# Patient Record
Sex: FEMALE | Race: WHITE | Employment: OTHER | URBAN - METROPOLITAN AREA
[De-identification: names, ages, dates, MRNs, and addresses within clinical notes are randomized per-mention and may not be internally consistent; named-entity substitution may affect disease eponyms.]

---

## 2018-01-01 ENCOUNTER — HOSPITAL ENCOUNTER (OUTPATIENT)
Age: 78
Setting detail: OUTPATIENT SURGERY
Discharge: HOME | End: 2018-10-17
Attending: INTERNAL MEDICINE | Admitting: INTERNAL MEDICINE
Payer: MEDICARE

## 2018-01-01 ENCOUNTER — OFFICE VISIT (OUTPATIENT)
Dept: HEMATOLOGY | Age: 78
End: 2018-01-01

## 2018-01-01 ENCOUNTER — APPOINTMENT (OUTPATIENT)
Dept: ULTRASOUND IMAGING | Age: 78
End: 2018-01-01
Attending: INTERNAL MEDICINE
Payer: MEDICARE

## 2018-01-01 ENCOUNTER — HOSPITAL ENCOUNTER (OUTPATIENT)
Dept: ULTRASOUND IMAGING | Age: 78
Discharge: HOME | End: 2018-10-18
Attending: INTERNAL MEDICINE
Payer: MEDICARE

## 2018-01-01 ENCOUNTER — HOSPITAL ENCOUNTER (OUTPATIENT)
Age: 78
Setting detail: OUTPATIENT SURGERY
Discharge: HOME | End: 2018-11-29
Attending: INTERNAL MEDICINE | Admitting: INTERNAL MEDICINE
Payer: MEDICARE

## 2018-01-01 ENCOUNTER — HOSPITAL ENCOUNTER (OUTPATIENT)
Dept: NON INVASIVE DIAGNOSTICS | Age: 78
Discharge: HOME | End: 2018-10-18
Attending: INTERNAL MEDICINE
Payer: MEDICARE

## 2018-01-01 VITALS
WEIGHT: 153 LBS | HEIGHT: 66 IN | BODY MASS INDEX: 24.59 KG/M2 | SYSTOLIC BLOOD PRESSURE: 130 MMHG | DIASTOLIC BLOOD PRESSURE: 65 MMHG

## 2018-01-01 VITALS
HEIGHT: 66 IN | HEART RATE: 70 BPM | DIASTOLIC BLOOD PRESSURE: 68 MMHG | OXYGEN SATURATION: 98 % | SYSTOLIC BLOOD PRESSURE: 144 MMHG | TEMPERATURE: 97 F | BODY MASS INDEX: 24.59 KG/M2 | WEIGHT: 153 LBS

## 2018-01-01 VITALS
HEART RATE: 71 BPM | TEMPERATURE: 97.2 F | DIASTOLIC BLOOD PRESSURE: 51 MMHG | SYSTOLIC BLOOD PRESSURE: 115 MMHG | BODY MASS INDEX: 24.59 KG/M2 | WEIGHT: 153 LBS | OXYGEN SATURATION: 97 % | HEIGHT: 66 IN

## 2018-01-01 VITALS
SYSTOLIC BLOOD PRESSURE: 124 MMHG | HEIGHT: 66 IN | TEMPERATURE: 97.6 F | WEIGHT: 153 LBS | RESPIRATION RATE: 16 BRPM | BODY MASS INDEX: 24.59 KG/M2 | DIASTOLIC BLOOD PRESSURE: 63 MMHG | OXYGEN SATURATION: 100 % | HEART RATE: 72 BPM

## 2018-01-01 VITALS
WEIGHT: 160.8 LBS | BODY MASS INDEX: 26.79 KG/M2 | HEART RATE: 79 BPM | HEIGHT: 65 IN | DIASTOLIC BLOOD PRESSURE: 85 MMHG | RESPIRATION RATE: 20 BRPM | TEMPERATURE: 98.7 F | OXYGEN SATURATION: 98 % | SYSTOLIC BLOOD PRESSURE: 152 MMHG

## 2018-01-01 DIAGNOSIS — R18.8 OTHER ASCITES: ICD-10-CM

## 2018-01-01 DIAGNOSIS — I25.10 CAD (CORONARY ARTERY DISEASE): ICD-10-CM

## 2018-01-01 DIAGNOSIS — R18.8 OTHER ASCITES: Primary | ICD-10-CM

## 2018-01-01 DIAGNOSIS — I35.0 AORTIC STENOSIS, SEVERE: ICD-10-CM

## 2018-01-01 DIAGNOSIS — Z11.59 ENCOUNTER FOR SCREENING FOR OTHER VIRAL DISEASES: ICD-10-CM

## 2018-01-01 DIAGNOSIS — I25.709 CORONARY ARTERY DISEASE INVOLVING CORONARY BYPASS GRAFT OF NATIVE HEART WITH ANGINA PECTORIS (HCC): Primary | ICD-10-CM

## 2018-01-01 DIAGNOSIS — R18.8 ASCITES: ICD-10-CM

## 2018-01-01 DIAGNOSIS — I25.709 CORONARY ARTERY DISEASE INVOLVING CORONARY BYPASS GRAFT OF NATIVE HEART WITH ANGINA PECTORIS (HCC): ICD-10-CM

## 2018-01-01 DIAGNOSIS — E87.79 OTHER FLUID OVERLOAD: ICD-10-CM

## 2018-01-01 LAB
ANION GAP SERPL CALC-SCNC: 7 MMOL/L (ref 3–18)
ATRIAL RATE: 69 BPM
BUN SERPL-MCNC: 35 MG/DL (ref 7–18)
BUN/CREAT SERPL: 30
CALCIUM SERPL-MCNC: 7.4 MG/DL (ref 8.5–10.1)
CALCULATED P AXIS, ECG09: 58 DEGREES
CALCULATED R AXIS, ECG10: 51 DEGREES
CALCULATED T AXIS, ECG11: 94 DEGREES
CHLORIDE SERPL-SCNC: 105 MMOL/L (ref 100–108)
CHOLEST SERPL-MCNC: 116 MG/DL
CO2 SERPL-SCNC: 27 MMOL/L (ref 21–32)
CRD SYSTOLIC BP: 149
CREAT SERPL-MCNC: 1.15 MG/DL (ref 0.6–1.3)
DIAGNOSIS, 93000: NORMAL
ECHO AO ARCH DIAM: 2.85 CM
ECHO AO ROOT DIAM: 3.13 CM
ECHO AV AREA PEAK VELOCITY: 1.1 CM2
ECHO AV AREA VTI: 1 CM2
ECHO AV AREA/BSA PEAK VELOCITY: 0.6 CM2/M2
ECHO AV AREA/BSA VTI: 0.5 CM2/M2
ECHO AV MEAN GRADIENT: 38.9 MMHG
ECHO AV PEAK GRADIENT: 65.7 MMHG
ECHO AV PEAK VELOCITY: 405.18 CM/S
ECHO AV REGURGITANT PHT: 319.9 CM
ECHO AV VTI: 103.94 CM
ECHO IVC PROX: 1.84 CM
ECHO LA MAJOR AXIS: 5.3 CM
ECHO LA VOL 2C: 128.6 ML (ref 22–52)
ECHO LA VOL 4C: 104.66 ML (ref 22–52)
ECHO LA VOL BP: 127.11 ML (ref 22–52)
ECHO LA VOL/BSA BIPLANE: 71.21 ML/M2 (ref 16–28)
ECHO LA VOLUME INDEX A2C: 72.05 ML/M2 (ref 16–28)
ECHO LA VOLUME INDEX A4C: 58.64 ML/M2 (ref 16–28)
ECHO LV E' LATERAL VELOCITY: 0.11 CM/S
ECHO LV E' SEPTAL VELOCITY: 0.08 CM/S
ECHO LV EDV A2C: 145.8 ML
ECHO LV EDV A4C: 158.5 ML
ECHO LV EDV BP: 152.5 ML (ref 56–104)
ECHO LV EDV INDEX A4C: 88.8 ML/M2
ECHO LV EDV INDEX BP: 85.4 ML/M2
ECHO LV EDV NDEX A2C: 81.7 ML/M2
ECHO LV EJECTION FRACTION A2C: 66 %
ECHO LV EJECTION FRACTION A4C: 63 %
ECHO LV EJECTION FRACTION BIPLANE: 64.3 % (ref 55–100)
ECHO LV ESV A2C: 50.3 ML
ECHO LV ESV A4C: 58.1 ML
ECHO LV ESV BP: 54.4 ML (ref 19–49)
ECHO LV ESV INDEX A2C: 28.2 ML/M2
ECHO LV ESV INDEX A4C: 32.6 ML/M2
ECHO LV ESV INDEX BP: 30.5 ML/M2
ECHO LV INTERNAL DIMENSION DIASTOLIC: 5.03 CM (ref 3.9–5.3)
ECHO LV INTERNAL DIMENSION SYSTOLIC: 3.42 CM
ECHO LV IVSD: 0.95 CM (ref 0.6–0.9)
ECHO LV MASS 2D: 206.1 G (ref 67–162)
ECHO LV MASS INDEX 2D: 115.5 G/M2 (ref 43–95)
ECHO LV POSTERIOR WALL DIASTOLIC: 0.99 CM (ref 0.6–0.9)
ECHO LVOT DIAM: 1.87 CM
ECHO LVOT PEAK GRADIENT: 9.9 MMHG
ECHO LVOT PEAK VELOCITY: 157.35 CM/S
ECHO LVOT VTI: 36.23 CM
ECHO MV A VELOCITY: 128.98 CM/S
ECHO MV AREA PHT: 2.7 CM2
ECHO MV E DECELERATION TIME (DT): 285.2 MS
ECHO MV E VELOCITY: 1.26 CM/S
ECHO MV E/A RATIO: 0.01
ECHO MV E/E' LATERAL: 11.45
ECHO MV E/E' RATIO (AVERAGED): 13.6
ECHO MV E/E' SEPTAL: 15.75
ECHO MV PRESSURE HALF TIME (PHT): 82.7 MS
ECHO PULMONARY ARTERY SYSTOLIC PRESSURE (PASP): 39 MMHG
ECHO PV MAX VELOCITY: 124.99 CM/S
ECHO PV PEAK GRADIENT: 6.2 MMHG
ECHO RA AREA 4C: 14.5 CM2
ECHO RV INTERNAL DIMENSION: 3.77 CM
ECHO RV TAPSE: 1.9 CM (ref 1.5–2)
ECHO RVOT PEAK VELOCITY: 90.49 CM/S
ECHO TV REGURGITANT MAX VELOCITY: 300.1 CM/S
ECHO TV REGURGITANT PEAK GRADIENT: 36 MMHG
END DIASTOLIC PRESSURE: 23
ERYTHROCYTE [DISTWIDTH] IN BLOOD BY AUTOMATED COUNT: 25.4 % (ref 11.6–14.5)
GLUCOSE BLD STRIP.AUTO-MCNC: 83 MG/DL (ref 70–110)
GLUCOSE SERPL-MCNC: 74 MG/DL (ref 74–99)
HCT VFR BLD AUTO: 30.7 % (ref 35–45)
HDLC SERPL-MCNC: 35 MG/DL (ref 40–60)
HDLC SERPL: 3.3 {RATIO} (ref 0–5)
HGB BLD-MCNC: 9.5 G/DL (ref 12–16)
INR PPP: 1.1 (ref 0.8–1.2)
LDLC SERPL CALC-MCNC: 67.2 MG/DL (ref 0–100)
LIPID PROFILE,FLP: ABNORMAL
MAGNESIUM SERPL-MCNC: 2.4 MG/DL (ref 1.6–2.6)
MCH RBC QN AUTO: 26.9 PG (ref 24–34)
MCHC RBC AUTO-ENTMCNC: 30.9 G/DL (ref 31–37)
MCV RBC AUTO: 87 FL (ref 74–97)
P-R INTERVAL, ECG05: 144 MS
PISA AR MAX VEL: 260.38 CM/S
PLATELET # BLD AUTO: 262 K/UL (ref 135–420)
POTASSIUM SERPL-SCNC: 4.9 MMOL/L (ref 3.5–5.5)
PROTHROMBIN TIME: 14 SEC (ref 11.5–15.2)
Q-T INTERVAL, ECG07: 424 MS
QRS DURATION, ECG06: 88 MS
QTC CALCULATION (BEZET), ECG08: 454 MS
RBC # BLD AUTO: 3.53 M/UL (ref 4.2–5.3)
SODIUM SERPL-SCNC: 139 MMOL/L (ref 136–145)
TRIGL SERPL-MCNC: 69 MG/DL (ref ?–150)
VENTRICULAR RATE, ECG03: 69 BPM
VLDLC SERPL CALC-MCNC: 13.8 MG/DL
WBC # BLD AUTO: 15.4 K/UL (ref 4.6–13.2)

## 2018-01-01 PROCEDURE — 99152 MOD SED SAME PHYS/QHP 5/>YRS: CPT | Performed by: INTERNAL MEDICINE

## 2018-01-01 PROCEDURE — C1894 INTRO/SHEATH, NON-LASER: HCPCS | Performed by: INTERNAL MEDICINE

## 2018-01-01 PROCEDURE — C1769 GUIDE WIRE: HCPCS | Performed by: INTERNAL MEDICINE

## 2018-01-01 PROCEDURE — 77030014137 HC TY PARCNT TELE -B: Performed by: INTERNAL MEDICINE

## 2018-01-01 PROCEDURE — 74011250636 HC RX REV CODE- 250/636

## 2018-01-01 PROCEDURE — 83735 ASSAY OF MAGNESIUM: CPT

## 2018-01-01 PROCEDURE — 77030008543 HC TBNG MON PRSS MRTM -A: Performed by: INTERNAL MEDICINE

## 2018-01-01 PROCEDURE — 74011636320 HC RX REV CODE- 636/320: Performed by: INTERNAL MEDICINE

## 2018-01-01 PROCEDURE — 93461 R&L HRT ART/VENTRICLE ANGIO: CPT | Performed by: INTERNAL MEDICINE

## 2018-01-01 PROCEDURE — 93976 VASCULAR STUDY: CPT

## 2018-01-01 PROCEDURE — 93005 ELECTROCARDIOGRAM TRACING: CPT

## 2018-01-01 PROCEDURE — 77030018836 HC SOL IRR NACL ICUM -A: Performed by: INTERNAL MEDICINE

## 2018-01-01 PROCEDURE — C1751 CATH, INF, PER/CENT/MIDLINE: HCPCS | Performed by: INTERNAL MEDICINE

## 2018-01-01 PROCEDURE — 74011250636 HC RX REV CODE- 250/636: Performed by: INTERNAL MEDICINE

## 2018-01-01 PROCEDURE — 77030004522 HC CATH ANGI DX EXPO BSC -A: Performed by: INTERNAL MEDICINE

## 2018-01-01 PROCEDURE — C1760 CLOSURE DEV, VASC: HCPCS | Performed by: INTERNAL MEDICINE

## 2018-01-01 PROCEDURE — 76705 ECHO EXAM OF ABDOMEN: CPT

## 2018-01-01 PROCEDURE — 93306 TTE W/DOPPLER COMPLETE: CPT

## 2018-01-01 PROCEDURE — 76040000019: Performed by: INTERNAL MEDICINE

## 2018-01-01 PROCEDURE — 99153 MOD SED SAME PHYS/QHP EA: CPT | Performed by: INTERNAL MEDICINE

## 2018-01-01 PROCEDURE — 82962 GLUCOSE BLOOD TEST: CPT

## 2018-01-01 PROCEDURE — 85610 PROTHROMBIN TIME: CPT

## 2018-01-01 PROCEDURE — 80048 BASIC METABOLIC PNL TOTAL CA: CPT

## 2018-01-01 PROCEDURE — 77030013797 HC KT TRNSDUC PRSSR EDWD -A: Performed by: INTERNAL MEDICINE

## 2018-01-01 PROCEDURE — 77030004521 HC CATH ANGI DX COOK -B: Performed by: INTERNAL MEDICINE

## 2018-01-01 PROCEDURE — 85027 COMPLETE CBC AUTOMATED: CPT

## 2018-01-01 PROCEDURE — C1887 CATHETER, GUIDING: HCPCS | Performed by: INTERNAL MEDICINE

## 2018-01-01 PROCEDURE — 80061 LIPID PANEL: CPT

## 2018-01-01 PROCEDURE — 74011250637 HC RX REV CODE- 250/637: Performed by: INTERNAL MEDICINE

## 2018-01-01 RX ORDER — SODIUM CHLORIDE 9 MG/ML
INJECTION, SOLUTION INTRAVENOUS
Status: COMPLETED | OUTPATIENT
Start: 2018-01-01 | End: 2018-01-01

## 2018-01-01 RX ORDER — SODIUM CHLORIDE 0.9 % (FLUSH) 0.9 %
5-10 SYRINGE (ML) INJECTION EVERY 8 HOURS
Status: DISCONTINUED | OUTPATIENT
Start: 2018-01-01 | End: 2018-01-01 | Stop reason: HOSPADM

## 2018-01-01 RX ORDER — SODIUM CHLORIDE 0.9 % (FLUSH) 0.9 %
5-10 SYRINGE (ML) INJECTION AS NEEDED
Status: CANCELLED | OUTPATIENT
Start: 2018-01-01

## 2018-01-01 RX ORDER — SODIUM CHLORIDE 9 MG/ML
25 INJECTION, SOLUTION INTRAVENOUS CONTINUOUS
Status: DISCONTINUED | OUTPATIENT
Start: 2018-01-01 | End: 2018-01-01 | Stop reason: HOSPADM

## 2018-01-01 RX ORDER — DIPHENOXYLATE HYDROCHLORIDE AND ATROPINE SULFATE 2.5; .025 MG/1; MG/1
TABLET ORAL
COMMUNITY
End: 2018-01-01

## 2018-01-01 RX ORDER — SODIUM CHLORIDE 0.9 % (FLUSH) 0.9 %
5-10 SYRINGE (ML) INJECTION AS NEEDED
Status: DISCONTINUED | OUTPATIENT
Start: 2018-01-01 | End: 2018-01-01 | Stop reason: HOSPADM

## 2018-01-01 RX ORDER — LIDOCAINE HYDROCHLORIDE 10 MG/ML
INJECTION INFILTRATION; PERINEURAL AS NEEDED
Status: DISCONTINUED | OUTPATIENT
Start: 2018-01-01 | End: 2018-01-01 | Stop reason: HOSPADM

## 2018-01-01 RX ORDER — SODIUM CHLORIDE 0.9 % (FLUSH) 0.9 %
5-10 SYRINGE (ML) INJECTION EVERY 8 HOURS
Status: CANCELLED | OUTPATIENT
Start: 2018-01-01

## 2018-01-01 RX ORDER — HEPARIN SODIUM 200 [USP'U]/100ML
INJECTION, SOLUTION INTRAVENOUS
Status: COMPLETED | OUTPATIENT
Start: 2018-01-01 | End: 2018-01-01

## 2018-01-01 RX ORDER — SPIRONOLACTONE 100 MG/1
TABLET, FILM COATED ORAL DAILY
COMMUNITY
End: 2019-01-01

## 2018-01-01 RX ORDER — HYDROXYUREA 500 MG/1
500 CAPSULE ORAL DAILY
COMMUNITY
End: 2019-01-01

## 2018-01-01 RX ORDER — GUAIFENESIN 100 MG/5ML
81 LIQUID (ML) ORAL ONCE
Status: COMPLETED | OUTPATIENT
Start: 2018-01-01 | End: 2018-01-01

## 2018-01-01 RX ORDER — FENTANYL CITRATE 50 UG/ML
INJECTION, SOLUTION INTRAMUSCULAR; INTRAVENOUS AS NEEDED
Status: DISCONTINUED | OUTPATIENT
Start: 2018-01-01 | End: 2018-01-01 | Stop reason: HOSPADM

## 2018-01-01 RX ORDER — ALBUMIN HUMAN 250 G/1000ML
25 SOLUTION INTRAVENOUS
Status: CANCELLED | OUTPATIENT
Start: 2018-01-01

## 2018-01-01 RX ORDER — HEPARIN SODIUM 1000 [USP'U]/ML
INJECTION, SOLUTION INTRAVENOUS; SUBCUTANEOUS AS NEEDED
Status: DISCONTINUED | OUTPATIENT
Start: 2018-01-01 | End: 2018-01-01 | Stop reason: HOSPADM

## 2018-01-01 RX ORDER — MIDAZOLAM HYDROCHLORIDE 1 MG/ML
INJECTION, SOLUTION INTRAMUSCULAR; INTRAVENOUS AS NEEDED
Status: DISCONTINUED | OUTPATIENT
Start: 2018-01-01 | End: 2018-01-01 | Stop reason: HOSPADM

## 2018-01-01 RX ORDER — LORAZEPAM 1 MG/1
.5-1 TABLET ORAL ONCE
Status: COMPLETED | OUTPATIENT
Start: 2018-01-01 | End: 2018-01-01

## 2018-01-01 RX ADMIN — ASPIRIN 81 MG 81 MG: 81 TABLET ORAL at 08:04

## 2018-01-01 RX ADMIN — SODIUM CHLORIDE 25 ML/HR: 900 INJECTION, SOLUTION INTRAVENOUS at 07:56

## 2018-01-01 RX ADMIN — LORAZEPAM 1 MG: 1 TABLET ORAL at 08:04

## 2018-03-30 ENCOUNTER — HOSPITAL ENCOUNTER (OUTPATIENT)
Dept: LAB | Age: 78
Discharge: HOME | End: 2018-03-30

## 2018-04-03 ENCOUNTER — HOSPITAL ENCOUNTER (OUTPATIENT)
Dept: LAB | Age: 78
Discharge: HOME | End: 2018-04-03
Payer: MEDICARE

## 2018-04-03 LAB
ALBUMIN SERPL-MCNC: 3 G/DL (ref 3.4–5)
ALBUMIN/GLOB SERPL: 0.8 {RATIO} (ref 0.8–1.7)
ALP SERPL-CCNC: 272 U/L (ref 45–117)
ALT SERPL-CCNC: 25 U/L (ref 13–56)
ANION GAP SERPL CALC-SCNC: 7 MMOL/L (ref 3–18)
APPEARANCE UR: CLEAR
AST SERPL-CCNC: 59 U/L (ref 15–37)
BACTERIA SPEC CULT: NORMAL
BACTERIA URNS QL MICRO: ABNORMAL /HPF
BILIRUB SERPL-MCNC: 0.8 MG/DL (ref 0.2–1)
BILIRUB UR QL: NEGATIVE
BUN SERPL-MCNC: 24 MG/DL (ref 7–18)
BUN/CREAT SERPL: 18 (ref 12–20)
CALCIUM SERPL-MCNC: 8.1 MG/DL (ref 8.5–10.1)
CHLORIDE SERPL-SCNC: 104 MMOL/L (ref 100–108)
CO2 SERPL-SCNC: 28 MMOL/L (ref 21–32)
COLOR UR: YELLOW
CREAT SERPL-MCNC: 1.3 MG/DL (ref 0.6–1.3)
EPITH CASTS URNS QL MICRO: ABNORMAL /LPF (ref 0–5)
GLOBULIN SER CALC-MCNC: 3.8 G/DL (ref 2–4)
GLUCOSE SERPL-MCNC: 82 MG/DL (ref 74–99)
GLUCOSE UR STRIP.AUTO-MCNC: NEGATIVE MG/DL
HGB UR QL STRIP: NEGATIVE
KETONES UR QL STRIP.AUTO: NEGATIVE MG/DL
LEUKOCYTE ESTERASE UR QL STRIP.AUTO: ABNORMAL
NITRITE UR QL STRIP.AUTO: POSITIVE
PH UR STRIP: 5 [PH] (ref 5–8)
POTASSIUM SERPL-SCNC: 5.8 MMOL/L (ref 3.5–5.5)
PROT SERPL-MCNC: 6.8 G/DL (ref 6.4–8.2)
PROT UR STRIP-MCNC: 300 MG/DL
RBC #/AREA URNS HPF: 0 /HPF (ref 0–5)
SERVICE CMNT-IMP: NORMAL
SODIUM SERPL-SCNC: 139 MMOL/L (ref 136–145)
SP GR UR REFRACTOMETRY: 1.02 (ref 1–1.03)
UROBILINOGEN UR QL STRIP.AUTO: 0.2 EU/DL (ref 0.2–1)
WBC #/AREA STL HPF: NORMAL /HPF (ref 0–4)
WBC URNS QL MICRO: ABNORMAL /HPF (ref 0–5)

## 2018-04-03 PROCEDURE — 36415 COLL VENOUS BLD VENIPUNCTURE: CPT | Performed by: INTERNAL MEDICINE

## 2018-04-03 PROCEDURE — 80053 COMPREHEN METABOLIC PANEL: CPT | Performed by: INTERNAL MEDICINE

## 2018-04-03 PROCEDURE — 87493 C DIFF AMPLIFIED PROBE: CPT | Performed by: INTERNAL MEDICINE

## 2018-04-03 PROCEDURE — 89055 LEUKOCYTE ASSESSMENT FECAL: CPT | Performed by: INTERNAL MEDICINE

## 2018-04-03 PROCEDURE — 81001 URINALYSIS AUTO W/SCOPE: CPT | Performed by: INTERNAL MEDICINE

## 2018-04-03 PROCEDURE — 87045 FECES CULTURE AEROBIC BACT: CPT | Performed by: INTERNAL MEDICINE

## 2018-04-05 LAB
BACTERIA SPEC CULT: NORMAL
SERVICE CMNT-IMP: NORMAL

## 2018-04-06 LAB
FAX TO INFO,FAXT: NORMAL
FAX TO NUMBER,FAXN: NORMAL

## 2018-04-24 ENCOUNTER — HOSPITAL ENCOUNTER (INPATIENT)
Age: 78
LOS: 2 days | Discharge: HOME | DRG: 841 | End: 2018-04-26
Attending: HOSPITALIST | Admitting: HOSPITALIST
Payer: MEDICARE

## 2018-04-24 ENCOUNTER — APPOINTMENT (OUTPATIENT)
Dept: CT IMAGING | Age: 78
DRG: 841 | End: 2018-04-24
Attending: HOSPITALIST
Payer: MEDICARE

## 2018-04-24 PROBLEM — R18.8 ASCITES: Status: ACTIVE | Noted: 2018-04-24

## 2018-04-24 PROBLEM — I35.0 AORTIC STENOSIS: Status: ACTIVE | Noted: 2018-04-24

## 2018-04-24 PROCEDURE — 74011636320 HC RX REV CODE- 636/320: Performed by: HOSPITALIST

## 2018-04-24 PROCEDURE — 74011250636 HC RX REV CODE- 250/636: Performed by: HOSPITALIST

## 2018-04-24 PROCEDURE — 65270000029 HC RM PRIVATE

## 2018-04-24 PROCEDURE — 74177 CT ABD & PELVIS W/CONTRAST: CPT

## 2018-04-24 RX ORDER — HEPARIN SODIUM 5000 [USP'U]/ML
5000 INJECTION, SOLUTION INTRAVENOUS; SUBCUTANEOUS EVERY 8 HOURS
Status: DISCONTINUED | OUTPATIENT
Start: 2018-04-24 | End: 2018-04-26 | Stop reason: HOSPADM

## 2018-04-24 RX ORDER — FUROSEMIDE 10 MG/ML
40 INJECTION INTRAMUSCULAR; INTRAVENOUS DAILY
Status: DISCONTINUED | OUTPATIENT
Start: 2018-04-24 | End: 2018-04-26

## 2018-04-24 RX ORDER — GUAIFENESIN 100 MG/5ML
81 LIQUID (ML) ORAL DAILY
Status: DISCONTINUED | OUTPATIENT
Start: 2018-04-25 | End: 2018-04-26 | Stop reason: HOSPADM

## 2018-04-24 RX ADMIN — IOPAMIDOL 100 ML: 612 INJECTION, SOLUTION INTRAVENOUS at 16:38

## 2018-04-24 RX ADMIN — FUROSEMIDE 40 MG: 10 INJECTION, SOLUTION INTRAMUSCULAR; INTRAVENOUS at 21:40

## 2018-04-24 RX ADMIN — HEPARIN SODIUM 5000 UNITS: 5000 INJECTION, SOLUTION INTRAVENOUS; SUBCUTANEOUS at 21:40

## 2018-04-24 NOTE — IP AVS SNAPSHOT
[address] [place] [address] 
[phone] Patient: Thais Alcaraz MRN: [id number] [facility] [place]:4/18/3210 A check gilberto indicates which time of day the medication should be taken. My Medications START taking these medications Instructions Each Dose to Equal  
 Morning Noon Evening Bedtime  
 furosemide 40 mg tablet Commonly known as:  LASIX Your last dose was: Your next dose is: Take 1 Tab by mouth daily. 40 mg  
    
   
   
   
  
 hydroxyurea 500 mg capsule Commonly known as:  HYDREA Your last dose was: Your next dose is: Take 1 Cap by mouth daily for 90 days. 500 mg CONTINUE taking these medications Instructions Each Dose to Equal  
 Morning Noon Evening Bedtime  
 aspirin 81 mg chewable tablet Your last dose was: Your next dose is: Take 81 mg by mouth daily. 81 mg Where to Get Your Medications Information on where to get these meds will be given to you by the nurse or doctor. ! Ask your nurse or doctor about these medications  
  furosemide 40 mg tablet  
 hydroxyurea 500 mg capsule

## 2018-04-24 NOTE — IP AVS SNAPSHOT
[address] [address] 
[phone] Patient: Amy Gallo MRN: [id number] PBL:4/85/8117 About your hospitalization You were admitted on:  April 24, 2018 You last received care in the:  [address] [place] [address] You were discharged on:  April 26, 2018 Why you were hospitalized Your primary diagnosis was:  Polycythemia Vera (Hcc) Your diagnoses also included:  Ascites, Aortic Stenosis, Coronary Artery Disease Involving Native Coronary Artery Follow-up Information Follow up With Details Comments Contact Info Low Leon MD On 5/1/2018 Follow up appointment scheduled for May 1, 2018 at 2:20 p.m. [facility] [address] [place] [address] 
[phone] Radha Paredes MD Schedule an appointment as soon as possible for a visit in 1 week  [address] 
KAREN [address] [facility] 
[phone] Kacy Perez MD On 5/7/2018 Follow up appointment scheduled for May 7, 2018 at 11:45 a.m. [address] [address] 
[phone] Discharge Orders None A check gilberto indicates which time of day the medication should be taken. My Medications START taking these medications Instructions Each Dose to Equal  
 Morning Noon Evening Bedtime  
 furosemide 40 mg tablet Commonly known as:  LASIX Your last dose was: Your next dose is: Take 1 Tab by mouth daily. 40 mg  
    
   
   
   
  
 hydroxyurea 500 mg capsule Commonly known as:  HYDREA Your last dose was: Your next dose is: Take 1 Cap by mouth daily for 90 days. 500 mg CONTINUE taking these medications Instructions Each Dose to Equal  
 Morning Noon Evening Bedtime  
 aspirin 81 mg chewable tablet Your last dose was: Your next dose is: Take 81 mg by mouth daily. 81 mg Where to Get Your Medications Information on where to get these meds will be given to you by the nurse or doctor. ! Ask your nurse or doctor about these medications  
  furosemide 40 mg tablet  
 hydroxyurea 500 mg capsule Discharge Instructions Ascites: Care Instructions Your Care Instructions Ascites (say \"ashok-Festus\") is a buildup of extra fluid in the belly. It can cause your belly to swell. It can also make it hard for you to breathe. Many diseases can cause ascites. But most people who get it have a liver problem. When the liver gets damaged, it can cause fluid to back up from the liver or from blood vessels. Then this fluid builds up in the belly. Your doctor may take a sample of the fluid in your belly with a thin needle. The sample is then tested to help find out the cause of the ascites. Treatment may include medicine. It may also include changing the way you eat so you don't eat a lot of salt. If your ascites is very bad and hard to treat, your doctor may need to use a needle to take out the fluid. Follow-up care is a key part of your treatment and safety. Be sure to make and go to all appointments, and call your doctor if you are having problems. It's also a good idea to know your test results and keep a list of the medicines you take. How can you care for yourself at home? · Be safe with medicines. Take your medicines exactly as prescribed. Call your doctor if you have any problems with your medicine. You will get more details on the specific medicines your doctor prescribes. · Eat low-salt foods, and don't add salt to your food. If you eat a lot of salt, it's harder to get rid of the extra fluid. Salt is in many prepared foods. These include cisneros, canned foods, snack foods, sauces, and soups. Look for products that are low-sodium or have reduced salt. · Do not drink any alcohol until you are all better. Alcohol will damage the liver more. If your liver disease is caused by drinking alcohol, do not drink alcohol at all. Tell your doctor if you need help to quit. Counseling, support groups, and sometimes medicines can help you stay sober. · Talk to your doctor before you take any other medicines. These include over-the-counter medicines, vitamins, and herbal products. · Make sure your doctor knows all the medicines you take. Some medicines, such as acetaminophen (Tylenol), can make liver problems worse. When should you call for help? Call 911 anytime you think you may need emergency care. For example, call if: 
? · You have trouble breathing. ? · You vomit blood or what looks like coffee grounds. ?Call your doctor now or seek immediate medical care if: 
? · You have new or worse belly pain. ? · You have a fever. ? Watch closely for changes in your health, and be sure to contact your doctor if: 
? · You have any problems. ? · Your belly is getting bigger. ? · You are gaining weight. Where can you learn more? Go to http://alex-harry.info/. Enter B970 in the search box to learn more about \"Ascites: Care Instructions. \" Current as of: May 12, 2017 Content Version: 11.4 © 2966-9467 Healthwise, Incorporated. Care instructions adapted under license by [employer] (which disclaims liability or warranty for this information). If you have questions about a medical condition or this instruction, always ask your healthcare professional. [place] [address] any warranty or liability for your use of this information. Introducing [facility]! [employer] introduces boarding pass patient portal. Now you can access parts of your medical record, email your doctor's office, and request medication refills online. 1. In your internet browser, go to [email or web address] 2. Click on the First Time User? Click Here link in the Sign In box. You will see the New Member Sign Up page. 3. Enter your Golf121 Access Code exactly as it appears below. You will not need to use this code after youve completed the sign-up process. If you do not sign up before the expiration date, you must request a new code. · [email or web address] Access Code: [email or web address] Expires: 6/28/2018  9:58 AM 
 
4. Enter the last four digits of your Social Security Number (xxxx) and Date of Birth (mm/dd/yyyy) as indicated and click Submit. You will be taken to the next sign-up page. 5. Create a Golf121 ID. This will be your Golf121 login ID and cannot be changed, so think of one that is secure and easy to remember. 6. Create a Golf121 password. You can change your password at any time. 7. Enter your Password Reset Question and Answer. This can be used at a later time if you forget your password. 8. Enter your e-mail address. You will receive e-mail notification when new information is available in [address] 9. Click Sign Up. You can now view and download portions of your medical record. 10. Click the Download Summary menu link to download a portable copy of your medical information. If you have questions, please visit the Frequently Asked Questions section of the Golf121 website. Remember, Golf121 is NOT to be used for urgent needs. For medical emergencies, dial 911. Now available from your iPhone and Android! Introducing Jerry Hernandez As a [facility] patient, I wanted to make you aware of our electronic visit tool called Jerry Hernandez. [facility] 24/7 allows you to connect within minutes with a medical provider 24 hours a day, seven days a week via a mobile device or tablet or logging into a secure website from your computer. You can access Jerry Hernandez from anywhere in the [place].  
 
A virtual visit might be right for you when you have a simple condition and feel like you just dont want to get out of bed, or cant get away from work for an appointment, when your regular [employer] provider is not available (evenings, weekends or holidays), or when youre out of town and need minor care. Electronic visits cost only $49 and if the [employer] 24/7 provider determines a prescription is needed to treat your condition, one can be electronically transmitted to a nearby pharmacy*. Please take a moment to enroll today if you have not already done so. The enrollment process is free and takes just a few minutes. To enroll, please download the [employer] 24/7 alfredito to your tablet or phone, or visit [email or web address] to enroll on your computer. And, as an [address] [place] [address] patient with a [employer] account, the results of your visits will be scanned into your electronic medical record and your primary care provider will be able to view the scanned results. We urge you to continue to see your regular [employer] provider for your ongoing medical care. And while your primary care provider may not be the one available when you seek a [facility] virtual visit, the peace of mind you get from getting a real diagnosis real time can be priceless. For more information on [employer], view our Frequently Asked Questions (FAQs) at [email or web address]. Sincerely, 
 
Isi Fink MD 
Chief Medical Officer Manda Yeh *:  certain medications cannot be prescribed via [employer] Providers Seen During Your Hospitalization Provider Specialty Primary office phone Callie Call MD [facility] [phone] Your Primary Care Physician (PCP) Primary Care Physician Office Phone Office Fax Ethel Ziegler [phone] You are allergic to the following No active allergies Recent Documentation Weight Breastfeeding? BMI OB Status Smoking Status 77.2 kg No 27.91 kg/m2 Postmenopausal Never Smoker Emergency Contacts Name Discharge Info Relation Home Work Mobile Mary Kate Aguialr CAREGIVER [3] Son [22] [phone] Patient Belongings The following personal items are in your possession at time of discharge: 
  Dental Appliances: None  Visual Aid: Glasses, With patient      Home Medications: None   Jewelry: None  Clothing: Pajamas, Pants, Shirt, Slippers, Socks, Sweater, Undergarments, With patient    Other Valuables: None  Personal Items Sent to Safe: no 
 
  
  
 Please provide this summary of care documentation to your next provider. Signatures-by signing, you are acknowledging that this After Visit Summary has been reviewed with you and you have received a copy. Patient Signature:  ____________________________________________________________ Date:  ____________________________________________________________  
  
[facility] Provider Signature:  ____________________________________________________________ Date:  ____________________________________________________________

## 2018-04-24 NOTE — PROGRESS NOTES
CONTRAST FOR GASTROGRAFFIN GIVEN. 15 ML WAS MIXED WITH 6 OZ OF WATER. SHE DRANK THIS AND CT NOTIFIED THAT PT HAD COMPLETED 1ST DOSE AND THEN I GAVE HER SECOND DOSE.  THEY STATED THEY WILL COME GET HER AT 4:30. PT INSTRUCTED.

## 2018-04-24 NOTE — PROGRESS NOTES
PT WAS RECEIVED AS DIRECT ADMIT FROM HER DR'S OFFICE FOR ASCITES, EDEMA AND POLYCYTHEMIA. DR YAP WAS NOTIFIED OF HER ARRIVAL. I INTRODUCED MYSELF. SHE PUT A GOWN ON AND I ORDERED HER A TRAY FOR LUNCH WITH DR YAP'S PERMISSION. PT IS SITTING ON SIDE OF BED. NO COMPLAINTS.

## 2018-04-24 NOTE — H&P
History & Physical    Patient: Felix Bond MRN: [id number]  CSN: [id number]    YOB: 1940  Age: 78 y.o. Sex: female      DOA: 4/24/2018  Primary Care Provider:  Jacek Mendes MD      Assessment/Plan     Patient Active Problem List   Diagnosis Code    Cellulitis L03.90    Polycythemia vera ([facility] [address] D45    Coronary artery disease involving native coronary artery I25.10    Left leg cellulitis L03. 116    Phlebitis and thrombophlebitis of femoral vein (deep) (superficial) (HCC) I80.10    Ascites R18.8    Aortic stenosis I35.0       Admit to medical floor    Ascites - unclear etiology. Discussed with Dr. Tito Watkins, will get CT abdomen pelvis. Concern for abdomen/pelvis malignancy. Paracentesis after CT abdomen/pelvis. She had echo done 12/2017 with normal LVF, EF of 60-65%. RV normal size and systolic function, mod-severe AS. Lasix dose given. Polycythemia vera - JAK2 positive. Followed by heme/onc as outpatient. Was on Osei. Previous therapeutic phlebotomies. Splenomegaly - chronic secondary to above. DVT prophylaxis with heparin    Estimated length of stay : 2-3 days    CC: ascites       HPI:     Felix Bond is a 78 y.o. female who has past history of CAD, polycythemia vera, is being directly admitted from Dr. Kerrie Bhatti office for worsening ascites and edema. Patient has history of polycythemia vera, JAK2 positive, she was on jakofi and stopped as it did not effect splenomegaly. She use to get therapeutic phlebotomies. She reports that over the past month she has been having abdominal distension and edema that is getting worse. She also reports that she is now also feeling some SOB. She denies any chest pain, fever/chills. She was seen at Dr. Kerrie Bhatti office and given worsening ascites and edema direct admission is requested.      Past Medical History:   Diagnosis Date    Aortic stenosis     Breast CA ([facility] [address]     CAD (coronary artery disease)     Cancer (HCC) breast, rt    Cellulitis of left leg 06/28/2016    pt denies    Ill-defined condition     polycyclemia    Thromboembolus ([facility] [address] 06/2016    DVT leg leg       Past Surgical History:   Procedure Laterality Date    BREAST SURGERY PROCEDURE UNLISTED  2005    part. rt.mastectomy    HX CORONARY ARTERY BYPASS GRAFT  1996       Family History   Problem Relation Age of Onset    Migraines Maternal Grandmother        Social History     Social History    Marital status:      Spouse name: N/A    Number of children: N/A    Years of education: N/A     Social History Main Topics    Smoking status: Never Smoker    Smokeless tobacco: Never Used    Alcohol use 0.5 oz/week     1 Glasses of wine per week    Drug use: No    Sexual activity: Not Currently     Other Topics Concern    Not on file     Social History Narrative       Prior to Admission medications    Medication Sig Start Date End Date Taking? Authorizing Provider   aspirin 81 mg chewable tablet Take 81 mg by mouth daily. Yes Phys Other, MD       No Known Allergies    Review of Systems  Gen: No fever, chills, malaise, weight loss/gain. Heent: No headache, rhinorrhea, epistaxis, ear pain, hearing loss, sinus pain, neck pain/stiffness, sore throat. Heart: No chest pain, palpitations, LANGLEY, pnd, or orthopnea. Resp: No cough, hemoptysis, wheezing and shortness of breath. GI: No nausea, vomiting, diarrhea, constipation, melena or hematochezia. : No urinary obstruction, dysuria or hematuria. Derm: No rash, new skin lesion or pruritis. Musc/skeletal: no bone or joint complains. Vasc: No cyanosis or claudication. Endo: No heat/cold intolerance, no polyuria,polydipsia or polyphagia. Neuro: No unilateral weakness, numbness, tingling. No seizures.    Heme: see above          Physical Exam:     Physical Exam:  Visit Vitals    /66 (BP 1 Location: Left arm, BP Patient Position: Sitting)    Pulse 81    Temp 98.2 °F (36.8 °C)    Resp 16    SpO2 94%           Temp (24hrs), Av.9 °F (36.6 °C), Min:97.5 °F (36.4 °C), Max:98.2 °F (36.8 °C)    701 -  1900  In: 240 [P.O.:240]  Out: 100 [Urine:100]        General:  Awake, cooperative, no distress. Head:  Normocephalic, without obvious abnormality, atraumatic. Eyes:  Conjunctivae/corneas clear, sclera anicteric, PERRL, EOMs intact. Nose: Nares normal. No drainage or sinus tenderness. Throat: Lips, mucosa, and tongue normal.    Neck: Supple, symmetrical, trachea midline, no adenopathy. Lungs:   Clear to auscultation bilaterally. Heart:  Regular rate and rhythm, S1, S2 normal, no murmur, click, rub or gallop. Abdomen: Soft, distended, non-tender. Bowel sounds normal. No masses,  No organomegaly. Extremities: Extremities normal, atraumatic, no cyanosis. 2+ edema. Capillary refill normal.   Pulses: 2+ and symmetric all extremities. Skin: Skin color pink, turgor normal. No rashes or lesions   Neurologic: CNII-XII intact. No focal motor or sensory deficit.        Labs Reviewed:    CMP: No results found for: NA, K, CL, CO2, AGAP, GLU, BUN, CREA, GFRAA, GFRNA, CA, MG, PHOS, ALB, TBIL, TP, ALB, GLOB, AGRAT, SGOT, ALT, GPT  CBC: No results found for: WBC, HGB, HGBEXT, HCT, HCTEXT, PLT, PLTEXT, HGBEXT, HCTEXT, PLTEXT  All Cardiac Markers in the last 24 hours: No results found for: CPK, CK, CKMMB, CKMB, RCK3, CKMBT, CKNDX, CKND1, ANNIE, TROPT, TROIQ, JAI, TROPT, TNIPOC, BNP, BNPP      Procedures/imaging: see electronic medical records for all procedures/Xrays and details which were not copied into this note but were reviewed prior to creation of Plan        CC: Yandel Christine MD

## 2018-04-25 LAB
ANION GAP SERPL CALC-SCNC: 11 MMOL/L (ref 3–18)
BUN SERPL-MCNC: 22 MG/DL (ref 7–18)
BUN/CREAT SERPL: 17 (ref 12–20)
CALCIUM SERPL-MCNC: 8.5 MG/DL (ref 8.5–10.1)
CHLORIDE SERPL-SCNC: 104 MMOL/L (ref 100–108)
CO2 SERPL-SCNC: 24 MMOL/L (ref 21–32)
CREAT SERPL-MCNC: 1.33 MG/DL (ref 0.6–1.3)
ERYTHROCYTE [DISTWIDTH] IN BLOOD BY AUTOMATED COUNT: 24.3 % (ref 11.6–14.5)
GLUCOSE SERPL-MCNC: 84 MG/DL (ref 74–99)
HCT VFR BLD AUTO: 46.5 % (ref 35–45)
HGB BLD-MCNC: 14.2 G/DL (ref 12–16)
MAGNESIUM SERPL-MCNC: 2 MG/DL (ref 1.6–2.6)
MCH RBC QN AUTO: 21.1 PG (ref 24–34)
MCHC RBC AUTO-ENTMCNC: 30.5 G/DL (ref 31–37)
MCV RBC AUTO: 69 FL (ref 74–97)
PHOSPHATE SERPL-MCNC: 4.3 MG/DL (ref 2.5–4.9)
PLATELET # BLD AUTO: 325 K/UL (ref 135–420)
POTASSIUM SERPL-SCNC: 5.1 MMOL/L (ref 3.5–5.5)
RBC # BLD AUTO: 6.74 M/UL (ref 4.2–5.3)
SODIUM SERPL-SCNC: 139 MMOL/L (ref 136–145)
WBC # BLD AUTO: 31.7 K/UL (ref 4.6–13.2)

## 2018-04-25 PROCEDURE — 74011250636 HC RX REV CODE- 250/636: Performed by: HOSPITALIST

## 2018-04-25 PROCEDURE — 74011250637 HC RX REV CODE- 250/637: Performed by: HOSPITALIST

## 2018-04-25 PROCEDURE — 83735 ASSAY OF MAGNESIUM: CPT | Performed by: HOSPITALIST

## 2018-04-25 PROCEDURE — 65270000029 HC RM PRIVATE

## 2018-04-25 PROCEDURE — 80048 BASIC METABOLIC PNL TOTAL CA: CPT | Performed by: HOSPITALIST

## 2018-04-25 PROCEDURE — 36415 COLL VENOUS BLD VENIPUNCTURE: CPT | Performed by: HOSPITALIST

## 2018-04-25 PROCEDURE — 74011250636 HC RX REV CODE- 250/636: Performed by: INTERNAL MEDICINE

## 2018-04-25 PROCEDURE — 74011250637 HC RX REV CODE- 250/637: Performed by: INTERNAL MEDICINE

## 2018-04-25 PROCEDURE — 84100 ASSAY OF PHOSPHORUS: CPT | Performed by: HOSPITALIST

## 2018-04-25 PROCEDURE — 85027 COMPLETE CBC AUTOMATED: CPT | Performed by: HOSPITALIST

## 2018-04-25 RX ORDER — DIPHENOXYLATE HYDROCHLORIDE AND ATROPINE SULFATE 2.5; .025 MG/1; MG/1
2 TABLET ORAL
Status: DISCONTINUED | OUTPATIENT
Start: 2018-04-25 | End: 2018-04-26 | Stop reason: HOSPADM

## 2018-04-25 RX ORDER — HYDROXYUREA 500 MG/1
500 CAPSULE ORAL DAILY
Status: DISCONTINUED | OUTPATIENT
Start: 2018-04-25 | End: 2018-04-26 | Stop reason: HOSPADM

## 2018-04-25 RX ADMIN — HYDROXYUREA 500 MG: 500 CAPSULE ORAL at 13:38

## 2018-04-25 RX ADMIN — HEPARIN SODIUM 5000 UNITS: 5000 INJECTION, SOLUTION INTRAVENOUS; SUBCUTANEOUS at 14:23

## 2018-04-25 RX ADMIN — HEPARIN SODIUM 5000 UNITS: 5000 INJECTION, SOLUTION INTRAVENOUS; SUBCUTANEOUS at 22:01

## 2018-04-25 RX ADMIN — FUROSEMIDE 40 MG: 10 INJECTION, SOLUTION INTRAMUSCULAR; INTRAVENOUS at 10:52

## 2018-04-25 RX ADMIN — ASPIRIN 81 MG 81 MG: 81 TABLET ORAL at 10:51

## 2018-04-25 RX ADMIN — DIPHENOXYLATE HYDROCHLORIDE AND ATROPINE SULFATE 2 TABLET: 2.5; .025 TABLET ORAL at 14:22

## 2018-04-25 NOTE — PROGRESS NOTES
Hospitalist Progress Note    Patient: Jeremy Crowder MRN: [id number]  CSN: [id number]    YOB: 1940  Age: 78 y.o. Sex: female    DOA: 4/24/2018 LOS:  LOS: 1 day          Chief Complaint: pvera        Assessment/Plan     Disposition :      Polycythemia Vera with massive uncomfortable splenomegaly. Diarrhea. Edema. CAD hx of CABG    -Continue hydroxyurea as rx'd by heme.  -Continue lasix. Follow volume status, creatinine, and electrolytes. '  -Rx Lomotil for her diarrhea. -ASA. -Dispo TBD. 77 y/o female admitted with worsening abdominal distention and edema. Followed for [place] by Dr. Sagar López. CT scan abdomen with enlarging hepatospelnomegaly. Oncology consulting. Subjective:        Review of systems:    Constitutional: denies fevers, chills, myalgias  Respiratory: denies SOB, cough  Cardiovascular: denies chest pain, palpitations  Gastrointestinal: denies nausea, vomiting, diarrhea      Vital signs/Intake and Output:  Visit Vitals    /75 (BP 1 Location: Left arm, BP Patient Position: At rest)    Pulse 87    Temp 97.5 °F (36.4 °C)    Resp 18    Wt 77.2 kg (170 lb 4.8 oz)    SpO2 93%    Breastfeeding No    BMI 27.91 kg/m2     Current Shift:  04/25 0701 - 04/25 1900  In: 300 [P.O.:300]  Out: -   Last three shifts:  04/23 1901 - 04/25 0700  In: 240 [P.O.:240]  Out: 1000 [Urine:1000]    Exam:    General: Well developed, alert, NAD, OX3  Head/Neck: mmm  CVS:s1s2   Lungs:Clearb/l  Abdomen: Less distended. Notable/palpable massive hepatosplenomegaly. Extremities: Improved edema. Labs: Results:       Chemistry Recent Labs      04/25/18   0613   GLU  84   NA  139   K  5.1   CL  104   CO2  24   BUN  22*   CREA  1.33*   CA  8.5   AGAP  11   BUCR  17      CBC w/Diff Recent Labs      04/25/18   0613   WBC  31.7*   RBC  6.74*   HGB  14.2   HCT  46.5*   PLT  325      Cardiac Enzymes No results for input(s): CPK, CKND1, ANNIE in the last 72 hours.     No lab exists for component: CKRMB, TROIP   Coagulation No results for input(s): PTP, INR, APTT in the last 72 hours. No lab exists for component: INREXT    Lipid Panel No results found for: CHOL, CHOLPOCT, CHOLX, CHLST, CHOLV, 880162, HDL, LDL, LDLC, DLDLP, 530220, VLDLC, VLDL, TGLX, TRIGL, TRIGP, TGLPOCT, CHHD, CHHDX   BNP No results for input(s): BNPP in the last 72 hours. Liver Enzymes No results for input(s): TP, ALB, TBIL, AP, SGOT, GPT in the last 72 hours.     No lab exists for component: DBIL   Thyroid Studies No results found for: T4, T3U, TSH, TSHEXT     Procedures/imaging: see electronic medical records for all procedures/Xrays and details which were not copied into this note but were reviewed prior to creation of Kyleigh Han MD

## 2018-04-25 NOTE — CONSULTS
[facility]  MR#: [id number]  : 1940  ACCOUNT #: [de-identified]   DATE OF SERVICE: 2018    REASON FOR CONSULTATION:  Polycythemia vera. 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 She was admitted to the hospital as a direct admit from Dr. Adrian Fairly office for peripheral edema and possible ascites. She had an echo done in 2017, which showed an ejection fraction of 60% to 65% with moderately severe aortic stenosis. Lasix was given. She is feeling a little better with the Lasix. She has chronic cellulitis and sores on her lower extremities. The only medication she takes on a regular basis is a baby aspirin per day. We were asked to evaluate her for her massive splenomegaly. She was diagnosed with polycythemia vera by Dr. Angela Mariscal.  She was diagnosed in 2016. He treated her with several phlebotomies and Jakafi. The Jakafi was self-stopped after 6 months because it did not shrink her spleen. It is not really clear why Iftikhar Coombe was the first line agent as she had never been on Hydrea and Iftikhar Coombe is FDA approved for polycythemia vera in patients who fail Hydrea. Interestingly enough too, her dose had not been modified during that 6 month period. She stated 10 mg twice a day and the dose was never increased. She was on it for about 6 months. It is not clear how much phlebotomy she had, but she does have a known history of iron deficiency anemia in part from the phlebotomy. In talking to the patient with her massive splenomegaly, it has been getting more tender and more painful over the last few months and it is bothersome and that it is causing her difficulty doing the things that she would like to do. She is followed by cardiology at [facility]. with moderate aortic stenosis.   She had a recent echocardiogram in approximately 2017, which showed normal ejection fraction. She has a history of a prior DVT involving the left leg in . This was treated with an approximate 6-month course of anticoagulation and then anticoagulation was stopped. We were asked to evaluate her for her polycythemia vera, abdominal distention and lower extremity distension. According to the patient, she has had progressive leg edema, especially her feet over the last days to weeks. She also felt that her abdominal girth was increasing. There was a concern in Dr. Vern Cushing office that some of this may represent ascites and she was admitted for some diuresis. She had a CT scan last night of the abdomen and pelvis. Unfortunately, I cannot see the images and there is no report and my [facility] password does not work in [employer]. PAST MEDICAL HISTORY:  Significant for chronic sores on her lower extremities. She has a history of polycythemia vera, currently off therapy, known coronary artery disease with previous 2 vessel coronary artery bypass surgery in . She currently has moderate aortic stenosis and is being managed by cardiology. Other problems include history of a DVT involving her left femoral vein, previously treated with 6 months of anticoagulation. She has some chronic pain from her massive splenomegaly. PAST SURGICAL HISTORY:  Includes her coronary artery bypass surgery. She has also had a bone marrow aspirate and biopsy in the past.  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 She gets yearly mammograms. She was never given tamoxifen or an aromatase inhibitor. It sounds like she was treated with a lumpectomy alone. There was no node sampling suggesting that she had DCIS followed by breast radiation therapy. FAMILY HISTORY:  Her maternal grandmother had migraines. Her mother  of stomach cancer. Her father  of complications of polycythemia vera.   One sister  of multiple sclerosis. She has 2 sons who are healthy, 1 son that has had some back surgery in the past.    SOCIAL HISTORY:  The patient is a . She is very active in the community. She denies any tobacco use, but did have a very remote history of tobacco, having smoking a few cigarettes in her lifetime. She drinks an occasional glass of wine. ALLERGIES:  She has no known drug allergies. REVIEW OF SYSTEMS:  Her flu shot was in 2017. She had a pneumococcal vaccine, as well as a booster in approximately 2012 and 2017. She has never had a Prevnar vaccine. She did have chickenpox. She has never had a shingles vaccine. She believes she is up to date in bone density. Her last colonoscopy was approximately 10 years ago. Her last mammogram in 2017. REVIEW OF SYSTEMS:  The patient denies any fever, chills or night sweats. She denies any headaches, double vision, blurred vision, sinus congestion, cough, sputum production, hemoptysis, angina, nausea, vomiting, diarrhea, melena, hematochezia, dysuria, urgency or frequency. She denies any bleeding, bruising, petechia or rashes. She does have some chronic skin lesions on her lower legs. She has been having progressive peripheral edema. She is having increasing abdominal distention. PHYSICAL EXAMINATION:  GENERAL:  Patient is a well-developed female in no acute distress. VITAL SIGNS:  Temperature 97.3, pulse 81, respirations 16, blood pressure 116/52, and O2 saturation 91% to 95%, weight 77.2 kilograms. Patient is a well-developed female. HEENT:  Head normocephalic, atraumatic. Eyes:  Pupils equal, round, reactive to light and accommodation. Extraocular muscles are intact. No jaundice was seen. There was no sinus tenderness or discharge. Oral and nasal mucosa within normal limits, no mucositis or thrush noted. NECK:  Supple. There is no adenopathy in the neck, axilla, or groin. The thyroid is not enlarged.   HEART:  Regular rate and rhythm with a loud aortic stenosis murmur, 3/6. LUNGS:  Clear to auscultation and percussion. BACK:  No CVA or spinal tenderness. ABDOMEN:  Soft with a huge spleen that crosses the midline and can be palpable all the way to the pelvis. I do not feel any hepatomegaly. I do not feel any ascites. EXTREMITIES:  Show 2+ pitting edema of both the legs and the feet. She does have some chronic sores on her lower extremities bilaterally, but worse on the left leg. Pulses are strong. SKIN:  Significant for the lack of bleeding, bruising, petechia or rashes. NEUROLOGIC:  Cranial nerves intact. Sensory, motor exam within normal limits. Deep tendon reflexes 1+. Cerebellar exam normal.  A right lumpectomy with radiation therapy changes are seen. Left breast is normal.    LABORATORY DATA:  WBC is 30.2, hemoglobin 12, platelets 777,075, MCV 64 consistent with her known iron deficiency. She does have a neutrophilia with 26,000 neutrophils. I wonder if the increased neutrophils may be causing her sores on her lower legs related to her uncontrolled polycythemia vera. Sodium 139, potassium 5.0, chloride 104, CO2 of 28, glucose 82, BUN 24, creatinine 1.3, bilirubin 0.8, total protein 6.8, albumin 3.0. SGPT 25, SGOT 59, alkaline phosphatase 272. Her last ferritin last November 2016 was 15. Her last bone density was in June 2016 showing a T-score of 1.0 in the vertebrae, a T-score of 2.0 in the left femoral neck and a T-score in the right femur of 2.4. This is all normal.        IMPRESSION:  1. Polycythemia vera, currently untreated. She has massive splenomegaly. We will begin hydroxyurea 500 mg a day and titrate to shrink her spleen for comfort. Agree with plans to continue aspirin 81 mg a day. 2.  Peripheral edema may all be related to aortic stenosis and some fluid overload. She has already been given Lasix with some relief of symptoms. At least by my exam, I am not sure I feel any ascites at all, it may all be her big spleen. We will await her CT scan. 3.  Chronic leg sores, could be from her high white count. I do not think she is infected at the current time, it will be interesting to follow. She may benefit from some steroid cream.  4.  Arteriosclerotic coronary artery disease, status post bypass surgery. 5.  Moderate aortic stenosis. 6.  History of breast cancer, sounds like ductal carcinoma in situ, currently in remission. PLAN:  1. We will begin hydroxyurea 500 mg a day, it will be titrated to try to shrink her spleen and make her more comfortable. 2.  I will review her CAT scan when available. 3.  Agree with plans for careful diuresis. Our service will follow with you.       MD ADAN Saucedo / Demarco Gross  D: 04/25/2018 06:12     T: 04/25/2018 11:14  JOB #: [id number]

## 2018-04-25 NOTE — PROGRESS NOTES
Bedside and Verbal shift change report given to Luis Armando Marks RN (oncoming nurse) by Fawad Ba RN (offgoing nurse). Report included the following information SBAR, Kardex, Procedure Summary, Intake/Output, Recent Results and Med Rec Status.

## 2018-04-25 NOTE — PROGRESS NOTES
PT WAS GIVEN HER AM MEDS. HER LASIX AND HER ASA. HYDREA, ORAL CHEMO HAS BEEN ORDERED AND DISCUSSED WITH ROSITA KHAN CNS. PT OFFERS NO COMPLAINTS.

## 2018-04-25 NOTE — CONSULTS
Patient seen and eval.  Full consult dictated  CT done yesterday - I cannot view the films - my password will not let me into the pacs system  No reading in the chart  Clinically massive splenomegaly due to p vera  Interesting that she has never been on hydrea. Dr. Jessica Sullivan started her on Jakafi - approved for second line therapy but he used it for first line. Was on it for 6 months and dose was never titrated Not sure shy that approach was done. Patient stopped the medication after 6 months due to the fact her spleen did not shrink. Will start hydrea. Should not take iron or any MVI with iron in it. Await scans - there may not be any ascites. It may be her spleen which is huge    Full consult dictated.  [id number]

## 2018-04-25 NOTE — PROGRESS NOTES
2130. Jett Opoka Jettgoldie Pike Pt called for RN to advise of abdominal discomfort. Pt abdomen is distended and will have paracentesis 04/25. Pt had Lasix ordered for this afternoon. Spoke to pt and per pt \"I haven't had any Lasix. \" Will administer Lasix.      2140. Jett Pike IV lasix given

## 2018-04-25 NOTE — PROGRESS NOTES
Pt was received at bedside this am.  She offers no complaints. Bed is low,  Side rails up x 2 and call light is in reach. She states that she is not having any pain. Pt is waiting on results of test done yesterday.

## 2018-04-25 NOTE — PROGRESS NOTES
Reason for Admission:    Per chart, pt is a 78 y.o. female who has past history of CAD, polycythemia vera, is being directly admitted from Dr. Maximiliano Thomas office for worsening ascites and edema. Patient has history of polycythemia vera, JAK2 positive, she was on jakofi and stopped as it did not effect splenomegaly. She use to get therapeutic phlebotomies. She reports that over the past month she has been having abdominal distension and edema that is getting worse. She also reports that she is now also feeling some SOB. She denies any chest pain, fever/chills. She was seen at Dr. Maximiliano Thomas office and given worsening ascites and edema direct admission is requested.                     RRAT Score:      12, Low               Plan for utilizing home health:     TBD                     Likelihood of Readmission:  TBD                         Transition of Care Plan:             TBD    Care Management Interventions  Transition of Care Consult (CM Consult): Discharge Planning

## 2018-04-25 NOTE — ADVANCED PRACTICE NURSE
Patient started on Hydrea 500mg PO daily per Dr. Remy Cleary today. Patient educated on side effects of treatment, patient verbalized understanding and written information was provided. Patient requested to be restarted on her outpatient prescription of Lomotil (2 tabs PO qid) per Dr. Maribell Thomas and requests to speak to the dietitian regarding recommendations for home for her diarrhea. She reported having 2-3 episodes this morning. Dr. Maribell Thomas made aware, orders received. Please use chemotherapy precautions to handle body fluids until discharge. Use Chemo gown, eye protection and double glove, for tasks when body fluids may splash, such as emptying containers. Cover toilet with a disposable pad when flushing. Dont use the spray faucet over the toilet. Please inform all staff who may be exposed such as PCTs, respiratory therapy, rehab services. Gaetano Petty 59, MSN, APRN, [facility]  Clinical Nurse Specialist, Surgical/Oncology  Office: [phone]

## 2018-04-26 VITALS
SYSTOLIC BLOOD PRESSURE: 149 MMHG | BODY MASS INDEX: 27.91 KG/M2 | WEIGHT: 170.3 LBS | HEART RATE: 84 BPM | RESPIRATION RATE: 16 BRPM | TEMPERATURE: 97.7 F | DIASTOLIC BLOOD PRESSURE: 69 MMHG | OXYGEN SATURATION: 92 %

## 2018-04-26 LAB
ANION GAP SERPL CALC-SCNC: 11 MMOL/L (ref 3–18)
BUN SERPL-MCNC: 24 MG/DL (ref 7–18)
BUN/CREAT SERPL: 17 (ref 12–20)
CALCIUM SERPL-MCNC: 8.4 MG/DL (ref 8.5–10.1)
CHLORIDE SERPL-SCNC: 103 MMOL/L (ref 100–108)
CO2 SERPL-SCNC: 24 MMOL/L (ref 21–32)
CREAT SERPL-MCNC: 1.38 MG/DL (ref 0.6–1.3)
ERYTHROCYTE [DISTWIDTH] IN BLOOD BY AUTOMATED COUNT: 24.2 % (ref 11.6–14.5)
GLUCOSE SERPL-MCNC: 89 MG/DL (ref 74–99)
HCT VFR BLD AUTO: 48 % (ref 35–45)
HGB BLD-MCNC: 14.7 G/DL (ref 12–16)
MCH RBC QN AUTO: 21 PG (ref 24–34)
MCHC RBC AUTO-ENTMCNC: 30.6 G/DL (ref 31–37)
MCV RBC AUTO: 68.5 FL (ref 74–97)
PLATELET # BLD AUTO: 303 K/UL (ref 135–420)
POTASSIUM SERPL-SCNC: 5.5 MMOL/L (ref 3.5–5.5)
RBC # BLD AUTO: 7.01 M/UL (ref 4.2–5.3)
SODIUM SERPL-SCNC: 138 MMOL/L (ref 136–145)
WBC # BLD AUTO: 28 K/UL (ref 4.6–13.2)

## 2018-04-26 PROCEDURE — 74011250637 HC RX REV CODE- 250/637: Performed by: HOSPITALIST

## 2018-04-26 PROCEDURE — 74011250636 HC RX REV CODE- 250/636: Performed by: INTERNAL MEDICINE

## 2018-04-26 PROCEDURE — 85027 COMPLETE CBC AUTOMATED: CPT | Performed by: HOSPITALIST

## 2018-04-26 PROCEDURE — 80048 BASIC METABOLIC PNL TOTAL CA: CPT | Performed by: HOSPITALIST

## 2018-04-26 PROCEDURE — 74011250636 HC RX REV CODE- 250/636: Performed by: HOSPITALIST

## 2018-04-26 PROCEDURE — 36415 COLL VENOUS BLD VENIPUNCTURE: CPT | Performed by: HOSPITALIST

## 2018-04-26 RX ORDER — FUROSEMIDE 10 MG/ML
40 INJECTION INTRAMUSCULAR; INTRAVENOUS DAILY
Status: DISCONTINUED | OUTPATIENT
Start: 2018-04-26 | End: 2018-04-26 | Stop reason: HOSPADM

## 2018-04-26 RX ORDER — FUROSEMIDE 40 MG/1
40 TABLET ORAL DAILY
Qty: 30 TAB | Refills: 1 | Status: SHIPPED | OUTPATIENT
Start: 2018-04-26 | End: 2019-01-01

## 2018-04-26 RX ORDER — HYDROXYUREA 500 MG/1
500 CAPSULE ORAL DAILY
Qty: 30 CAP | Refills: 2 | Status: SHIPPED | OUTPATIENT
Start: 2018-04-26 | End: 2018-07-25

## 2018-04-26 RX ADMIN — ASPIRIN 81 MG 81 MG: 81 TABLET ORAL at 09:15

## 2018-04-26 RX ADMIN — HEPARIN SODIUM 5000 UNITS: 5000 INJECTION, SOLUTION INTRAVENOUS; SUBCUTANEOUS at 06:36

## 2018-04-26 RX ADMIN — HYDROXYUREA 500 MG: 500 CAPSULE ORAL at 13:28

## 2018-04-26 NOTE — PROGRESS NOTES
Pt was received at bedside this am.  She is sitting up on side of bed this am.  She is in great spirits. She is waiting on breakfast.  Bed is low. Side rails up x 2 and call light in reach. Iv left ac patent. No pain at this time. Dr Rubi Christianson saw her early this am and gave these recommendations. 1. Continue hydrea and dose adjust to try to shrink spleen  2. 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 F/u Dr. Marleni Walsh and adjust dose of hydrea  5. Can try cholestyramine for diarrhea  6. Workup form my standpoint is complete. Will need regular f/u to adjust hydrea  7.  Leg edema is better

## 2018-04-26 NOTE — PROGRESS NOTES
Pt is being discharged home. Her iv was removed from her left ac tip intact. Discharge instructions discussed with pt.

## 2018-04-26 NOTE — PROGRESS NOTES
Pt is resting in bed. She offers no complaints. She states that she is ready to go home. She states the she is very happy with Dr Robson Farley and wants to continue seeing him.

## 2018-04-26 NOTE — PROGRESS NOTES
Phone: [phone]  Paging : [phone]      Hematology / Oncology Progress Note    Admit Date: 2018    Assessment:     Principal Problem:    Polycythemia vera ([address] (2016)    Active Problems:    Coronary artery disease involving native coronary artery (2016)      Ascites (2018)      Aortic stenosis (2018)        Plan:     1. Continue hydrea and dose adjust to try to shrink spleen  2. 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 F/u Dr. Jaleesa Mittal and adjust dose of hydrea  5. Can try cholestyramine for diarrhea  6. Workup form my standpoint is complete. Will need regular f/u to adjust hydrea  7. Leg edema is better    Subjective:     Feels better., SPleen is . IT will take time for this to get better. This is untreated disease with years of hyperproliferation. Can f/u with Dr. Jaleesa Mittal in office.      Objective:     Patient Vitals for the past 24 hrs:   BP Temp Pulse Resp SpO2   18 0338 138/76 97.4 °F (36.3 °C) 89 16 95 %   18 2315 123/65 97.8 °F (36.6 °C) 88 17 92 %   18 1941 123/70 97.7 °F (36.5 °C) 88 18 94 %   18 1456 123/73 97.6 °F (36.4 °C) 85 18 95 %   18 1107 124/79 97.4 °F (36.3 °C) 84 18 95 %   18 1051 121/75 - 87 - -   18 0759 121/75 97.5 °F (36.4 °C) 87 18 93 %         Intake/Output Summary (Last 24 hours) at 18 0445  Last data filed at 18 1732   Gross per 24 hour   Intake              780 ml   Output                0 ml   Net              780 ml       Temp (24hrs), Av.6 °F (36.4 °C), Min:97.4 °F (36.3 °C), Max:97.8 °F (36.6 °C)      Constitutional:     Fever N    Chills N    Weight Loss N    Malaise N    Fatigue N    Diaphoresis N    Weakness N     Skin:    Rash Y    Itching N     HENT:     Headache N    Hearing Loss N    Tinnitus N    Ear pain N    Nosebleeds N    Congestion N    Stridor  N    Sore Throat N     Eyes:   Blurred Vision N   Double vision N   Photophobia N Eye Pain N   Eye Discharge  N   Eye Redness N     Cardiovascular:   Chest Pain N   Palpitations N   Orthopnea N   Claudication N   PND N   Leg Swelling Y     Respiratory:    Cough N   Hemoptysis N   Sputum Production N   Shortness of Breath N   Wheezing N     Gastrointestinal:    Heartburn N   Nausea N   Abdominal Pain Y   Diarrhea Y   Constipation N   Blood in Stool N   Melena N      Genitourinary:   Dysuria N   Urgency N   Frequency N   Hematuria N   Flank Pain N     Musculoskeletal:   Myalgias N   Neck Pain N   Back Pain N   Joint Pain N   Falls N     Endo/Heme/Allergies:   Easy Bruise N   Easy Bleed N   Env. Allergies N   Polydipsia N     Neurological:   Dizziness N   Tingling N   Tremor N   Sensory Change N   Speech Change N   Focal Weakness N   Seizures N   LOC N      Psychiatric:  Depression NN   Hallucinations N   Nervous N   Anxious N   Insomnia N   Memory Loss N       Exam:  General:  alert, cooperative, no distress, appears stated age  [de-identified]: Head: Normocephalic, no lesions, without obvious abnormality.   Lymphatic:  no lymphadenopathy   Lungs: clear to auscultation bilaterally  Cardiovascular:  normal rate and regular rhythm, MURMER C/W aORTIC STENOSIS  Pulses: 2+ and symmetric  Abdomen: MASSIVE SPLEEN,ABDOM TENDER  Musculoskeletal: no joint tenderness, deformity or swelling  Extremities: peripheral pulses normal, no pedal edema, no clubbing or cyanosis  Skin:  Normal.  Neurologic: NON FOCAL      Recent Results (from the past 24 hour(s))   METABOLIC PANEL, BASIC    Collection Time: 04/25/18  6:13 AM   Result Value Ref Range    Sodium 139 136 - 145 mmol/L    Potassium 5.1 3.5 - 5.5 mmol/L    Chloride 104 100 - 108 mmol/L    CO2 24 21 - 32 mmol/L    Anion gap 11 3.0 - 18 mmol/L    Glucose 84 74 - 99 mg/dL    BUN 22 (H) 7.0 - 18 MG/DL    Creatinine 1.33 (H) 0.6 - 1.3 MG/DL    BUN/Creatinine ratio 17 12 - 20      GFR est AA 47 (L) >60 ml/min/1.73m2    GFR est non-AA 39 (L) >60 ml/min/1.73m2    Calcium 8.5 8.5 - 10.1 MG/DL   MAGNESIUM    Collection Time: 04/25/18  6:13 AM   Result Value Ref Range    Magnesium 2.0 1.6 - 2.6 mg/dL   PHOSPHORUS    Collection Time: 04/25/18  6:13 AM   Result Value Ref Range    Phosphorus 4.3 2.5 - 4.9 MG/DL   CBC W/O DIFF    Collection Time: 04/25/18  6:13 AM   Result Value Ref Range    WBC 31.7 (H) 4.6 - 13.2 K/uL    RBC 6.74 (H) 4.20 - 5.30 M/uL    HGB 14.2 12.0 - 16.0 g/dL    HCT 46.5 (H) 35.0 - 45.0 %    MCV 69.0 (L) 74.0 - 97.0 FL    MCH 21.1 (L) 24.0 - 34.0 PG    MCHC 30.5 (L) 31.0 - 37.0 g/dL    RDW 24.3 (H) 11.6 - 14.5 %    PLATELET 240 269 - 478 K/uL     Current Facility-Administered Medications   Medication Dose Route Frequency Provider Last Rate Last Dose    hydroxyurea (HYDREA) chemo cap 500 mg  500 mg Oral DAILY Usha Paz MD   500 mg at 04/25/18 1338    diphenoxylate-atropine (LOMOTIL) tablet 2 Tab  2 Tab Oral QID PRN Karli Ureña MD   2 Tab at 04/25/18 1422    aspirin chewable tablet 81 mg  81 mg Oral DAILY Callie Call MD   81 mg at 04/25/18 1051    furosemide (LASIX) injection 40 mg  40 mg IntraVENous DAILY Callie Call MD   40 mg at 04/25/18 1052    heparin (porcine) injection 5,000 Units  5,000 Units SubCUTAneous Shannan Guajardo MD   5,000 Units at 04/25/18 2201        Usha Paz MD

## 2018-04-26 NOTE — PROGRESS NOTES
Transition of Care ([facility]) Plan:  Pt is to be discharged today with physician follow up. Pt is independent and continues to be. Pt's son will transport pt home later today. Met with pt and no plan of care needs have been identified. [facility] Transportation:     How is patient being transported at discharge? Family     When? 4/26/18     Is transport scheduled? N/A    Follow-up appointment and transportation:     PCP/Specialist? See AVS     Time/Date? See AVS     Who is transporting to the follow-up appointment? Pt/family      Is transport for follow up appointment scheduled? N/A    Communication plan (with patient/family): Who is being called? Patient or Next of Kin? Responsible party? patient      What number(s) is to be used? See face sheet      What service provider is calling for [facility] services? N/A      When are they calling? N/A    Readmission Risk? (Green/Low; Yellow/Moderate; Red/High): Green/Low     Care Management Interventions  PCP Verified by CM: Yes  Mode of Transport at Discharge:  Other (see comment) (Family/Son)  Transition of Care Consult (CM Consult): Discharge Planning  Health Maintenance Reviewed: Yes  Current Support Network: Family Lives Nearby  Confirm Follow Up Transport: Family  Plan discussed with Pt/Family/Caregiver: Yes  Discharge Location  Discharge Placement: Home with family assistance

## 2018-04-26 NOTE — DISCHARGE INSTRUCTIONS
Ascites: Care Instructions  Your Care Instructions  Ascites (say \"uh-SY-teez\") is a buildup of extra fluid in the belly. It can cause your belly to swell. It can also make it hard for you to breathe. Many diseases can cause ascites. But most people who get it have a liver problem. When the liver gets damaged, it can cause fluid to back up from the liver or from blood vessels. Then this fluid builds up in the belly. Your doctor may take a sample of the fluid in your belly with a thin needle. The sample is then tested to help find out the cause of the ascites. Treatment may include medicine. It may also include changing the way you eat so you don't eat a lot of salt. If your ascites is very bad and hard to treat, your doctor may need to use a needle to take out the fluid. Follow-up care is a key part of your treatment and safety. Be sure to make and go to all appointments, and call your doctor if you are having problems. It's also a good idea to know your test results and keep a list of the medicines you take. How can you care for yourself at home? · Be safe with medicines. Take your medicines exactly as prescribed. Call your doctor if you have any problems with your medicine. You will get more details on the specific medicines your doctor prescribes. · Eat low-salt foods, and don't add salt to your food. If you eat a lot of salt, it's harder to get rid of the extra fluid. Salt is in many prepared foods. These include cisneros, canned foods, snack foods, sauces, and soups. Look for products that are low-sodium or have reduced salt. · Do not drink any alcohol until you are all better. Alcohol will damage the liver more. If your liver disease is caused by drinking alcohol, do not drink alcohol at all. Tell your doctor if you need help to quit. Counseling, support groups, and sometimes medicines can help you stay sober. · Talk to your doctor before you take any other medicines.  These include over-the-counter medicines, vitamins, and herbal products. · Make sure your doctor knows all the medicines you take. Some medicines, such as acetaminophen (Tylenol), can make liver problems worse. When should you call for help? Call 911 anytime you think you may need emergency care. For example, call if:  ? · You have trouble breathing. ? · You vomit blood or what looks like coffee grounds. ?Call your doctor now or seek immediate medical care if:  ? · You have new or worse belly pain. ? · You have a fever. ? Watch closely for changes in your health, and be sure to contact your doctor if:  ? · You have any problems. ? · Your belly is getting bigger. ? · You are gaining weight. Where can you learn more? Go to http://alex-harry.info/. Enter B970 in the search box to learn more about \"Ascites: Care Instructions. \"  Current as of: May 12, 2017  Content Version: 11.4  © 3653-5902 [employer]. Care instructions adapted under license by [employer] (which disclaims liability or warranty for this information). If you have questions about a medical condition or this instruction, always ask your healthcare professional. [address] any warranty or liability for your use of this information.

## 2018-04-27 NOTE — DISCHARGE SUMMARY
[address] [facility]  MR#: [id number]  : 1940  ACCOUNT #: [de-identified]   ADMIT DATE: 2018  DISCHARGE DATE: 2018    CONSULTANTS:  Debbie Ott MD, [facility]. DIAGNOSES AT DISCHARGE:    1.  Polycythemia vera. 2.  Large splenomegaly. 3.  Peripheral edema. HOSPITAL COURSE:  This is a 57-year-old lady who has a history of polycythemia vera and enlarged spleen. She had been having worsening abdominal distention and diarrhea for a number of days and came into the hospital feeling like she was extremely edematous, very uncomfortable with abdominal enlargement and distention. She has had multiple previous therapeutic phlebotomies. She does have a history of splenomegaly. CT scan showed extremely large splenomegaly causing distention of her abdomen, worsening ascites and edema. Thus, she was seen at Dr. Gilmore Shoulders office and, given all of that symptomatology, she was admitted to the hospital.  She was seen in consultation by Dr. Debbie Ott. She had previously had an echo in 2017 that showed an EF of 60-65% with moderately severe aortic stenosis. She follows with Dr. Richardson Later now, I believe, as an outpatient. She has a history of prior DVT involving the left leg in . She had a 6-month course of anticoagulation and then it was stopped. Her workup here has included CBCs which show a white count of 28, H and H is 14.7 and 48, platelet count is 876. Chemistry is fairly unremarkable. Creatinine is 1.38. GFR is 37. The CAT scan on , as noted, showed the hepatosplenomegaly including massive enlargement of the spleen, which progressed since prior study. There is splenic infarct, mild to moderate abdominal pelvic ascites, portal hypertension, diverticulosis, multilevel spondylosis. Today she is feeling better. She denies any uncontrolled abdominal pain. She denies any shortness of breath.   She feels better after diuresing with Lasix. No chest pain, no notable leg edema and she has moved her bowels. She has eaten. She has been up and back and forth to the bathroom on her own without any assistance. Oncology notes that it will take time for her spleen to feel better. Overall, this is years of proliferation per Dr. Orion Garsia notes and that she can follow up in the office. Continue on the Hydrea. Take p.r.n. medications or cholestyramine for diarrhea and diuretics as needed. So with that, she feels well enough to go home. Today, she is awake and alert, in good spirits, no acute distress. Abdomen distention is large, but nontender. She has normal bowel sounds. Her blood pressure is 138/75, pulse 91, temperature 97.7, respiratory rate 16, SpO2 95% on room air. She has a grade III systolic ejection murmur, but regular rate and rhythm cardiac-wise. Lungs are clear. Lower extremities with 1+ edema. PLAN:  Discharge home today on the medications as recommended by oncology, which include Hydrea 500 mg daily. We will give her Lasix 40 mg daily, aspirin 81 mg daily, she is already on. There is no evidence for hypokalemia. Her last potassium was 5.5. She might be able to go to every other day on the Lasix, after she follows up with her primary care doctor. DISCHARGE INSTRUCTIONS:  Follow up with Dr. Ruba Dawn hematology/oncology on 05/07 and Dr. Adele Velazquez on 05/01. Appointments have been made for her and she will be given the time and information. She is otherwise stable to leave the hospital.  Spent 35 minutes on discharge time. DISPOSITION:  Discharge home today.       Matthew Oden MD       RI/JOAQUIN  D: 04/26/2018 10:53     T: 04/27/2018 08:19  JOB #: [id number]  CC: Spring Roman MD  CC: Terry Nageotte MD

## 2018-05-09 ENCOUNTER — HOSPITAL ENCOUNTER (OUTPATIENT)
Dept: VASCULAR SURGERY | Age: 78
Discharge: HOME | End: 2018-05-09
Attending: INTERNAL MEDICINE
Payer: MEDICARE

## 2018-05-09 DIAGNOSIS — M79.89 LEG SWELLING: ICD-10-CM

## 2018-05-09 PROCEDURE — 93971 EXTREMITY STUDY: CPT

## 2018-05-09 NOTE — PROCEDURES
[facility]  *** FINAL REPORT ***    Name: Randi Martino  MRN: [id number]    Outpatient  : 1940  HIS Order #: [id number]  [address] [facility] Visit #: [id number]  Date: 09 May 2018    TYPE OF TEST: Peripheral Venous Testing    REASON FOR TEST  Limb swelling    Right Leg:-  Deep venous thrombosis:           No  Superficial venous thrombosis:    No  Deep venous insufficiency:        Not examined  Superficial venous insufficiency: Not examined      INTERPRETATION/FINDINGS  Duplex images were obtained using 2-D gray scale, color flow, and  spectral Doppler analysis. Right leg :  1. Deep vein(s) visualized include the common femoral, deep femoral,  proximal femoral, mid femoral, distal femoral, popliteal(above knee),  popliteal(fossa), popliteal(below knee), posterior tibial and peroneal   veins. 2. No evidence of deep venous thrombosis detected in the veins  visualized. 3. No evidence of deep vein thrombosis in the contralateral common  femoral vein. 4. Superficial vein(s) visualized include the great saphenous vein. 5. No evidence of superficial thrombosis detected. ADDITIONAL COMMENTS    I have personally reviewed the data relevant to the interpretation of  this  study. TECHNOLOGIST: Dylan Albright  Signed: 2018 02:34 PM    PHYSICIAN: Pallavi Cunningham.  Fredy Bray MD  Signed: 2018 02:44 PM

## 2018-07-24 NOTE — PROGRESS NOTES
Health Maintenance Summary     Topic Due On Due Status Completed On    Osteoporosis Screening  Completed Oct 27, 2010    Medicare Wellness Visit May 15, 2018 Overdue May 15, 2017    IMMUNIZATION - DTaP/Tdap/Td Oct 10, 2022 Not Due Oct 10, 2012    Immunization-Influenza Sep 1, 2018 Not Due Nov 28, 2017    Lung Cancer Screening Dec 1, 1995 Overdue       Completed Feb 14, 2017          Patient is due for topics as listed above, she wishes to discuss with provider .    Lung cancer screen      Unaddressed Risk Adjusted HCC Categories and Diagnoses  HCC 58 - Major Depressive, Bipolar and Paranoid Disorders   Unaddressed Dx:Major depressive disorder, recurrent episode, in partial remission (CMS/HCC)   - Vascular Disease   Unaddressed Dx:Claudication (CMS/HCC)         Pt is sleeping in bed. Side rails up x 2 bed is low.

## 2018-10-10 PROBLEM — R16.1 SPLENOMEGALY: Status: ACTIVE | Noted: 2018-01-01

## 2018-10-10 NOTE — MR AVS SNAPSHOT
[address] [address] 
[phone] Patient: Melanie Pires MRN: [id number] Visit Information Date & Time Provider Department Dept. Phone Encounter #  
 10/10/2018 11:15 AM MD Ruthann Sun [address] [id number] Follow-up Instructions Return in about 2 weeks (around 10/24/2018) for MLS. Upcoming Health Maintenance Date Due DTaP/Tdap/Td series (1 - Tdap) 6/18/1963 Shingrix Vaccine Age 50> (1 of 2) 6/18/1992 GLAUCOMA SCREENING Q2Y 6/18/2007 Pneumococcal 65+ High/Highest Risk (2 of 2 - PPSV23) 12/1/2013 MEDICARE YEARLY EXAM 3/20/2018 Influenza Age 5 to Adult 8/1/2018 Allergies as of 10/10/2018  Review Complete On: 10/10/2018 By: Vishal Almeida LPN No Known Allergies Current Immunizations  Reviewed on 7/31/2016 Name Date Influenza Vaccine 11/1/2013 Pneumococcal Vaccine (Unspecified Type) 12/1/2008 Zoster Vaccine, Live 10/1/2011 Not reviewed this visit You Were Diagnosed With   
  
 Codes Comments Other ascites    -  Primary ICD-10-CM: R18.8 ICD-9-CM: 789.59 Encounter for screening for other viral diseases     ICD-10-CM: Z11.59 
ICD-9-CM: V73.89 Other fluid overload     ICD-10-CM: E87.79 ICD-9-CM: 276.69 Vitals BP Pulse Temp Height(growth percentile) Weight(growth percentile) SpO2  
 115/51 71 97.2 °F (36.2 °C) (Tympanic) 5' 5.5\" (1.664 m) 153 lb (69.4 kg) 97% BMI OB Status Smoking Status 25.07 kg/m2 Postmenopausal Never Smoker BMI and BSA Data Body Mass Index Body Surface Area 25.07 kg/m 2 1.79 m 2 Your Updated Medication List  
  
   
This list is accurate as of 10/10/18 12:23 PM.  Always use your most recent med list.  
  
  
  
  
 aspirin 81 mg chewable tablet Take 81 mg by mouth daily. furosemide 40 mg tablet Commonly known as:  LASIX Take 1 Tab by mouth daily. hydroxyurea 500 mg capsule Commonly known as:  HYDREA Take 500 mg by mouth daily. spironolactone 100 mg tablet Commonly known as:  ALDACTONE Take  by mouth daily. Follow-up Instructions Return in about 2 weeks (around 10/24/2018) for MLS. To-Do List   
 10/10/2018 Lab:  CBC WITH AUTOMATED DIFF   
  
 10/10/2018 Lab:  HCV AB W/REFLEX VERIFICATION   
  
 10/10/2018 Lab:  HEP A AB, TOTAL   
  
 10/10/2018 Lab:  HEP B SURFACE AB   
  
 10/10/2018 Lab:  HEP B SURFACE AG   
  
 10/10/2018 Lab:  HEPATIC FUNCTION PANEL   
  
 10/10/2018 Lab:  HEPATITIS B CORE AB, TOTAL   
  
 10/10/2018 Lab:  METABOLIC PANEL, BASIC   
  
 10/10/2018 Lab:  PROTHROMBIN TIME + INR   
  
 10/10/2018 Imaging:  US ABD LTD Around 11/09/2018 ECHO:  2D ECHO COMPLETE ADULT (TTE) W OR WO CONTR   
  
 11/09/2018 Procedures:  ABDOMINAL PARACENTESIS INITIAL Introducing [facility]! [employer] introduces [employer]s patient portal. Now you can access parts of your medical record, email your doctor's office, and request medication refills online. 1. In your internet browser, go to [email or web address] 2. Click on the First Time User? Click Here link in the Sign In box. You will see the New Member Sign Up page. 3. Enter your Neocrafts Access Code exactly as it appears below. You will not need to use this code after youve completed the sign-up process. If you do not sign up before the expiration date, you must request a new code. · Neocrafts Access Code: [email or web address] Expires: 1/8/2019 12:22 PM 
 
4. Enter the last four digits of your Social Security Number (xxxx) and Date of Birth (mm/dd/yyyy) as indicated and click Submit. You will be taken to the next sign-up page. 5. Create a Neocrafts ID.  This will be your Ne[employer]s login ID and cannot be changed, so think of one that is secure and easy to remember. 6. Create a Brickfish password. You can change your password at any time. 7. Enter your Password Reset Question and Answer. This can be used at a later time if you forget your password. 8. Enter your e-mail address. You will receive e-mail notification when new information is available in [address] 9. Click Sign Up. You can now view and download portions of your medical record. 10. Click the Download Summary menu link to download a portable copy of your medical information. If you have questions, please visit the Frequently Asked Questions section of the Brickfish website. Remember, Brickfish is NOT to be used for urgent needs. For medical emergencies, dial 911. Now available from your iPhone and Android! Please provide this summary of care documentation to your next provider. Your primary care clinician is listed as Cyndie Isaac. If you have any questions after today's visit, please call [phone].

## 2018-10-10 NOTE — PROGRESS NOTES
[address] [facility] 137 [facility] [place] Cinthia Bergeron MD, Antonella Martin, FAASLD Nick Juarez, TARIQ Tabor, JOHNNY Cooper, [facility]   Elen Villavicencio, TARIQ Leal, TARIQ Brunson [facility] 136 
  at [place] [address] [address] [facility], [address] [address]  22. [phone] FAX: [address] [place] [address] Avenue 
  [facility] 
  [address], [address] 98 [facility], [address] 
  [phone] FAX: [phone] Patient Care Team: 
Rylee Call MD as PCP - General (Internal Medicine) Problem List  Date Reviewed: 10/10/2018 Codes Class Noted Splenomegaly ICD-10-CM: R16.1 ICD-9-CM: 789.2  10/10/2018 Ascites ICD-10-CM: R18.8 ICD-9-CM: 789.59  4/24/2018 Aortic stenosis ICD-10-CM: I35.0 ICD-9-CM: 424.1  4/24/2018 Phlebitis and thrombophlebitis of femoral vein (deep) (superficial) (HCC) ICD-10-CM: I80.10 ICD-9-CM: 451.11  7/31/2016 [facility] ICD-10-CM: N09 ICD-9-CM: 238.4  6/30/2016 Coronary artery disease involving native coronary artery ICD-10-CM: I25.10 ICD-9-CM: 414.01  6/30/2016 The clinicians listed above have asked me to see Serena Holder in consultation regarding ascites and whether or now she has cirrhosis. All medical records sent by the referring physicians were reviewed including imaging studies The patient is a 70 y.o.  female who developed abdominal distention in 2017. 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 She developed ascites and underwent paracentesis in 7/2018. Cytology of the ascites was negative. I do not see other analysis of the fluid such as cell count or albumin. Serologic evaluation for markers of chronic liver disease has either not been performed or the results are not available to me. CT scan of the liver was performed in 9/2018. The results of the imaging demonstrated hepatomegaly but a normal appearing liver. An assessment of liver fibrosis with biopsy or elastography has not been performed. The patient notes fatigue, swelling of the abdomen, The patient has not experienced pain in the right side over the liver, problems concentrating, swelling of the lower extremities, hematemesis, hematochezia. The patient has limitations in functional activities which can be attributed to the liver disease and to other medical problems that are not related to the liver disease. ASSESSMENT AND PLAN: 
Ascites The etiology for this is not clear at this time. She does not appear to have cirrhosis Need to exclude cardiac etiology with and ECHO. Will schedule for paracetesis so we can sample the ascites and send for cell count, and albumin to see if there is portal hypertension. May need to perform hepatic venous pressure measurements and transjugular liver biopsy. The need to assess liver fibrosis was discussed. Sheer wave elastography can assess liver fibrosis and determine if a patient has advanced fibrosis or cirrhosis without the need for liver biopsy. Sheer wave elastography is now available at [facility] [employer]. This will be scheduled. If elastrography suggests advanced fibrosis then a liver biopsy should be considered. She likely has intrahepatic hematopoesis from [address] and this is causing a post-sinusoidal portal hypertension picture. Alicia Bamberger is also a risk for hepatic vein thrombosis and this could cause hepatomegaly and ascites. Will obtain ultrasound with duplex of portal and hepatic veins.  
 
Will perform laboratory testing to monitor liver function and degree of liver injury. This included BMP, hepatic panel, CBC with platelet count, INR. Will perform serologic tests to screen for other causes of chronic liver disease. These will likely be negative Continue step 1 diuretics. Treatment of other medical problems in patients with chronic liver disease There are no contraindications for the patient to take any medications that are necessary for treatment of other medical issues. The patient does not consume alcohol daily. Normal doses of acetaminophen can be used for pain as needed. Normal doses of acetaminophen as recommended on the label are not hepatotoxic, even in patients with cirrhosis. Counseling for alcohol in patients with chronic liver disease The patient was counseled regarding alcohol consumption and the effect of alcohol on chronic liver disease. The patient does not consume any significant amount of alcohol. Vaccinations The need for vaccination against viral hepatitis A and B will be assessed with serologic and instituted as appropriate. Routine vaccinations against other bacterial and viral agents can be performed as indicated. Annual flu vaccination should be administered if indicated. ALLERGIES No Known Allergies MEDICATIONS Current Outpatient Medications Medication Sig  
 hydroxyurea (HYDREA) 500 mg capsule Take 500 mg by mouth daily.  spironolactone (ALDACTONE) 100 mg tablet Take  by mouth daily.  furosemide (LASIX) 40 mg tablet Take 1 Tab by mouth daily.  aspirin 81 mg chewable tablet Take 81 mg by mouth daily. No current facility-administered medications for this visit. SYSTEM REVIEW NOT RELATED TO LIVER DISEASE OR REVIEWED ABOVE: 
Constitution systems: Negative for fever, chills, weight gain, weight loss. Eyes: Negative for visual changes. ENT: Negative for sore throat, painful swallowing. Respiratory: Negative for cough, hemoptysis, SOB. Cardiology: Negative for chest pain, palpitations. GI:  Negative for constipation or diarrhea. : Negative for urinary frequency, dysuria, hematuria, nocturia. Skin: Negative for rash. Hematology: Negative for easy bruising, blood clots. Musculo-skelatal: Negative for back pain, muscle pain, weakness. Neurologic: Negative for headaches, dizziness, vertigo, memory problems not related to HE. Psychology: Negative for anxiety, depression. FAMILY HISTORY: 
The father  of PVera, CVA. The mother  of stomach cancer. There is no family history of liver disease. SOCIAL HISTORY: 
The patient is . The patient has 1 child. The patient has never used tobacco products. The patient has never consumed significant amounts of alcohol. PHYSICAL EXAMINATION: 
Visit Vitals /51 Pulse 71 Temp 97.2 °F (36.2 °C) (Tympanic) Ht 5' 5.5\" (1.664 m) Wt 153 lb (69.4 kg) SpO2 97% BMI 25.07 kg/m² General: No acute distress. Eyes: Sclera anicteric. ENT: No oral lesions. Thyroid normal. 
Nodes: No adenopathy. Skin: No spider angiomata. No jaundice. No palmar erythema. Respiratory: Lungs clear to auscultation. Cardiovascular: Regular heart rate. No murmurs. No JVD. Abdomen: Distended. Some ascites appears the be present. Hepatomegaly. Splenomegaly. Extremities: No edema. Muscle wasting. Neurologic: Alert and oriented. Cranial nerves grossly intact. No asterixis. LABORATORY STUDIES: 
Recent liver function panel, CBC with platelet count and BMP are not available. These studies will be performed. SEROLOGIES: 
Not available or performed. Testing was performed today. LIVER HISTOLOGY: 
Not available or performed ENDOSCOPIC PROCEDURES: 
Not available or performed RADIOLOGY: 
2018. CT scan abdomen with IV contrast.  Normal appearing but enlarged liver. No liver mass lesions. Splenomegaly. Moderate ascites.  
 
OTHER TESTING: 
 10/2018. Bone marrow biopsy. Consistent with PVera, myeloproliferative disorder, myelofibrosis. FOLLOW-UP: 
All of the issues listed above in the Assessment and Plan were discussed with the patient. All questions were answered. The patient expressed a clear understanding of the above. [address] [place] [address] in 4 weeks for elastography to review all data and determine the treatment plan. Roxy Hoff MD 
[address] [address], [address] 98 Vandana Gregorio, [address] 
[phone] [address] [place] [address]

## 2018-10-17 NOTE — H&P
[address], MD, Kitty, Carlitos Braulio Valladares, [place]       Tamia Garcia, JOHNNY Kumar, ACNP-BC   Gilberto Liao, TARIQ George [facility] 136    at [address], [address] [address]  22.    [phone]    FAX: [address] [place] [address]    at [address], [address]    [phone]    FAX: [phone]         PRE-PROCEDURE NOTE - PARACENTESIS    H and P from last office visit reviewed. Allergies reviewed. Out-patient medication list reviewed. Patient Active Problem List   Diagnosis Code    Polycythemia vera ([facility] D45    Coronary artery disease involving native coronary artery I25.10    Phlebitis and thrombophlebitis of femoral vein (deep) (superficial) (HCC) I80.10    Ascites R18.8    Aortic stenosis I35.0    Splenomegaly R16.1       No Known Allergies    No current facility-administered medications on file prior to encounter. Current Outpatient Medications on File Prior to Encounter   Medication Sig Dispense Refill    hydroxyurea (HYDREA) 500 mg capsule Take 500 mg by mouth daily.  spironolactone (ALDACTONE) 100 mg tablet Take  by mouth daily.  furosemide (LASIX) 40 mg tablet Take 1 Tab by mouth daily. 30 Tab 1    aspirin 81 mg chewable tablet Take 81 mg by mouth daily. For paracentesis for diagnosis and/or treatment of ascites. The risks of the procedure were discussed with the patient. This included bleeding, pain and puncture of internal organs. All questions were answered. The patient wishes to proceed with the procedure. EXAM:  VS: per nursing note  General: No acute distress. Eyes: Sclera anicteric. ENT: No oral lesions.   Thyroid normal.  Nodes: No adenopathy. Skin: No spider angiomata. No jaundice. No palmar erythema. Respiratory: Lungs clear to auscultation. Cardiovascular: Regular heart rate. No murmurs. No JVD. Abdomen: Distended with obvious ascites. Soft non-tender. Unable to assess internal organs. Extremities: No edema. No muscle wasting. No gross arthritic changes. Neurologic: Alert and oriented. Cranial nerves grossly intact. No asterixsis. LABS:  Lab Results   Component Value Date/Time    WBC 28.0 (H) 04/26/2018 04:04 AM    Hemoglobin, POC 15.6 12/06/2016 11:15 AM    HGB 14.7 04/26/2018 04:04 AM    Hematocrit, POC 46 12/06/2016 11:15 AM    HCT 48.0 (H) 04/26/2018 04:04 AM    PLATELET 214 89/25/7342 04:04 AM    MCV 68.5 (L) 04/26/2018 04:04 AM     Lab Results   Component Value Date/Time    INR 1.2 12/19/2016 07:45 AM    INR 1.2 07/21/2016 08:30 AM    Prothrombin time 15.1 12/19/2016 07:45 AM    Prothrombin time 14.3 07/21/2016 08:30 AM       ASSESSMENT AND PLAN:  Paracentesis under ultrasound guidance. Tatyana Hassan MD  [address] [place] [address] A, [address] [place] [address] 22.  [phone]  [facility]      ADDENDUM:  Ultrasound showed no significant ascites. Paracentesis cancelled. I have ordered full liver ultrasound with elastography. Fullow-up as scheudled in office.     Tatyana Hassan MD  [address]  [address], [address] [place] [address], [address]  [address] [place] [address]

## 2018-10-23 NOTE — PROGRESS NOTES
[address] [place] MD, Gaston Laws, [facility] Ellie Vauhgn, NP Chad Cosby, JOHNNY Presley, [facility]   Priscilla Mims, TARIQ Artis, TARIQ Brunson [facility] De Garcia 136 
  at [address], [address] Sid Cortez  22. 
  [phone] FAX: [address] [place] [address] Avenue 
  [facility] 
  [address], [address] [place], [address] 
  [phone] FAX: [phone] Patient Care Team: 
Isabel Sherwood MD as PCP - General (Internal Medicine) Cr Calero MD (Oncology) Traci Watters MD (Surgery) Problem List  Date Reviewed: 10/21/2018 Codes Class Noted Splenomegaly ICD-10-CM: R16.1 ICD-9-CM: 789.2  10/10/2018 Ascites ICD-10-CM: R18.8 ICD-9-CM: 789.59  4/24/2018 Aortic stenosis ICD-10-CM: I35.0 ICD-9-CM: 424.1  4/24/2018 Phlebitis and thrombophlebitis of femoral vein (deep) (superficial) (HCC) ICD-10-CM: I80.10 ICD-9-CM: 451.11  7/31/2016 [facility] ICD-10-CM: T23 ICD-9-CM: 238.4  6/30/2016 Coronary artery disease involving native coronary artery ICD-10-CM: I25.10 ICD-9-CM: 414.01  6/30/2016 Nickolas Baugh returns to the [place] [address] for management of ascites. The active problem list, all pertinent past medical history, medications, radiologic findings and laboratory findings related to the liver disorder were reviewed with the patient. The patient is a 70 y.o.  female who developed abdominal distention in 2017. 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 She developed ascites and underwent paracentesis in 7/2018.   Cytology of the ascites was negative. There was no other analysis of the fluid such as cell count or albumin. Ascites has resolved with diuretics. An attempt at paracentesis since the last office appointment demonstrated only minimal ascites. The patient notes fatigue, swelling of the abdomen, The patient has not experienced pain in the right side over the liver, problems concentrating, swelling of the lower extremities, hematemesis, hematochezia. The patient has limitations in functional activities which can be attributed to the liver disease and to other medical problems that are not related to the liver disease. ASSESSMENT AND PLAN: 
Ascites The etiology for this is not clear at this time. She does not appear to have cirrhosis ECHO demonstrated mild pulmonary HTN but normal RV and only mild TR. Elastography demonstrated elevated liver stiffness US demonstrated no hepatic vekin thrombosis She likely has intrahepatic hematopoesis from [address] and this is causing a post-sinusoidal portal hypertension picture. Continue step 1 diuretics. Will hold off an liver biopsy for now and until tight aortic stenosis has been addressed Aortic stenosis ECHO showed tight aortic stenosis. This is likely the etiology for mild pulmonary HTN She will need valvuloplasty Treatment of other medical problems in patients with chronic liver disease There are no contraindications for the patient to take any medications that are necessary for treatment of other medical issues. The patient does not consume alcohol daily. Normal doses of acetaminophen can be used for pain as needed. Normal doses of acetaminophen as recommended on the label are not hepatotoxic, even in patients with cirrhosis. Counseling for alcohol in patients with chronic liver disease The patient was counseled regarding alcohol consumption and the effect of alcohol on chronic liver disease. The patient does not consume any significant amount of alcohol. Vaccinations The need for vaccination against viral hepatitis A and B will be assessed with serologic and instituted as appropriate. Routine vaccinations against other bacterial and viral agents can be performed as indicated. Annual flu vaccination should be administered if indicated. ALLERGIES No Known Allergies MEDICATIONS Current Outpatient Medications Medication Sig  
 hydroxyurea (HYDREA) 500 mg capsule Take 500 mg by mouth daily.  spironolactone (ALDACTONE) 100 mg tablet Take  by mouth daily.  furosemide (LASIX) 40 mg tablet Take 1 Tab by mouth daily.  aspirin 81 mg chewable tablet Take 81 mg by mouth daily. No current facility-administered medications for this visit. SYSTEM REVIEW NOT RELATED TO LIVER DISEASE OR REVIEWED ABOVE: 
Constitution systems: Negative for fever, chills, weight gain, weight loss. Eyes: Negative for visual changes. ENT: Negative for sore throat, painful swallowing. Respiratory: Negative for cough, hemoptysis, SOB. Cardiology: Negative for chest pain, palpitations. GI:  Negative for constipation or diarrhea. : Negative for urinary frequency, dysuria, hematuria, nocturia. Skin: Negative for rash. Hematology: Negative for easy bruising, blood clots. Musculo-skelatal: Negative for back pain, muscle pain, weakness. Neurologic: Negative for headaches, dizziness, vertigo, memory problems not related to HE. Psychology: Negative for anxiety, depression. FAMILY HISTORY: 
The father  of PVera, CVA. The mother  of stomach cancer. There is no family history of liver disease. SOCIAL HISTORY: 
The patient is . The patient has 1 child. The patient has never used tobacco products. The patient has never consumed significant amounts of alcohol. PHYSICAL EXAMINATION: 
Visit Vitals /68 Pulse 70 Temp 97 °F (36.1 °C) (Tympanic) Ht 5' 6\" (1.676 m) Wt 153 lb (69.4 kg) SpO2 98% BMI 24.69 kg/m² General: No acute distress. Eyes: Sclera anicteric. ENT: No oral lesions. Thyroid normal. 
Nodes: No adenopathy. Skin: No spider angiomata. No jaundice. No palmar erythema. Respiratory: Lungs clear to auscultation. Cardiovascular: Regular heart rate. No murmurs. No JVD. Abdomen: Distended. Some ascites appears the be present. Hepatomegaly. Splenomegaly. Extremities: No edema. Muscle wasting. Neurologic: Alert and oriented. Cranial nerves grossly intact. No asterixis. LABORATORY STUDIES: 
Recent liver function panel, CBC with platelet count and BMP are not available. These studies will be performed. SEROLOGIES: 
Not available or performed. Testing was performed today. LIVER HISTOLOGY: 
Not available or performed ENDOSCOPIC PROCEDURES: 
Not available or performed RADIOLOGY: 
9/2018. CT scan abdomen with IV contrast.  Normal appearing but enlarged liver. No liver mass lesions. Splenomegaly. Moderate ascites. OTHER TESTING: 
10/2018. Bone marrow biopsy. Consistent with PVera, myeloproliferative disorder, myelofibrosis. FOLLOW-UP: 
All of the issues listed above in the Assessment and Plan were discussed with the patient. All questions were answered. The patient expressed a clear understanding of the above. [address] [place] [address] in 4 weeks for elastography to review all data and determine the treatment plan. Pascual Estrada MD 
[id number] [address], [address] Vandana Gregorio, [address] 
[phone] [address] [place] [address]

## 2018-11-29 NOTE — Clinical Note
Patient transported with a Registered Nurse and [address] [place] [address] / [facility].

## 2018-11-29 NOTE — Clinical Note
TRANSFER - OUT REPORT:  
 
Verbal report given to: heaven. Report consisted of patient's Situation, Background, Assessment and  
Recommendations(SBAR). Opportunity for questions and clarification was provided. Patient transported with a Registered Nurse and [address] [place] [address] / [facility].

## 2018-11-29 NOTE — PROGRESS NOTES
Cardiology Progress Note Patient: Dalia Chase        Sex: female          DOA: 11/29/2018 YOB: 1940      Age:  70 y.o.        LOS:  LOS: 0 days Assessment/Plan Date of Surgery Update: 
Dalia Chase was seen and examined. History and physical has been reviewed. The patient has been examined. There have been no significant clinical changes since the completion of the originally dated History and Physical. 
 
Signed By: Gaston Celis MD   
 November 29, 2018 9:30 AM   
  
 
 
 
Signed By: Gaston Celis MD   
 November 29, 2018

## 2018-11-29 NOTE — DISCHARGE INSTRUCTIONS
DISCHARGE SUMMARY from Nurse    PATIENT INSTRUCTIONS:    After general anesthesia or intravenous sedation, for 24 hours or while taking prescription Narcotics:  · Limit your activities  · Do not drive and operate hazardous machinery  · Do not make important personal or business decisions  · Do  not drink alcoholic beverages  · If you have not urinated within 8 hours after discharge, please contact your surgeon on call. Report the following to your surgeon:  · Excessive pain, swelling, redness or odor of or around the surgical area  · Temperature over 100.5  · Nausea and vomiting lasting longer than 4 hours or if unable to take medications  · Any signs of decreased circulation or nerve impairment to extremity: change in color, persistent  numbness, tingling, coldness or increase pain  · Any questions    What to do at Home:  Recommended activity: Activity as tolerated,   . *  Please give a list of your current medications to your Primary Care Provider. *  Please update this list whenever your medications are discontinued, doses are      changed, or new medications (including over-the-counter products) are added. *  Please carry medication information at all times in case of emergency situations. These are general instructions for a healthy lifestyle:    No smoking/ No tobacco products/ Avoid exposure to second hand smoke  Surgeon General's Warning:  Quitting smoking now greatly reduces serious risk to your health. Obesity, smoking, and sedentary lifestyle greatly increases your risk for illness    A healthy diet, regular physical exercise & weight monitoring are important for maintaining a healthy lifestyle    You may be retaining fluid if you have a history of heart failure or if you experience any of the following symptoms:  Weight gain of 3 pounds or more overnight or 5 pounds in a week, increased swelling in our hands or feet or shortness of breath while lying flat in bed.   Please call your doctor as soon as you notice any of these symptoms; do not wait until your next office visit. Recognize signs and symptoms of STROKE:    F-face looks uneven    A-arms unable to move or move unevenly    S-speech slurred or non-existent    T-time-call 911 as soon as signs and symptoms begin-DO NOT go       Back to bed or wait to see if you get better-TIME IS BRAIN. Warning Signs of HEART ATTACK     Call 911 if you have these symptoms:   Chest discomfort. Most heart attacks involve discomfort in the center of the chest that lasts more than a few minutes, or that goes away and comes back. It can feel like uncomfortable pressure, squeezing, fullness, or pain.  Discomfort in other areas of the upper body. Symptoms can include pain or discomfort in one or both arms, the back, neck, jaw, or stomach.  Shortness of breath with or without chest discomfort.  Other signs may include breaking out in a cold sweat, nausea, or lightheadedness. Don't wait more than five minutes to call 911 - MINUTES MATTER! Fast action can save your life. Calling 911 is almost always the fastest way to get lifesaving treatment. Emergency Medical Services staff can begin treatment when they arrive -- up to an hour sooner than if someone gets to the hospital by car. The discharge information has been reviewed with the patient and caregiver. The patient and caregiver verbalized understanding. Discharge medications reviewed with the patient and caregiver and appropriate educational materials and side effects teaching were provided. ___________________________________________________________________________________________________________________________________                 Cardiac Catheterization/Angiography Discharge Instructions    *Check the puncture site frequently for swelling or bleeding. If you see any bleeding, lie down and apply pressure over the area with a clean town or washcloth.  Notify your doctor for any redness, swelling, drainage or oozing from the puncture site. Notify your doctor for any fever or chills. *If the leg or arm with the puncture becomes cold, numb or painful, call Dr Kya Cantu     *Activity should be limited for the next 48 hours. Climb stairs as little as possible and avoid any stooping, bending or strenuous activity for 48 hours. No heavy lifting (anything over 10 pounds) for three days. *Do not drive for 48 hours. *You may resume your usual diet. Drink more fluids than usual.    *Have a responsible person drive you home and stay with you for at least 24 hours after your heart catheterization/angiography. *You may remove the bandage from your Right and Groin in 24 hours. You may shower in 24 hours. No tub baths, hot tubs or swimming for one week. Do not place any lotions, creams, powders, ointments over the puncture site for one week. You may place a clean band-aid over the puncture site each day for 5 days. Change this daily.     Patient armband removed and shredded

## 2018-11-29 NOTE — PROGRESS NOTES
1045 Pt back to care unit. Procedure tolerated well. Tegaderm dressing with venous sheath in place to rt groin. Site WNL. Pt educated about laying flat 
 
1210 Rt groin venous sheath pulled and tolerated well. anothe Tegaderm dressing applied and site WNL. Po tolerated well 
 
1400 Pt had an uneventful recovery. Discharge instructions reviewed with pt and she verbalized all understandings. Pt escorted to car via W/C and left with son in stable condition with no complaints.

## 2018-11-29 NOTE — Clinical Note
Contrast Dose Calculator:  
Patient's age: 70.  
Patient's sex: Female. Patient weight (kg) = 72.9. Creatinine level (mg/dL) = 1.15. Creatinine clearance (mL/min): 48.  
Max Contrast dose per Creatinine Cl calculator = 108 mL.

## 2019-01-01 ENCOUNTER — APPOINTMENT (OUTPATIENT)
Dept: ULTRASOUND IMAGING | Age: 79
DRG: 433 | End: 2019-01-01
Attending: INTERNAL MEDICINE
Payer: MEDICARE

## 2019-01-01 ENCOUNTER — APPOINTMENT (OUTPATIENT)
Dept: VASCULAR SURGERY | Age: 79
End: 2019-01-01
Attending: EMERGENCY MEDICINE
Payer: MEDICARE

## 2019-01-01 ENCOUNTER — ANESTHESIA EVENT (OUTPATIENT)
Dept: ENDOSCOPY | Age: 79
DRG: 394 | End: 2019-01-01
Payer: MEDICARE

## 2019-01-01 ENCOUNTER — ANESTHESIA (OUTPATIENT)
Dept: ENDOSCOPY | Age: 79
DRG: 394 | End: 2019-01-01
Payer: MEDICARE

## 2019-01-01 ENCOUNTER — HOSPITAL ENCOUNTER (OUTPATIENT)
Dept: ULTRASOUND IMAGING | Age: 79
Discharge: HOME | DRG: 406 | End: 2019-08-27
Attending: RADIOLOGY | Admitting: RADIOLOGY
Payer: MEDICARE

## 2019-01-01 ENCOUNTER — TELEPHONE (OUTPATIENT)
Dept: HEMATOLOGY | Age: 79
End: 2019-01-01

## 2019-01-01 ENCOUNTER — ANESTHESIA EVENT (OUTPATIENT)
Dept: INTERVENTIONAL RADIOLOGY/VASCULAR | Age: 79
DRG: 406 | End: 2019-01-01
Payer: MEDICARE

## 2019-01-01 ENCOUNTER — HOSPITAL ENCOUNTER (EMERGENCY)
Age: 79
Discharge: HOME | End: 2019-02-13
Attending: EMERGENCY MEDICINE
Payer: MEDICARE

## 2019-01-01 ENCOUNTER — HOSPITAL ENCOUNTER (OUTPATIENT)
Dept: INTERVENTIONAL RADIOLOGY/VASCULAR | Age: 79
Discharge: HOME | End: 2019-03-22
Attending: INTERNAL MEDICINE
Payer: MEDICARE

## 2019-01-01 ENCOUNTER — APPOINTMENT (OUTPATIENT)
Dept: GENERAL RADIOLOGY | Age: 79
DRG: 406 | End: 2019-01-01
Attending: INTERNAL MEDICINE
Payer: MEDICARE

## 2019-01-01 ENCOUNTER — OFFICE VISIT (OUTPATIENT)
Dept: HEMATOLOGY | Age: 79
End: 2019-01-01

## 2019-01-01 ENCOUNTER — HOSPITAL ENCOUNTER (OUTPATIENT)
Dept: INTERVENTIONAL RADIOLOGY/VASCULAR | Age: 79
Discharge: HOME | End: 2019-08-20
Attending: INTERNAL MEDICINE | Admitting: INTERNAL MEDICINE

## 2019-01-01 ENCOUNTER — HOME CARE VISIT (OUTPATIENT)
Dept: HOSPICE | Facility: HOSPICE | Age: 79
End: 2019-01-01
Payer: MEDICARE

## 2019-01-01 ENCOUNTER — HOSPITAL ENCOUNTER (INPATIENT)
Dept: INTERVENTIONAL RADIOLOGY/VASCULAR | Age: 79
LOS: 2 days | Discharge: HOME | DRG: 406 | End: 2019-08-31
Attending: RADIOLOGY | Admitting: HOSPITALIST
Payer: MEDICARE

## 2019-01-01 ENCOUNTER — HOSPITAL ENCOUNTER (OUTPATIENT)
Dept: ULTRASOUND IMAGING | Age: 79
DRG: 406 | End: 2019-01-01
Attending: RADIOLOGY
Payer: MEDICARE

## 2019-01-01 ENCOUNTER — APPOINTMENT (OUTPATIENT)
Dept: ULTRASOUND IMAGING | Age: 79
DRG: 433 | End: 2019-01-01
Attending: NURSE PRACTITIONER
Payer: MEDICARE

## 2019-01-01 ENCOUNTER — HOSPITAL ENCOUNTER (EMERGENCY)
Age: 79
Discharge: HOME | End: 2019-07-24
Attending: EMERGENCY MEDICINE | Admitting: EMERGENCY MEDICINE
Payer: MEDICARE

## 2019-01-01 ENCOUNTER — HOSPITAL ENCOUNTER (INPATIENT)
Age: 79
LOS: 2 days | Discharge: HOME | DRG: 433 | End: 2019-03-19
Attending: EMERGENCY MEDICINE | Admitting: HOSPITALIST
Payer: MEDICARE

## 2019-01-01 ENCOUNTER — APPOINTMENT (OUTPATIENT)
Dept: CT IMAGING | Age: 79
DRG: 194 | End: 2019-01-01
Attending: HOSPITALIST
Payer: MEDICARE

## 2019-01-01 ENCOUNTER — OFFICE VISIT (OUTPATIENT)
Dept: ONCOLOGY | Age: 79
End: 2019-01-01

## 2019-01-01 ENCOUNTER — APPOINTMENT (OUTPATIENT)
Dept: NON INVASIVE DIAGNOSTICS | Age: 79
DRG: 194 | End: 2019-01-01
Attending: HOSPITALIST
Payer: MEDICARE

## 2019-01-01 ENCOUNTER — HOSPITAL ENCOUNTER (OUTPATIENT)
Dept: ULTRASOUND IMAGING | Age: 79
Discharge: HOME | End: 2019-04-25
Attending: RADIOLOGY | Admitting: RADIOLOGY
Payer: MEDICARE

## 2019-01-01 ENCOUNTER — HOSPITAL ENCOUNTER (OUTPATIENT)
Dept: ULTRASOUND IMAGING | Age: 79
Discharge: HOME | End: 2019-07-26
Attending: INTERNAL MEDICINE | Admitting: INTERNAL MEDICINE
Payer: MEDICARE

## 2019-01-01 ENCOUNTER — APPOINTMENT (OUTPATIENT)
Dept: GENERAL RADIOLOGY | Age: 79
End: 2019-01-01
Attending: EMERGENCY MEDICINE
Payer: MEDICARE

## 2019-01-01 ENCOUNTER — HOSPITAL ENCOUNTER (INPATIENT)
Age: 79
LOS: 3 days | Discharge: HOSPICE | DRG: 194 | End: 2019-09-13
Attending: HOSPITALIST | Admitting: HOSPITALIST
Payer: MEDICARE

## 2019-01-01 ENCOUNTER — APPOINTMENT (OUTPATIENT)
Dept: CT IMAGING | Age: 79
DRG: 433 | End: 2019-01-01
Attending: NURSE PRACTITIONER
Payer: MEDICARE

## 2019-01-01 ENCOUNTER — ANESTHESIA (OUTPATIENT)
Dept: INTERVENTIONAL RADIOLOGY/VASCULAR | Age: 79
DRG: 406 | End: 2019-01-01
Payer: MEDICARE

## 2019-01-01 ENCOUNTER — HOSPITAL ENCOUNTER (OUTPATIENT)
Dept: ULTRASOUND IMAGING | Age: 79
Discharge: HOME | DRG: 406 | End: 2019-08-29
Payer: MEDICARE

## 2019-01-01 ENCOUNTER — HOSPITAL ENCOUNTER (OUTPATIENT)
Dept: ULTRASOUND IMAGING | Age: 79
Discharge: HOME | End: 2019-06-25
Attending: INTERNAL MEDICINE | Admitting: INTERNAL MEDICINE
Payer: MEDICARE

## 2019-01-01 ENCOUNTER — APPOINTMENT (OUTPATIENT)
Dept: ULTRASOUND IMAGING | Age: 79
DRG: 194 | End: 2019-01-01
Attending: HOSPITALIST
Payer: MEDICARE

## 2019-01-01 ENCOUNTER — APPOINTMENT (OUTPATIENT)
Dept: GENERAL RADIOLOGY | Age: 79
DRG: 194 | End: 2019-01-01
Attending: HOSPITALIST
Payer: MEDICARE

## 2019-01-01 ENCOUNTER — HOME CARE VISIT (OUTPATIENT)
Dept: SCHEDULING | Facility: HOME HEALTH | Age: 79
End: 2019-01-01
Payer: MEDICARE

## 2019-01-01 ENCOUNTER — HOSPICE ADMISSION (OUTPATIENT)
Dept: HOSPICE | Facility: HOSPICE | Age: 79
End: 2019-01-01
Payer: MEDICARE

## 2019-01-01 ENCOUNTER — APPOINTMENT (OUTPATIENT)
Dept: NUCLEAR MEDICINE | Age: 79
DRG: 394 | End: 2019-01-01
Attending: INTERNAL MEDICINE
Payer: MEDICARE

## 2019-01-01 ENCOUNTER — HOSPITAL ENCOUNTER (OUTPATIENT)
Dept: ULTRASOUND IMAGING | Age: 79
Discharge: HOME | End: 2019-03-22
Attending: RADIOLOGY | Admitting: RADIOLOGY
Payer: MEDICARE

## 2019-01-01 ENCOUNTER — HOSPITAL ENCOUNTER (OUTPATIENT)
Dept: ULTRASOUND IMAGING | Age: 79
Discharge: HOME | End: 2019-05-24
Attending: INTERNAL MEDICINE | Admitting: RADIOLOGY
Payer: MEDICARE

## 2019-01-01 ENCOUNTER — HOSPITAL ENCOUNTER (INPATIENT)
Age: 79
LOS: 2 days | Discharge: HOME | DRG: 394 | End: 2019-08-11
Attending: INTERNAL MEDICINE | Admitting: INTERNAL MEDICINE
Payer: MEDICARE

## 2019-01-01 VITALS
HEIGHT: 65 IN | OXYGEN SATURATION: 94 % | SYSTOLIC BLOOD PRESSURE: 118 MMHG | HEART RATE: 94 BPM | TEMPERATURE: 97.8 F | RESPIRATION RATE: 18 BRPM | BODY MASS INDEX: 20.83 KG/M2 | DIASTOLIC BLOOD PRESSURE: 68 MMHG | WEIGHT: 125 LBS

## 2019-01-01 VITALS
DIASTOLIC BLOOD PRESSURE: 73 MMHG | SYSTOLIC BLOOD PRESSURE: 123 MMHG | RESPIRATION RATE: 16 BRPM | BODY MASS INDEX: 25.14 KG/M2 | TEMPERATURE: 98.1 F | HEART RATE: 81 BPM | WEIGHT: 156.44 LBS | HEIGHT: 66 IN | OXYGEN SATURATION: 96 %

## 2019-01-01 VITALS
RESPIRATION RATE: 16 BRPM | SYSTOLIC BLOOD PRESSURE: 125 MMHG | BODY MASS INDEX: 25.34 KG/M2 | OXYGEN SATURATION: 98 % | HEART RATE: 88 BPM | TEMPERATURE: 97.9 F | DIASTOLIC BLOOD PRESSURE: 67 MMHG | HEIGHT: 65 IN | WEIGHT: 152.1 LBS

## 2019-01-01 VITALS
BODY MASS INDEX: 24.48 KG/M2 | HEIGHT: 66 IN | OXYGEN SATURATION: 97 % | RESPIRATION RATE: 16 BRPM | TEMPERATURE: 98 F | SYSTOLIC BLOOD PRESSURE: 145 MMHG | HEART RATE: 78 BPM | WEIGHT: 152.31 LBS | DIASTOLIC BLOOD PRESSURE: 73 MMHG

## 2019-01-01 VITALS
TEMPERATURE: 99.4 F | HEIGHT: 66 IN | HEART RATE: 85 BPM | DIASTOLIC BLOOD PRESSURE: 70 MMHG | BODY MASS INDEX: 23.46 KG/M2 | OXYGEN SATURATION: 96 % | WEIGHT: 146 LBS | SYSTOLIC BLOOD PRESSURE: 113 MMHG

## 2019-01-01 VITALS
HEIGHT: 64 IN | BODY MASS INDEX: 29.88 KG/M2 | RESPIRATION RATE: 16 BRPM | HEART RATE: 87 BPM | SYSTOLIC BLOOD PRESSURE: 109 MMHG | TEMPERATURE: 98.4 F | WEIGHT: 175.04 LBS | DIASTOLIC BLOOD PRESSURE: 65 MMHG | OXYGEN SATURATION: 97 %

## 2019-01-01 VITALS
BODY MASS INDEX: 25.88 KG/M2 | WEIGHT: 161 LBS | HEART RATE: 83 BPM | TEMPERATURE: 98 F | OXYGEN SATURATION: 98 % | SYSTOLIC BLOOD PRESSURE: 111 MMHG | HEIGHT: 66 IN | DIASTOLIC BLOOD PRESSURE: 59 MMHG | RESPIRATION RATE: 19 BRPM

## 2019-01-01 VITALS
RESPIRATION RATE: 12 BRPM | SYSTOLIC BLOOD PRESSURE: 125 MMHG | HEART RATE: 92 BPM | OXYGEN SATURATION: 97 % | TEMPERATURE: 97 F | DIASTOLIC BLOOD PRESSURE: 47 MMHG

## 2019-01-01 VITALS
HEIGHT: 65 IN | OXYGEN SATURATION: 98 % | DIASTOLIC BLOOD PRESSURE: 61 MMHG | BODY MASS INDEX: 25.16 KG/M2 | RESPIRATION RATE: 15 BRPM | TEMPERATURE: 97.2 F | SYSTOLIC BLOOD PRESSURE: 131 MMHG | WEIGHT: 151 LBS | HEART RATE: 86 BPM

## 2019-01-01 VITALS
HEIGHT: 65 IN | RESPIRATION RATE: 16 BRPM | TEMPERATURE: 97.6 F | SYSTOLIC BLOOD PRESSURE: 117 MMHG | BODY MASS INDEX: 26.16 KG/M2 | OXYGEN SATURATION: 97 % | HEART RATE: 80 BPM | WEIGHT: 157 LBS | DIASTOLIC BLOOD PRESSURE: 72 MMHG

## 2019-01-01 VITALS
WEIGHT: 144.1 LBS | HEART RATE: 87 BPM | RESPIRATION RATE: 16 BRPM | TEMPERATURE: 97.5 F | HEIGHT: 66 IN | SYSTOLIC BLOOD PRESSURE: 129 MMHG | OXYGEN SATURATION: 97 % | BODY MASS INDEX: 23.16 KG/M2 | DIASTOLIC BLOOD PRESSURE: 69 MMHG

## 2019-01-01 VITALS
OXYGEN SATURATION: 95 % | SYSTOLIC BLOOD PRESSURE: 130 MMHG | WEIGHT: 152 LBS | TEMPERATURE: 97.9 F | HEART RATE: 87 BPM | BODY MASS INDEX: 24.43 KG/M2 | DIASTOLIC BLOOD PRESSURE: 69 MMHG | HEIGHT: 66 IN

## 2019-01-01 VITALS
WEIGHT: 160 LBS | TEMPERATURE: 97.7 F | RESPIRATION RATE: 18 BRPM | DIASTOLIC BLOOD PRESSURE: 56 MMHG | BODY MASS INDEX: 26.66 KG/M2 | SYSTOLIC BLOOD PRESSURE: 138 MMHG | HEIGHT: 65 IN | OXYGEN SATURATION: 95 % | HEART RATE: 93 BPM

## 2019-01-01 VITALS
RESPIRATION RATE: 16 BRPM | HEIGHT: 65 IN | OXYGEN SATURATION: 99 % | SYSTOLIC BLOOD PRESSURE: 108 MMHG | DIASTOLIC BLOOD PRESSURE: 59 MMHG | HEART RATE: 88 BPM | TEMPERATURE: 98.1 F | WEIGHT: 157 LBS | BODY MASS INDEX: 26.16 KG/M2

## 2019-01-01 VITALS
BODY MASS INDEX: 23.63 KG/M2 | WEIGHT: 147 LBS | RESPIRATION RATE: 16 BRPM | HEART RATE: 84 BPM | HEIGHT: 66 IN | SYSTOLIC BLOOD PRESSURE: 127 MMHG | TEMPERATURE: 98.4 F | OXYGEN SATURATION: 94 % | DIASTOLIC BLOOD PRESSURE: 63 MMHG

## 2019-01-01 VITALS
DIASTOLIC BLOOD PRESSURE: 53 MMHG | SYSTOLIC BLOOD PRESSURE: 97 MMHG | HEIGHT: 65 IN | BODY MASS INDEX: 24.92 KG/M2 | RESPIRATION RATE: 16 BRPM | OXYGEN SATURATION: 96 % | HEART RATE: 94 BPM | TEMPERATURE: 97.9 F | WEIGHT: 149.56 LBS

## 2019-01-01 VITALS
WEIGHT: 149.25 LBS | BODY MASS INDEX: 24.87 KG/M2 | HEIGHT: 65 IN | SYSTOLIC BLOOD PRESSURE: 114 MMHG | OXYGEN SATURATION: 98 % | TEMPERATURE: 98.6 F | HEART RATE: 83 BPM | DIASTOLIC BLOOD PRESSURE: 67 MMHG

## 2019-01-01 VITALS
TEMPERATURE: 97.8 F | OXYGEN SATURATION: 100 % | BODY MASS INDEX: 25.31 KG/M2 | HEART RATE: 84 BPM | DIASTOLIC BLOOD PRESSURE: 78 MMHG | SYSTOLIC BLOOD PRESSURE: 133 MMHG | WEIGHT: 157.5 LBS | HEIGHT: 66 IN | RESPIRATION RATE: 16 BRPM

## 2019-01-01 VITALS
WEIGHT: 135.58 LBS | BODY MASS INDEX: 22.22 KG/M2 | SYSTOLIC BLOOD PRESSURE: 134 MMHG | OXYGEN SATURATION: 98 % | DIASTOLIC BLOOD PRESSURE: 56 MMHG | TEMPERATURE: 98.4 F | HEART RATE: 84 BPM | RESPIRATION RATE: 20 BRPM

## 2019-01-01 VITALS
DIASTOLIC BLOOD PRESSURE: 63 MMHG | HEART RATE: 82 BPM | SYSTOLIC BLOOD PRESSURE: 108 MMHG | OXYGEN SATURATION: 94 % | RESPIRATION RATE: 14 BRPM

## 2019-01-01 VITALS
SYSTOLIC BLOOD PRESSURE: 123 MMHG | OXYGEN SATURATION: 99 % | HEART RATE: 89 BPM | TEMPERATURE: 97.4 F | BODY MASS INDEX: 24.43 KG/M2 | DIASTOLIC BLOOD PRESSURE: 57 MMHG | WEIGHT: 152 LBS | RESPIRATION RATE: 13 BRPM | HEIGHT: 66 IN

## 2019-01-01 VITALS
HEART RATE: 89 BPM | RESPIRATION RATE: 14 BRPM | DIASTOLIC BLOOD PRESSURE: 65 MMHG | OXYGEN SATURATION: 98 % | SYSTOLIC BLOOD PRESSURE: 135 MMHG | TEMPERATURE: 96.7 F

## 2019-01-01 DIAGNOSIS — R18.8 OTHER ASCITES: Primary | ICD-10-CM

## 2019-01-01 DIAGNOSIS — R18.8 OTHER ASCITES: ICD-10-CM

## 2019-01-01 DIAGNOSIS — D75.89 EXTRAMEDULLARY HEMATOPOIESIS: ICD-10-CM

## 2019-01-01 DIAGNOSIS — R16.1 SPLENOMEGALY: ICD-10-CM

## 2019-01-01 DIAGNOSIS — I07.1 TRICUSPID VALVE INSUFFICIENCY, UNSPECIFIED ETIOLOGY: ICD-10-CM

## 2019-01-01 DIAGNOSIS — R10.84 ABDOMINAL PAIN, GENERALIZED: Primary | ICD-10-CM

## 2019-01-01 DIAGNOSIS — D45 POLYCYTHEMIA VERA (HCC): ICD-10-CM

## 2019-01-01 DIAGNOSIS — I35.0 AORTIC VALVE STENOSIS, ETIOLOGY OF CARDIAC VALVE DISEASE UNSPECIFIED: ICD-10-CM

## 2019-01-01 DIAGNOSIS — D72.829 LEUKOCYTOSIS, UNSPECIFIED TYPE: ICD-10-CM

## 2019-01-01 DIAGNOSIS — I27.20 PULMONARY HYPERTENSION (HCC): ICD-10-CM

## 2019-01-01 DIAGNOSIS — D45 POLYCYTHEMIA VERA (HCC): Primary | ICD-10-CM

## 2019-01-01 DIAGNOSIS — R60.0 EDEMA OF RIGHT FOOT: Primary | ICD-10-CM

## 2019-01-01 DIAGNOSIS — R18.8 CIRRHOSIS OF LIVER WITH ASCITES, UNSPECIFIED HEPATIC CIRRHOSIS TYPE (HCC): ICD-10-CM

## 2019-01-01 DIAGNOSIS — R18.8 ASCITES: ICD-10-CM

## 2019-01-01 DIAGNOSIS — L03.119 CELLULITIS OF FOOT: ICD-10-CM

## 2019-01-01 DIAGNOSIS — K74.60 CIRRHOSIS OF LIVER WITH ASCITES, UNSPECIFIED HEPATIC CIRRHOSIS TYPE (HCC): ICD-10-CM

## 2019-01-01 DIAGNOSIS — D75.89 EXTRAMEDULLARY HEMATOPOIESIS: Primary | ICD-10-CM

## 2019-01-01 DIAGNOSIS — D75.81 MYELOFIBROSIS (HCC): ICD-10-CM

## 2019-01-01 DIAGNOSIS — K62.5 RECTAL BLEEDING: Primary | ICD-10-CM

## 2019-01-01 DIAGNOSIS — R79.1 ELEVATED PARTIAL THROMBOPLASTIN TIME (PTT): ICD-10-CM

## 2019-01-01 DIAGNOSIS — Z95.2 S/P AVR (AORTIC VALVE REPLACEMENT): ICD-10-CM

## 2019-01-01 DIAGNOSIS — E86.0 DEHYDRATION: ICD-10-CM

## 2019-01-01 DIAGNOSIS — K76.1 CHRONIC PASSIVE HEPATIC CONGESTION: Primary | ICD-10-CM

## 2019-01-01 DIAGNOSIS — Z51.5 HOSPICE CARE: Primary | ICD-10-CM

## 2019-01-01 DIAGNOSIS — Z95.828 S/P TIPS (TRANSJUGULAR INTRAHEPATIC PORTOSYSTEMIC SHUNT): ICD-10-CM

## 2019-01-01 DIAGNOSIS — K76.1 CHRONIC PASSIVE HEPATIC CONGESTION: ICD-10-CM

## 2019-01-01 DIAGNOSIS — I38 VALVULAR HEART DISEASE: ICD-10-CM

## 2019-01-01 DIAGNOSIS — D64.9 ANEMIA, UNSPECIFIED TYPE: ICD-10-CM

## 2019-01-01 LAB
ABO + RH BLD: NORMAL
ALBUMIN FLD-MCNC: 1.7 G/DL
ALBUMIN SERPL-MCNC: 2.6 G/DL (ref 3.4–5)
ALBUMIN SERPL-MCNC: 2.7 G/DL (ref 3.4–5)
ALBUMIN SERPL-MCNC: 2.8 G/DL (ref 3.4–5)
ALBUMIN SERPL-MCNC: 2.9 G/DL (ref 3.4–5)
ALBUMIN SERPL-MCNC: 2.9 G/DL (ref 3.4–5)
ALBUMIN SERPL-MCNC: 3 G/DL (ref 3.4–5)
ALBUMIN SERPL-MCNC: 3 G/DL (ref 3.4–5)
ALBUMIN SERPL-MCNC: 3.1 G/DL (ref 3.4–5)
ALBUMIN SERPL-MCNC: 3.2 G/DL (ref 3.4–5)
ALBUMIN SERPL-MCNC: 3.4 G/DL (ref 3.4–5)
ALBUMIN SERPL-MCNC: 3.8 G/DL (ref 3.4–5)
ALBUMIN/GLOB SERPL: 0.7 {RATIO} (ref 0.8–1.7)
ALBUMIN/GLOB SERPL: 0.8 {RATIO} (ref 0.8–1.7)
ALBUMIN/GLOB SERPL: 1.1 {RATIO} (ref 0.8–1.7)
ALBUMIN/GLOB SERPL: 1.1 {RATIO} (ref 0.8–1.7)
ALBUMIN/GLOB SERPL: 1.2 {RATIO} (ref 0.8–1.7)
ALP SERPL-CCNC: 187 U/L (ref 45–117)
ALP SERPL-CCNC: 187 U/L (ref 45–117)
ALP SERPL-CCNC: 216 U/L (ref 45–117)
ALP SERPL-CCNC: 234 U/L (ref 45–117)
ALP SERPL-CCNC: 262 U/L (ref 45–117)
ALP SERPL-CCNC: 306 U/L (ref 45–117)
ALP SERPL-CCNC: 306 U/L (ref 45–117)
ALP SERPL-CCNC: 311 U/L (ref 45–117)
ALP SERPL-CCNC: 342 U/L (ref 45–117)
ALP SERPL-CCNC: 349 U/L (ref 45–117)
ALP SERPL-CCNC: 359 U/L (ref 45–117)
ALT SERPL-CCNC: 12 U/L (ref 13–56)
ALT SERPL-CCNC: 13 U/L (ref 13–56)
ALT SERPL-CCNC: 214 U/L (ref 13–56)
ALT SERPL-CCNC: 22 U/L (ref 13–56)
ALT SERPL-CCNC: 247 U/L (ref 13–56)
ALT SERPL-CCNC: 25 U/L (ref 13–56)
ALT SERPL-CCNC: 25 U/L (ref 13–56)
ALT SERPL-CCNC: 29 U/L (ref 13–56)
ALT SERPL-CCNC: 33 U/L (ref 13–56)
ALT SERPL-CCNC: 35 U/L (ref 13–56)
ALT SERPL-CCNC: 43 U/L (ref 13–56)
AMMONIA PLAS-SCNC: 24 UMOL/L (ref 11–32)
AMMONIA PLAS-SCNC: 35 UMOL/L (ref 11–32)
AMMONIA PLAS-SCNC: 46 UMOL/L (ref 11–32)
AMMONIA PLAS-SCNC: 58 UMOL/L (ref 11–32)
AMMONIA PLAS-SCNC: 61 UMOL/L (ref 11–32)
AMMONIA PLAS-SCNC: 67 UMOL/L (ref 11–32)
AMMONIA PLAS-SCNC: 67 UMOL/L (ref 11–32)
AMMONIA PLAS-SCNC: <10 UMOL/L (ref 11–32)
ANION GAP SERPL CALC-SCNC: 10 MMOL/L (ref 3–18)
ANION GAP SERPL CALC-SCNC: 5 MMOL/L (ref 3–18)
ANION GAP SERPL CALC-SCNC: 6 MMOL/L (ref 3–18)
ANION GAP SERPL CALC-SCNC: 7 MMOL/L (ref 3–18)
ANION GAP SERPL CALC-SCNC: 8 MMOL/L (ref 3–18)
ANION GAP SERPL CALC-SCNC: 9 MMOL/L (ref 3–18)
APPEARANCE FLD: ABNORMAL
APPEARANCE UR: ABNORMAL
APPEARANCE UR: CLEAR
APPEARANCE UR: CLEAR
APTT PPP: 37.1 SEC (ref 23–36.4)
APTT PPP: 39.4 SEC (ref 23–36.4)
APTT PPP: 39.5 SEC (ref 23–36.4)
APTT PPP: 40 SEC (ref 23–36.4)
APTT PPP: 40.1 SEC (ref 23–36.4)
AST SERPL-CCNC: 26 U/L (ref 15–37)
AST SERPL-CCNC: 29 U/L (ref 15–37)
AST SERPL-CCNC: 36 U/L (ref 10–38)
AST SERPL-CCNC: 40 U/L (ref 10–38)
AST SERPL-CCNC: 43 U/L (ref 10–38)
AST SERPL-CCNC: 43 U/L (ref 10–38)
AST SERPL-CCNC: 439 U/L (ref 10–38)
AST SERPL-CCNC: 46 U/L (ref 10–38)
AST SERPL-CCNC: 46 U/L (ref 10–38)
AST SERPL-CCNC: 489 U/L (ref 10–38)
AST SERPL-CCNC: 51 U/L (ref 10–38)
ATRIAL RATE: 86 BPM
BACTERIA SPEC CULT: NORMAL
BACTERIA URNS QL MICRO: ABNORMAL /HPF
BASOPHILS # BLD: 0 K/UL (ref 0–0.1)
BASOPHILS # BLD: 0.4 K/UL (ref 0–0.1)
BASOPHILS # BLD: 0.4 K/UL (ref 0–0.1)
BASOPHILS # BLD: 0.5 K/UL (ref 0–0.1)
BASOPHILS # BLD: 0.5 K/UL (ref 0–0.1)
BASOPHILS # BLD: 0.6 K/UL (ref 0–0.1)
BASOPHILS # BLD: 0.7 K/UL (ref 0–0.1)
BASOPHILS # BLD: 0.9 K/UL (ref 0–0.1)
BASOPHILS # BLD: 1.2 K/UL (ref 0–0.1)
BASOPHILS NFR BLD: 0 % (ref 0–3)
BASOPHILS NFR BLD: 1 % (ref 0–3)
BASOPHILS NFR BLD: 2 % (ref 0–3)
BASOPHILS NFR BLD: 3 % (ref 0–3)
BASOPHILS NFR BLD: 3 % (ref 0–3)
BASOPHILS NFR BLD: 6 % (ref 0–3)
BASOPHILS NFR BLD: 7 % (ref 0–3)
BILIRUB SERPL-MCNC: 0.6 MG/DL (ref 0.2–1)
BILIRUB SERPL-MCNC: 0.7 MG/DL (ref 0.2–1)
BILIRUB SERPL-MCNC: 0.7 MG/DL (ref 0.2–1)
BILIRUB SERPL-MCNC: 0.8 MG/DL (ref 0.2–1)
BILIRUB SERPL-MCNC: 1.8 MG/DL (ref 0.2–1)
BILIRUB SERPL-MCNC: 2.7 MG/DL (ref 0.2–1)
BILIRUB SERPL-MCNC: 3.3 MG/DL (ref 0.2–1)
BILIRUB SERPL-MCNC: 3.5 MG/DL (ref 0.2–1)
BILIRUB UR QL: NEGATIVE
BLASTS NFR BLD MANUAL: 2 %
BLOOD GROUP ANTIBODIES SERPL: NORMAL
BUN SERPL-MCNC: 24 MG/DL (ref 7–18)
BUN SERPL-MCNC: 26 MG/DL (ref 7–18)
BUN SERPL-MCNC: 28 MG/DL (ref 7–18)
BUN SERPL-MCNC: 29 MG/DL (ref 7–18)
BUN SERPL-MCNC: 33 MG/DL (ref 7–18)
BUN SERPL-MCNC: 33 MG/DL (ref 7–18)
BUN SERPL-MCNC: 34 MG/DL (ref 7–18)
BUN SERPL-MCNC: 36 MG/DL (ref 7–18)
BUN SERPL-MCNC: 36 MG/DL (ref 7–18)
BUN SERPL-MCNC: 37 MG/DL (ref 7–18)
BUN SERPL-MCNC: 37 MG/DL (ref 7–18)
BUN SERPL-MCNC: 38 MG/DL (ref 7–18)
BUN SERPL-MCNC: 38 MG/DL (ref 7–18)
BUN SERPL-MCNC: 40 MG/DL (ref 7–18)
BUN/CREAT SERPL: 22 (ref 12–20)
BUN/CREAT SERPL: 22 (ref 12–20)
BUN/CREAT SERPL: 23 (ref 12–20)
BUN/CREAT SERPL: 24 (ref 12–20)
BUN/CREAT SERPL: 24 (ref 12–20)
BUN/CREAT SERPL: 26 (ref 12–20)
BUN/CREAT SERPL: 27 (ref 12–20)
BUN/CREAT SERPL: 30 (ref 12–20)
BUN/CREAT SERPL: 31 (ref 12–20)
BUN/CREAT SERPL: 31 (ref 12–20)
BUN/CREAT SERPL: 34 (ref 12–20)
BUN/CREAT SERPL: 35 (ref 12–20)
BUN/CREAT SERPL: 37 (ref 12–20)
BUN/CREAT SERPL: 38 (ref 12–20)
CALCIUM SERPL-MCNC: 7.5 MG/DL (ref 8.5–10.1)
CALCIUM SERPL-MCNC: 7.6 MG/DL (ref 8.5–10.1)
CALCIUM SERPL-MCNC: 7.7 MG/DL (ref 8.5–10.1)
CALCIUM SERPL-MCNC: 7.8 MG/DL (ref 8.5–10.1)
CALCIUM SERPL-MCNC: 7.8 MG/DL (ref 8.5–10.1)
CALCIUM SERPL-MCNC: 8 MG/DL (ref 8.5–10.1)
CALCIUM SERPL-MCNC: 8 MG/DL (ref 8.5–10.1)
CALCIUM SERPL-MCNC: 8.2 MG/DL (ref 8.5–10.1)
CALCIUM SERPL-MCNC: 8.2 MG/DL (ref 8.5–10.1)
CALCIUM SERPL-MCNC: 8.3 MG/DL (ref 8.5–10.1)
CALCIUM SERPL-MCNC: 8.4 MG/DL (ref 8.5–10.1)
CALCIUM SERPL-MCNC: 8.5 MG/DL (ref 8.5–10.1)
CALCULATED P AXIS, ECG09: 54 DEGREES
CALCULATED R AXIS, ECG10: 65 DEGREES
CALCULATED T AXIS, ECG11: 58 DEGREES
CHLORIDE SERPL-SCNC: 102 MMOL/L (ref 100–108)
CHLORIDE SERPL-SCNC: 103 MMOL/L (ref 100–108)
CHLORIDE SERPL-SCNC: 104 MMOL/L (ref 100–111)
CHLORIDE SERPL-SCNC: 105 MMOL/L (ref 100–108)
CHLORIDE SERPL-SCNC: 105 MMOL/L (ref 100–108)
CHLORIDE SERPL-SCNC: 105 MMOL/L (ref 100–111)
CHLORIDE SERPL-SCNC: 107 MMOL/L (ref 100–111)
CHLORIDE SERPL-SCNC: 109 MMOL/L (ref 100–111)
CHLORIDE SERPL-SCNC: 109 MMOL/L (ref 100–111)
CHLORIDE SERPL-SCNC: 110 MMOL/L (ref 100–111)
CHLORIDE SERPL-SCNC: 111 MMOL/L (ref 100–111)
CHLORIDE SERPL-SCNC: 113 MMOL/L (ref 100–111)
CK MB CFR SERPL CALC: ABNORMAL % (ref 0–4)
CK MB SERPL-MCNC: <1 NG/ML (ref 5–25)
CK SERPL-CCNC: 18 U/L (ref 26–192)
CK SERPL-CCNC: 20 U/L (ref 26–192)
CO2 SERPL-SCNC: 20 MMOL/L (ref 21–32)
CO2 SERPL-SCNC: 20 MMOL/L (ref 21–32)
CO2 SERPL-SCNC: 21 MMOL/L (ref 21–32)
CO2 SERPL-SCNC: 22 MMOL/L (ref 21–32)
CO2 SERPL-SCNC: 23 MMOL/L (ref 21–32)
CO2 SERPL-SCNC: 24 MMOL/L (ref 21–32)
CO2 SERPL-SCNC: 25 MMOL/L (ref 21–32)
CO2 SERPL-SCNC: 26 MMOL/L (ref 21–32)
CO2 SERPL-SCNC: 27 MMOL/L (ref 21–32)
CO2 SERPL-SCNC: 28 MMOL/L (ref 21–32)
COLOR FLD: ABNORMAL
COLOR FLD: YELLOW
COLOR UR: ABNORMAL
COLOR UR: YELLOW
COLOR UR: YELLOW
CREAT SERPL-MCNC: 1 MG/DL (ref 0.6–1.3)
CREAT SERPL-MCNC: 1.01 MG/DL (ref 0.6–1.3)
CREAT SERPL-MCNC: 1.02 MG/DL (ref 0.6–1.3)
CREAT SERPL-MCNC: 1.07 MG/DL (ref 0.6–1.3)
CREAT SERPL-MCNC: 1.09 MG/DL (ref 0.6–1.3)
CREAT SERPL-MCNC: 1.16 MG/DL (ref 0.6–1.3)
CREAT SERPL-MCNC: 1.18 MG/DL (ref 0.6–1.3)
CREAT SERPL-MCNC: 1.19 MG/DL (ref 0.6–1.3)
CREAT SERPL-MCNC: 1.21 MG/DL (ref 0.6–1.3)
CREAT SERPL-MCNC: 1.22 MG/DL (ref 0.6–1.3)
CREAT SERPL-MCNC: 1.25 MG/DL (ref 0.6–1.3)
CREAT SERPL-MCNC: 1.27 MG/DL (ref 0.6–1.3)
CREAT SERPL-MCNC: 1.32 MG/DL (ref 0.6–1.3)
CREAT SERPL-MCNC: 1.41 MG/DL (ref 0.6–1.3)
D DIMER PPP FEU-MCNC: 6.49 UG/ML(FEU)
DIAGNOSIS, 93000: NORMAL
DIFFERENTIAL METHOD BLD: ABNORMAL
ECHO AV MEAN GRADIENT: 9.1 MMHG
ECHO AV PEAK GRADIENT: 17.1 MMHG
ECHO AV PEAK VELOCITY: 206.69 CM/S
ECHO AV VTI: 37.53 CM
ECHO IVC PROX: 2.2 CM
ECHO LA AREA 4C: 0 CM2
ECHO LA MAJOR AXIS: 6.8 CM
ECHO LA VOL 4C: 0 ML (ref 22–52)
ECHO LA VOLUME INDEX A4C: 0 ML/M2 (ref 16–28)
ECHO LV E' LATERAL VELOCITY: 6 CM/S
ECHO LV E' SEPTAL VELOCITY: 9 CM/S
ECHO LV EDV A2C: 85.7 ML
ECHO LV EDV A4C: 90.7 ML
ECHO LV EDV BP: 89 ML (ref 56–104)
ECHO LV EDV INDEX A4C: 50.4 ML/M2
ECHO LV EDV INDEX BP: 49.5 ML/M2
ECHO LV EDV NDEX A2C: 47.6 ML/M2
ECHO LV EJECTION FRACTION A2C: 47 %
ECHO LV EJECTION FRACTION A4C: 64 %
ECHO LV EJECTION FRACTION BIPLANE: 56.8 % (ref 55–100)
ECHO LV ESV A2C: 45.5 ML
ECHO LV ESV A4C: 33 ML
ECHO LV ESV BP: 38.5 ML (ref 19–49)
ECHO LV ESV INDEX A2C: 25.3 ML/M2
ECHO LV ESV INDEX A4C: 18.3 ML/M2
ECHO LV ESV INDEX BP: 21.4 ML/M2
ECHO LV INTERNAL DIMENSION DIASTOLIC: 2.81 CM (ref 3.9–5.3)
ECHO LV INTERNAL DIMENSION SYSTOLIC: 3.85 CM
ECHO LV IVSD: 0.63 CM (ref 0.6–0.9)
ECHO LV MASS 2D: 34.2 G (ref 67–162)
ECHO LV MASS INDEX 2D: 19 G/M2 (ref 43–95)
ECHO LV POSTERIOR WALL DIASTOLIC: 0.64 CM (ref 0.6–0.9)
ECHO LV POSTERIOR WALL SYSTOLIC: 0 CM
ECHO LVOT PEAK GRADIENT: 4.9 MMHG
ECHO LVOT PEAK VELOCITY: 111.05 CM/S
ECHO LVOT VTI: 23.16 CM
ECHO MV A VELOCITY: 126.23 CM/S
ECHO MV AREA PHT: 3 CM2
ECHO MV E DECELERATION TIME (DT): 238 MS
ECHO MV E VELOCITY: 146.37 CM/S
ECHO MV E/A RATIO: 1.16
ECHO MV E/E' LATERAL: 24.4
ECHO MV E/E' RATIO (AVERAGED): 20.33
ECHO MV E/E' SEPTAL: 16.26
ECHO MV PRESSURE HALF TIME (PHT): 74.1 MS
ECHO RA AREA 4C: 14.04 CM2
ECHO RA VOLUME: 33.1 ML
ECHO RV INTERNAL DIMENSION: 4.55 CM
ECHO TRICUSPID ANNULAR PEAK SYSTOLIC VELOCITY: 2 CM/S
ECHO TV REGURGITANT MAX VELOCITY: 357.43 CM/S
ECHO TV REGURGITANT PEAK GRADIENT: 51.1 MMHG
EOSINOPHIL # BLD: 0.2 K/UL (ref 0–0.4)
EOSINOPHIL # BLD: 0.2 K/UL (ref 0–0.4)
EOSINOPHIL # BLD: 0.3 K/UL (ref 0–0.4)
EOSINOPHIL # BLD: 0.4 K/UL (ref 0–0.4)
EOSINOPHIL # BLD: 0.6 K/UL (ref 0–0.4)
EOSINOPHIL # BLD: 0.6 K/UL (ref 0–0.4)
EOSINOPHIL # BLD: 0.7 K/UL (ref 0–0.4)
EOSINOPHIL # BLD: 1.6 K/UL (ref 0–0.4)
EOSINOPHIL # BLD: 1.6 K/UL (ref 0–0.4)
EOSINOPHIL # BLD: 1.9 K/UL (ref 0–0.4)
EOSINOPHIL # BLD: 2.1 K/UL (ref 0–0.4)
EOSINOPHIL # BLD: 3.7 K/UL (ref 0–0.4)
EOSINOPHIL NFR BLD: 1 % (ref 0–5)
EOSINOPHIL NFR BLD: 11 % (ref 0–5)
EOSINOPHIL NFR BLD: 2 % (ref 0–5)
EOSINOPHIL NFR BLD: 3 % (ref 0–5)
EOSINOPHIL NFR BLD: 4 % (ref 0–5)
EOSINOPHIL NFR BLD: 4 % (ref 0–5)
EOSINOPHIL NFR BLD: 5 % (ref 0–5)
EOSINOPHIL NFR BLD: 6 % (ref 0–5)
EOSINOPHIL NFR FLD MANUAL: 0 %
EOSINOPHIL NFR FLD MANUAL: 1 %
EOSINOPHIL NFR FLD MANUAL: 2 %
EOSINOPHIL NFR FLD MANUAL: 2 %
EPITH CASTS URNS QL MICRO: ABNORMAL /LPF (ref 0–5)
ERYTHROCYTE [DISTWIDTH] IN BLOOD BY AUTOMATED COUNT: 24.2 % (ref 11.6–14.5)
ERYTHROCYTE [DISTWIDTH] IN BLOOD BY AUTOMATED COUNT: 24.3 % (ref 11.6–14.5)
ERYTHROCYTE [DISTWIDTH] IN BLOOD BY AUTOMATED COUNT: 24.3 % (ref 11.6–14.5)
ERYTHROCYTE [DISTWIDTH] IN BLOOD BY AUTOMATED COUNT: 24.4 % (ref 11.6–14.5)
ERYTHROCYTE [DISTWIDTH] IN BLOOD BY AUTOMATED COUNT: 24.5 % (ref 11.6–14.5)
ERYTHROCYTE [DISTWIDTH] IN BLOOD BY AUTOMATED COUNT: 24.7 % (ref 11.6–14.5)
ERYTHROCYTE [DISTWIDTH] IN BLOOD BY AUTOMATED COUNT: 24.7 % (ref 11.6–14.5)
ERYTHROCYTE [DISTWIDTH] IN BLOOD BY AUTOMATED COUNT: 24.8 % (ref 11.6–14.5)
ERYTHROCYTE [DISTWIDTH] IN BLOOD BY AUTOMATED COUNT: 25.2 % (ref 11.6–14.5)
ERYTHROCYTE [DISTWIDTH] IN BLOOD BY AUTOMATED COUNT: 25.4 % (ref 11.6–14.5)
ERYTHROCYTE [DISTWIDTH] IN BLOOD BY AUTOMATED COUNT: 25.5 % (ref 11.6–14.5)
ERYTHROCYTE [DISTWIDTH] IN BLOOD BY AUTOMATED COUNT: 26.1 % (ref 11.6–14.5)
ERYTHROCYTE [DISTWIDTH] IN BLOOD BY AUTOMATED COUNT: 26.2 % (ref 11.6–14.5)
ERYTHROCYTE [DISTWIDTH] IN BLOOD BY AUTOMATED COUNT: 26.6 % (ref 11.6–14.5)
ERYTHROCYTE [DISTWIDTH] IN BLOOD BY AUTOMATED COUNT: 26.8 % (ref 11.6–14.5)
GLOBULIN SER CALC-MCNC: 2.8 G/DL (ref 2–4)
GLOBULIN SER CALC-MCNC: 3.1 G/DL (ref 2–4)
GLOBULIN SER CALC-MCNC: 3.2 G/DL (ref 2–4)
GLOBULIN SER CALC-MCNC: 3.2 G/DL (ref 2–4)
GLOBULIN SER CALC-MCNC: 3.4 G/DL (ref 2–4)
GLOBULIN SER CALC-MCNC: 3.5 G/DL (ref 2–4)
GLOBULIN SER CALC-MCNC: 3.8 G/DL (ref 2–4)
GLOBULIN SER CALC-MCNC: 3.9 G/DL (ref 2–4)
GLOBULIN SER CALC-MCNC: 4 G/DL (ref 2–4)
GLOBULIN SER CALC-MCNC: 4.1 G/DL (ref 2–4)
GLOBULIN SER CALC-MCNC: 4.3 G/DL (ref 2–4)
GLUCOSE SERPL-MCNC: 113 MG/DL (ref 74–99)
GLUCOSE SERPL-MCNC: 130 MG/DL (ref 74–99)
GLUCOSE SERPL-MCNC: 60 MG/DL (ref 74–99)
GLUCOSE SERPL-MCNC: 74 MG/DL (ref 74–99)
GLUCOSE SERPL-MCNC: 77 MG/DL (ref 74–99)
GLUCOSE SERPL-MCNC: 79 MG/DL (ref 74–99)
GLUCOSE SERPL-MCNC: 84 MG/DL (ref 74–99)
GLUCOSE SERPL-MCNC: 86 MG/DL (ref 74–99)
GLUCOSE SERPL-MCNC: 87 MG/DL (ref 74–99)
GLUCOSE SERPL-MCNC: 88 MG/DL (ref 74–99)
GLUCOSE SERPL-MCNC: 88 MG/DL (ref 74–99)
GLUCOSE SERPL-MCNC: 91 MG/DL (ref 74–99)
GLUCOSE SERPL-MCNC: 92 MG/DL (ref 74–99)
GLUCOSE SERPL-MCNC: 93 MG/DL (ref 74–99)
GLUCOSE UR STRIP.AUTO-MCNC: NEGATIVE MG/DL
GRAN CASTS URNS QL MICRO: ABNORMAL /LPF
GRAN CASTS URNS QL MICRO: ABNORMAL /LPF
HCT VFR BLD AUTO: 28.1 % (ref 35–45)
HCT VFR BLD AUTO: 28.7 % (ref 35–45)
HCT VFR BLD AUTO: 29.1 % (ref 35–45)
HCT VFR BLD AUTO: 30.4 % (ref 35–45)
HCT VFR BLD AUTO: 31 % (ref 35–45)
HCT VFR BLD AUTO: 31.1 % (ref 35–45)
HCT VFR BLD AUTO: 31.1 % (ref 35–45)
HCT VFR BLD AUTO: 31.3 % (ref 35–45)
HCT VFR BLD AUTO: 31.3 % (ref 35–45)
HCT VFR BLD AUTO: 31.5 % (ref 35–45)
HCT VFR BLD AUTO: 32.7 % (ref 35–45)
HCT VFR BLD AUTO: 33.2 % (ref 35–45)
HCT VFR BLD AUTO: 34 % (ref 35–45)
HCT VFR BLD AUTO: 35.2 % (ref 35–45)
HCT VFR BLD AUTO: 35.8 % (ref 35–45)
HCT VFR BLD AUTO: 36 % (ref 35–45)
HCT VFR BLD AUTO: 36.2 % (ref 35–45)
HEMOCCULT STL QL: NEGATIVE
HGB BLD-MCNC: 10.5 G/DL (ref 12–16)
HGB BLD-MCNC: 10.6 G/DL (ref 12–16)
HGB BLD-MCNC: 10.7 G/DL (ref 12–16)
HGB BLD-MCNC: 11.1 G/DL (ref 12–16)
HGB BLD-MCNC: 8.5 G/DL (ref 12–16)
HGB BLD-MCNC: 9 G/DL (ref 12–16)
HGB BLD-MCNC: 9.2 G/DL (ref 12–16)
HGB BLD-MCNC: 9.2 G/DL (ref 12–16)
HGB BLD-MCNC: 9.4 G/DL (ref 12–16)
HGB BLD-MCNC: 9.4 G/DL (ref 12–16)
HGB BLD-MCNC: 9.5 G/DL (ref 12–16)
HGB BLD-MCNC: 9.9 G/DL (ref 12–16)
HGB BLD-MCNC: 9.9 G/DL (ref 12–16)
HGB UR QL STRIP: ABNORMAL
HGB UR QL STRIP: NEGATIVE
HGB UR QL STRIP: NEGATIVE
HYALINE CASTS URNS QL MICRO: ABNORMAL /LPF (ref 0–2)
INR PPP: 1.1 (ref 0.8–1.2)
INR PPP: 1.1 (ref 0.8–1.2)
INR PPP: 1.2 (ref 0.8–1.2)
INR PPP: 1.5 (ref 0.8–1.2)
INR PPP: 1.6 (ref 0.8–1.2)
INR PPP: 1.8 (ref 0.8–1.2)
INR PPP: 1.9 (ref 0.8–1.2)
KETONES UR QL STRIP.AUTO: NEGATIVE MG/DL
LEUKOCYTE ESTERASE UR QL STRIP.AUTO: ABNORMAL
LEUKOCYTE ESTERASE UR QL STRIP.AUTO: ABNORMAL
LEUKOCYTE ESTERASE UR QL STRIP.AUTO: NEGATIVE
LIPASE SERPL-CCNC: 104 U/L (ref 73–393)
LIPASE SERPL-CCNC: 89 U/L (ref 73–393)
LYMPHOCYTES # BLD: 0.6 K/UL (ref 0.8–3.5)
LYMPHOCYTES # BLD: 0.6 K/UL (ref 0.8–3.5)
LYMPHOCYTES # BLD: 0.9 K/UL (ref 0.8–3.5)
LYMPHOCYTES # BLD: 1.1 K/UL (ref 0.8–3.5)
LYMPHOCYTES # BLD: 1.1 K/UL (ref 0.8–3.5)
LYMPHOCYTES # BLD: 1.3 K/UL (ref 0.8–3.5)
LYMPHOCYTES # BLD: 1.5 K/UL (ref 0.8–3.5)
LYMPHOCYTES # BLD: 1.5 K/UL (ref 0.8–3.5)
LYMPHOCYTES # BLD: 1.7 K/UL (ref 0.8–3.5)
LYMPHOCYTES # BLD: 2 K/UL (ref 0.8–3.5)
LYMPHOCYTES # BLD: 2.1 K/UL (ref 0.8–3.5)
LYMPHOCYTES # BLD: 3.5 K/UL (ref 0.8–3.5)
LYMPHOCYTES NFR BLD: 1 % (ref 20–51)
LYMPHOCYTES NFR BLD: 10 % (ref 20–51)
LYMPHOCYTES NFR BLD: 2 % (ref 20–51)
LYMPHOCYTES NFR BLD: 3 % (ref 20–51)
LYMPHOCYTES NFR BLD: 4 % (ref 20–51)
LYMPHOCYTES NFR BLD: 5 % (ref 20–51)
LYMPHOCYTES NFR BLD: 5 % (ref 20–51)
LYMPHOCYTES NFR BLD: 6 % (ref 20–51)
LYMPHOCYTES NFR BLD: 6 % (ref 20–51)
LYMPHOCYTES NFR BLD: 7 % (ref 20–51)
LYMPHOCYTES NFR BLD: 9 % (ref 20–51)
LYMPHOCYTES NFR BLD: 9 % (ref 20–51)
LYMPHOCYTES NFR FLD: 11 %
LYMPHOCYTES NFR FLD: 17 %
LYMPHOCYTES NFR FLD: 19 %
LYMPHOCYTES NFR FLD: 24 %
LYMPHOCYTES NFR FLD: 29 %
LYMPHOCYTES NFR FLD: 30 %
LYMPHOCYTES NFR FLD: 37 %
LYMPHOCYTES NFR FLD: 50 %
MACROPHAGES NFR FLD: 10 %
MACROPHAGES NFR FLD: 19 %
MACROPHAGES NFR FLD: 36 %
MACROPHAGES NFR FLD: 44 %
MACROPHAGES NFR FLD: 46 %
MACROPHAGES NFR FLD: 51 %
MACROPHAGES NFR FLD: 69 %
MAGNESIUM SERPL-MCNC: 2 MG/DL (ref 1.6–2.6)
MAGNESIUM SERPL-MCNC: 2.2 MG/DL (ref 1.6–2.6)
MAGNESIUM SERPL-MCNC: 2.2 MG/DL (ref 1.6–2.6)
MAGNESIUM SERPL-MCNC: 2.3 MG/DL (ref 1.6–2.6)
MAGNESIUM SERPL-MCNC: 2.4 MG/DL (ref 1.6–2.6)
MCH RBC QN AUTO: 22.2 PG (ref 24–34)
MCH RBC QN AUTO: 22.4 PG (ref 24–34)
MCH RBC QN AUTO: 22.6 PG (ref 24–34)
MCH RBC QN AUTO: 22.7 PG (ref 24–34)
MCH RBC QN AUTO: 22.8 PG (ref 24–34)
MCH RBC QN AUTO: 23 PG (ref 24–34)
MCH RBC QN AUTO: 23.1 PG (ref 24–34)
MCH RBC QN AUTO: 23.2 PG (ref 24–34)
MCH RBC QN AUTO: 23.3 PG (ref 24–34)
MCH RBC QN AUTO: 23.4 PG (ref 24–34)
MCH RBC QN AUTO: 23.9 PG (ref 24–34)
MCH RBC QN AUTO: 24.2 PG (ref 24–34)
MCH RBC QN AUTO: 24.8 PG (ref 24–34)
MCHC RBC AUTO-ENTMCNC: 28.8 G/DL (ref 31–37)
MCHC RBC AUTO-ENTMCNC: 29 G/DL (ref 31–37)
MCHC RBC AUTO-ENTMCNC: 29.1 G/DL (ref 31–37)
MCHC RBC AUTO-ENTMCNC: 29.2 G/DL (ref 31–37)
MCHC RBC AUTO-ENTMCNC: 29.6 G/DL (ref 31–37)
MCHC RBC AUTO-ENTMCNC: 29.8 G/DL (ref 31–37)
MCHC RBC AUTO-ENTMCNC: 30.2 G/DL (ref 31–37)
MCHC RBC AUTO-ENTMCNC: 30.8 G/DL (ref 31–37)
MCV RBC AUTO: 75.5 FL (ref 74–97)
MCV RBC AUTO: 75.5 FL (ref 74–97)
MCV RBC AUTO: 76 FL (ref 74–97)
MCV RBC AUTO: 76.4 FL (ref 74–97)
MCV RBC AUTO: 76.7 FL (ref 74–97)
MCV RBC AUTO: 76.8 FL (ref 74–97)
MCV RBC AUTO: 77.6 FL (ref 74–97)
MCV RBC AUTO: 77.8 FL (ref 74–97)
MCV RBC AUTO: 77.9 FL (ref 74–97)
MCV RBC AUTO: 78.3 FL (ref 74–97)
MCV RBC AUTO: 78.4 FL (ref 74–97)
MCV RBC AUTO: 80.2 FL (ref 74–97)
MCV RBC AUTO: 80.5 FL (ref 74–97)
MCV RBC AUTO: 80.9 FL (ref 74–97)
MCV RBC AUTO: 83.7 FL (ref 74–97)
METAMYELOCYTES NFR BLD MANUAL: 1 %
METAMYELOCYTES NFR BLD MANUAL: 2 %
METAMYELOCYTES NFR BLD MANUAL: 3 %
METAMYELOCYTES NFR BLD MANUAL: 5 %
METAMYELOCYTES NFR BLD MANUAL: 5 %
METAMYELOCYTES NFR BLD MANUAL: 7 %
METAMYELOCYTES NFR BLD MANUAL: 8 %
MONOCYTES # BLD: 0.2 K/UL (ref 0–1)
MONOCYTES # BLD: 0.3 K/UL (ref 0–1)
MONOCYTES # BLD: 0.5 K/UL (ref 0–1)
MONOCYTES # BLD: 0.5 K/UL (ref 0–1)
MONOCYTES # BLD: 0.6 K/UL (ref 0–1)
MONOCYTES # BLD: 0.7 K/UL (ref 0–1)
MONOCYTES # BLD: 1 K/UL (ref 0–1)
MONOCYTES # BLD: 1.2 K/UL (ref 0–1)
MONOCYTES # BLD: 1.2 K/UL (ref 0–1)
MONOCYTES # BLD: 1.4 K/UL (ref 0–1)
MONOCYTES NFR BLD: 1 % (ref 2–9)
MONOCYTES NFR BLD: 1 % (ref 2–9)
MONOCYTES NFR BLD: 2 % (ref 2–9)
MONOCYTES NFR BLD: 3 % (ref 2–9)
MONOCYTES NFR BLD: 4 % (ref 2–9)
MONOCYTES NFR BLD: 4 % (ref 2–9)
MONOCYTES NFR FLD: 0 %
MONOCYTES NFR FLD: 0 %
MONOCYTES NFR FLD: 10 %
MONOCYTES NFR FLD: 4 %
MONOCYTES NFR FLD: 4 %
MONOCYTES NFR FLD: 41 %
MONOCYTES NFR FLD: 6 %
MONOCYTES NFR FLD: 8 %
MYELOCYTES NFR BLD MANUAL: 1 %
MYELOCYTES NFR BLD MANUAL: 2 %
MYELOCYTES NFR BLD MANUAL: 3 %
MYELOCYTES NFR BLD MANUAL: 4 %
MYELOCYTES NFR BLD MANUAL: 6 %
MYELOCYTES NFR BLD MANUAL: 8 %
NEUTROPHILS NFR FLD: 16 %
NEUTROPHILS NFR FLD: 16 %
NEUTROPHILS NFR FLD: 21 %
NEUTROPHILS NFR FLD: 23 %
NEUTROPHILS NFR FLD: 24 %
NEUTROPHILS NFR FLD: 30 %
NEUTROPHILS NFR FLD: 32 %
NEUTROPHILS NFR FLD: 59 %
NEUTS BAND # FLD: 0 %
NEUTS BAND # FLD: 1 %
NEUTS BAND # FLD: 1 %
NEUTS BAND # FLD: 7 %
NEUTS BAND NFR BLD MANUAL: 11 % (ref 0–5)
NEUTS BAND NFR BLD MANUAL: 13 % (ref 0–5)
NEUTS BAND NFR BLD MANUAL: 13 % (ref 0–5)
NEUTS BAND NFR BLD MANUAL: 16 % (ref 0–5)
NEUTS BAND NFR BLD MANUAL: 4 % (ref 0–5)
NEUTS BAND NFR BLD MANUAL: 4 % (ref 0–5)
NEUTS BAND NFR BLD MANUAL: 6 % (ref 0–5)
NEUTS BAND NFR BLD MANUAL: 7 % (ref 0–5)
NEUTS BAND NFR BLD MANUAL: 7 % (ref 0–5)
NEUTS BAND NFR BLD MANUAL: 9 % (ref 0–5)
NEUTS SEG # BLD: 10.6 K/UL (ref 1.8–8)
NEUTS SEG # BLD: 11.1 K/UL (ref 1.8–8)
NEUTS SEG # BLD: 11.7 K/UL (ref 1.8–8)
NEUTS SEG # BLD: 14.4 K/UL (ref 1.8–8)
NEUTS SEG # BLD: 21.1 K/UL (ref 1.8–8)
NEUTS SEG # BLD: 24.7 K/UL (ref 1.8–8)
NEUTS SEG # BLD: 27.3 K/UL (ref 1.8–8)
NEUTS SEG # BLD: 29.8 K/UL (ref 1.8–8)
NEUTS SEG # BLD: 32.6 K/UL (ref 1.8–8)
NEUTS SEG # BLD: 39.8 K/UL (ref 1.8–8)
NEUTS SEG # BLD: 53 K/UL (ref 1.8–8)
NEUTS SEG # BLD: 7.7 K/UL (ref 1.8–8)
NEUTS SEG NFR BLD: 53 % (ref 42–75)
NEUTS SEG NFR BLD: 60 % (ref 42–75)
NEUTS SEG NFR BLD: 65 % (ref 42–75)
NEUTS SEG NFR BLD: 65 % (ref 42–75)
NEUTS SEG NFR BLD: 70 % (ref 42–75)
NEUTS SEG NFR BLD: 71 % (ref 42–75)
NEUTS SEG NFR BLD: 76 % (ref 42–75)
NEUTS SEG NFR BLD: 76 % (ref 42–75)
NEUTS SEG NFR BLD: 77 % (ref 42–75)
NEUTS SEG NFR BLD: 79 % (ref 42–75)
NEUTS SEG NFR BLD: 83 % (ref 42–75)
NEUTS SEG NFR BLD: 90 % (ref 42–75)
NITRITE UR QL STRIP.AUTO: NEGATIVE
NRBC BLD-RTO: 1 PER 100 WBC
NRBC BLD-RTO: 2 PER 100 WBC
NRBC BLD-RTO: 2 PER 100 WBC
NRBC BLD-RTO: 3 PER 100 WBC
NRBC BLD-RTO: 3 PER 100 WBC
NRBC BLD-RTO: 7 PER 100 WBC
NUC CELL # FLD: 130 /CU MM
NUC CELL # FLD: 146 /CU MM
NUC CELL # FLD: 150 /CU MM
NUC CELL # FLD: 225 /CU MM
NUC CELL # FLD: 248 /CU MM
NUC CELL # FLD: 435 /CU MM
NUC CELL # FLD: 580 /CU MM
NUC CELL # FLD: 628 /CU MM
P-R INTERVAL, ECG05: 144 MS
PERIPHERAL SMEAR,PSM: NORMAL
PH UR STRIP: 5 [PH] (ref 5–8)
PLATELET # BLD AUTO: 118 K/UL (ref 135–420)
PLATELET # BLD AUTO: 118 K/UL (ref 135–420)
PLATELET # BLD AUTO: 122 K/UL (ref 135–420)
PLATELET # BLD AUTO: 122 K/UL (ref 135–420)
PLATELET # BLD AUTO: 165 K/UL (ref 135–420)
PLATELET # BLD AUTO: 189 K/UL (ref 135–420)
PLATELET # BLD AUTO: 194 K/UL (ref 135–420)
PLATELET # BLD AUTO: 199 K/UL (ref 135–420)
PLATELET # BLD AUTO: 202 K/UL (ref 135–420)
PLATELET # BLD AUTO: 216 K/UL (ref 135–420)
PLATELET # BLD AUTO: 220 K/UL (ref 135–420)
PLATELET # BLD AUTO: 283 K/UL (ref 135–420)
PLATELET # BLD AUTO: 83 K/UL (ref 135–420)
PLATELET # BLD AUTO: 90 K/UL (ref 135–420)
PLATELET COMMENTS,PCOM: ABNORMAL
POTASSIUM SERPL-SCNC: 3.9 MMOL/L (ref 3.5–5.5)
POTASSIUM SERPL-SCNC: 4.1 MMOL/L (ref 3.5–5.5)
POTASSIUM SERPL-SCNC: 4.1 MMOL/L (ref 3.5–5.5)
POTASSIUM SERPL-SCNC: 4.3 MMOL/L (ref 3.5–5.5)
POTASSIUM SERPL-SCNC: 4.4 MMOL/L (ref 3.5–5.5)
POTASSIUM SERPL-SCNC: 4.5 MMOL/L (ref 3.5–5.5)
POTASSIUM SERPL-SCNC: 4.5 MMOL/L (ref 3.5–5.5)
POTASSIUM SERPL-SCNC: 4.6 MMOL/L (ref 3.5–5.5)
POTASSIUM SERPL-SCNC: 4.8 MMOL/L (ref 3.5–5.5)
POTASSIUM SERPL-SCNC: 4.8 MMOL/L (ref 3.5–5.5)
POTASSIUM SERPL-SCNC: 5 MMOL/L (ref 3.5–5.5)
POTASSIUM SERPL-SCNC: 5.1 MMOL/L (ref 3.5–5.5)
PROMYELOCYTES NFR BLD MANUAL: 1 %
PROMYELOCYTES NFR BLD MANUAL: 1 %
PROMYELOCYTES NFR BLD MANUAL: 3 %
PROT FLD-MCNC: 3.2 G/DL
PROT SERPL-MCNC: 5.8 G/DL (ref 6.4–8.2)
PROT SERPL-MCNC: 5.9 G/DL (ref 6.4–8.2)
PROT SERPL-MCNC: 6.2 G/DL (ref 6.4–8.2)
PROT SERPL-MCNC: 6.4 G/DL (ref 6.4–8.2)
PROT SERPL-MCNC: 6.4 G/DL (ref 6.4–8.2)
PROT SERPL-MCNC: 6.6 G/DL (ref 6.4–8.2)
PROT SERPL-MCNC: 6.9 G/DL (ref 6.4–8.2)
PROT SERPL-MCNC: 6.9 G/DL (ref 6.4–8.2)
PROT SERPL-MCNC: 7 G/DL (ref 6.4–8.2)
PROT SERPL-MCNC: 7.1 G/DL (ref 6.4–8.2)
PROT SERPL-MCNC: 7.5 G/DL (ref 6.4–8.2)
PROT UR STRIP-MCNC: 100 MG/DL
PROTHROMBIN TIME: 14.1 SEC (ref 11.5–15.2)
PROTHROMBIN TIME: 14.3 SEC (ref 11.5–15.2)
PROTHROMBIN TIME: 14.5 SEC (ref 11.5–15.2)
PROTHROMBIN TIME: 14.7 SEC (ref 11.5–15.2)
PROTHROMBIN TIME: 14.8 SEC (ref 11.5–15.2)
PROTHROMBIN TIME: 17.6 SEC (ref 11.5–15.2)
PROTHROMBIN TIME: 18.8 SEC (ref 11.5–15.2)
PROTHROMBIN TIME: 20.7 SEC (ref 11.5–15.2)
PROTHROMBIN TIME: 21.6 SEC (ref 11.5–15.2)
Q-T INTERVAL, ECG07: 388 MS
QRS DURATION, ECG06: 88 MS
QTC CALCULATION (BEZET), ECG08: 464 MS
RBC # BLD AUTO: 3.63 M/UL (ref 4.2–5.3)
RBC # BLD AUTO: 3.72 M/UL (ref 4.2–5.3)
RBC # BLD AUTO: 3.8 M/UL (ref 4.2–5.3)
RBC # BLD AUTO: 3.83 M/UL (ref 4.2–5.3)
RBC # BLD AUTO: 3.87 M/UL (ref 4.2–5.3)
RBC # BLD AUTO: 3.93 M/UL (ref 4.2–5.3)
RBC # BLD AUTO: 3.96 M/UL (ref 4.2–5.3)
RBC # BLD AUTO: 4.05 M/UL (ref 4.2–5.3)
RBC # BLD AUTO: 4.06 M/UL (ref 4.2–5.3)
RBC # BLD AUTO: 4.07 M/UL (ref 4.2–5.3)
RBC # BLD AUTO: 4.28 M/UL (ref 4.2–5.3)
RBC # BLD AUTO: 4.52 M/UL (ref 4.2–5.3)
RBC # BLD AUTO: 4.59 M/UL (ref 4.2–5.3)
RBC # BLD AUTO: 4.65 M/UL (ref 4.2–5.3)
RBC # BLD AUTO: 4.67 M/UL (ref 4.2–5.3)
RBC # FLD: 325 /CU MM
RBC # FLD: 330 /CU MM
RBC # FLD: 3369 /CU MM
RBC # FLD: 3410 /CU MM
RBC # FLD: 380 /CU MM
RBC # FLD: 4600 /CU MM
RBC # FLD: 75 /CU MM
RBC # FLD: ABNORMAL /CU MM
RBC #/AREA URNS HPF: ABNORMAL /HPF (ref 0–5)
RBC #/AREA URNS HPF: ABNORMAL /HPF (ref 0–5)
RBC MORPH BLD: ABNORMAL
SERVICE CMNT-IMP: NORMAL
SODIUM SERPL-SCNC: 135 MMOL/L (ref 136–145)
SODIUM SERPL-SCNC: 137 MMOL/L (ref 136–145)
SODIUM SERPL-SCNC: 138 MMOL/L (ref 136–145)
SODIUM SERPL-SCNC: 138 MMOL/L (ref 136–145)
SODIUM SERPL-SCNC: 139 MMOL/L (ref 136–145)
SODIUM SERPL-SCNC: 140 MMOL/L (ref 136–145)
SODIUM SERPL-SCNC: 141 MMOL/L (ref 136–145)
SODIUM SERPL-SCNC: 142 MMOL/L (ref 136–145)
SODIUM SERPL-SCNC: 143 MMOL/L (ref 136–145)
SODIUM SERPL-SCNC: 144 MMOL/L (ref 136–145)
SP GR UR REFRACTOMETRY: 1.02 (ref 1–1.03)
SP GR UR REFRACTOMETRY: >1.03 (ref 1–1.03)
SP GR UR REFRACTOMETRY: >1.03 (ref 1–1.03)
SPECIMEN EXP DATE BLD: NORMAL
SPECIMEN SOURCE FLD: ABNORMAL
SPECIMEN SOURCE FLD: NORMAL
SPECIMEN SOURCE FLD: NORMAL
TROPONIN I SERPL-MCNC: <0.02 NG/ML (ref 0–0.04)
URATE CRY URNS QL MICRO: ABNORMAL
UROBILINOGEN UR QL STRIP.AUTO: 0.2 EU/DL (ref 0.2–1)
UROBILINOGEN UR QL STRIP.AUTO: 1 EU/DL (ref 0.2–1)
UROBILINOGEN UR QL STRIP.AUTO: 1 EU/DL (ref 0.2–1)
VENTRICULAR RATE, ECG03: 86 BPM
WBC # BLD AUTO: 14.6 K/UL (ref 4.6–13.2)
WBC # BLD AUTO: 15.6 K/UL (ref 4.6–13.2)
WBC # BLD AUTO: 16.7 K/UL (ref 4.6–13.2)
WBC # BLD AUTO: 17.7 K/UL (ref 4.6–13.2)
WBC # BLD AUTO: 19 K/UL (ref 4.6–13.2)
WBC # BLD AUTO: 27.9 K/UL (ref 4.6–13.2)
WBC # BLD AUTO: 28 K/UL (ref 4.6–13.2)
WBC # BLD AUTO: 31.3 K/UL (ref 4.6–13.2)
WBC # BLD AUTO: 32.4 K/UL (ref 4.6–13.2)
WBC # BLD AUTO: 33.1 K/UL (ref 4.6–13.2)
WBC # BLD AUTO: 35.4 K/UL (ref 4.6–13.2)
WBC # BLD AUTO: 39.3 K/UL (ref 4.6–13.2)
WBC # BLD AUTO: 56.4 K/UL (ref 4.6–13.2)
WBC # BLD AUTO: 61.3 K/UL (ref 4.6–13.2)
WBC # BLD AUTO: 69.8 K/UL (ref 4.6–13.2)
WBC MORPH BLD: ABNORMAL
WBC URNS QL MICRO: ABNORMAL /HPF (ref 0–5)

## 2019-01-01 PROCEDURE — 82550 ASSAY OF CK (CPK): CPT

## 2019-01-01 PROCEDURE — P9047 ALBUMIN (HUMAN), 25%, 50ML: HCPCS

## 2019-01-01 PROCEDURE — 75889 VEIN X-RAY LIVER W/HEMODYNAM: CPT

## 2019-01-01 PROCEDURE — 85049 AUTOMATED PLATELET COUNT: CPT

## 2019-01-01 PROCEDURE — 3331090004 HSPC SERVICE INTENSITY ADD-ON

## 2019-01-01 PROCEDURE — HOSPICE MEDICATION HC HH HOSPICE MEDICATION

## 2019-01-01 PROCEDURE — 80053 COMPREHEN METABOLIC PANEL: CPT

## 2019-01-01 PROCEDURE — 74011250636 HC RX REV CODE- 250/636: Performed by: HOSPITALIST

## 2019-01-01 PROCEDURE — 96361 HYDRATE IV INFUSION ADD-ON: CPT

## 2019-01-01 PROCEDURE — 74011250636 HC RX REV CODE- 250/636

## 2019-01-01 PROCEDURE — 77030032034 US GUIDE PARACENTESIS

## 2019-01-01 PROCEDURE — 81001 URINALYSIS AUTO W/SCOPE: CPT

## 2019-01-01 PROCEDURE — 74011250636 HC RX REV CODE- 250/636: Performed by: INTERNAL MEDICINE

## 2019-01-01 PROCEDURE — 85610 PROTHROMBIN TIME: CPT

## 2019-01-01 PROCEDURE — 74011000250 HC RX REV CODE- 250

## 2019-01-01 PROCEDURE — P9047 ALBUMIN (HUMAN), 25%, 50ML: HCPCS | Performed by: INTERNAL MEDICINE

## 2019-01-01 PROCEDURE — 0651 HSPC ROUTINE HOME CARE

## 2019-01-01 PROCEDURE — 65610000006 HC RM INTENSIVE CARE

## 2019-01-01 PROCEDURE — T4523 ADULT SIZE BRIEF/DIAPER LG: HCPCS

## 2019-01-01 PROCEDURE — 85025 COMPLETE CBC W/AUTO DIFF WBC: CPT

## 2019-01-01 PROCEDURE — 92610 EVALUATE SWALLOWING FUNCTION: CPT

## 2019-01-01 PROCEDURE — 82042 OTHER SOURCE ALBUMIN QUAN EA: CPT

## 2019-01-01 PROCEDURE — 99284 EMERGENCY DEPT VISIT MOD MDM: CPT

## 2019-01-01 PROCEDURE — 93005 ELECTROCARDIOGRAM TRACING: CPT

## 2019-01-01 PROCEDURE — 36415 COLL VENOUS BLD VENIPUNCTURE: CPT

## 2019-01-01 PROCEDURE — 74011250637 HC RX REV CODE- 250/637: Performed by: HOSPITALIST

## 2019-01-01 PROCEDURE — 74011250637 HC RX REV CODE- 250/637: Performed by: INTERNAL MEDICINE

## 2019-01-01 PROCEDURE — 96375 TX/PRO/DX INJ NEW DRUG ADDON: CPT

## 2019-01-01 PROCEDURE — 82140 ASSAY OF AMMONIA: CPT

## 2019-01-01 PROCEDURE — 76450000000

## 2019-01-01 PROCEDURE — 74011000258 HC RX REV CODE- 258: Performed by: HOSPITALIST

## 2019-01-01 PROCEDURE — 89051 BODY FLUID CELL COUNT: CPT

## 2019-01-01 PROCEDURE — 80048 BASIC METABOLIC PNL TOTAL CA: CPT

## 2019-01-01 PROCEDURE — 99285 EMERGENCY DEPT VISIT HI MDM: CPT

## 2019-01-01 PROCEDURE — P9047 ALBUMIN (HUMAN), 25%, 50ML: HCPCS | Performed by: HOSPITALIST

## 2019-01-01 PROCEDURE — 92526 ORAL FUNCTION THERAPY: CPT

## 2019-01-01 PROCEDURE — 65270000029 HC RM PRIVATE

## 2019-01-01 PROCEDURE — 88307 TISSUE EXAM BY PATHOLOGIST: CPT

## 2019-01-01 PROCEDURE — 74178 CT ABD&PLV WO CNTR FLWD CNTR: CPT

## 2019-01-01 PROCEDURE — 77030027138 HC INCENT SPIROMETER -A

## 2019-01-01 PROCEDURE — 85730 THROMBOPLASTIN TIME PARTIAL: CPT

## 2019-01-01 PROCEDURE — 88313 SPECIAL STAINS GROUP 2: CPT

## 2019-01-01 PROCEDURE — 49083 ABD PARACENTESIS W/IMAGING: CPT

## 2019-01-01 PROCEDURE — T4527 ADULT SIZE PULL-ON LG: HCPCS

## 2019-01-01 PROCEDURE — 96374 THER/PROPH/DIAG INJ IV PUSH: CPT

## 2019-01-01 PROCEDURE — 77030020263 HC SOL INJ SOD CL0.9% LFCR 1000ML: Performed by: INTERNAL MEDICINE

## 2019-01-01 PROCEDURE — G0299 HHS/HOSPICE OF RN EA 15 MIN: HCPCS

## 2019-01-01 PROCEDURE — 74011000250 HC RX REV CODE- 250: Performed by: HOSPITALIST

## 2019-01-01 PROCEDURE — 93971 EXTREMITY STUDY: CPT

## 2019-01-01 PROCEDURE — A9560 TC99M LABELED RBC: HCPCS

## 2019-01-01 PROCEDURE — 0W9G3ZZ DRAINAGE OF PERITONEAL CAVITY, PERCUTANEOUS APPROACH: ICD-10-PCS | Performed by: RADIOLOGY

## 2019-01-01 PROCEDURE — 06183J4 BYPASS PORTAL VEIN TO HEPATIC VEIN WITH SYNTHETIC SUBSTITUTE, PERCUTANEOUS APPROACH: ICD-10-PCS | Performed by: RADIOLOGY

## 2019-01-01 PROCEDURE — G0155 HHCP-SVS OF CSW,EA 15 MIN: HCPCS

## 2019-01-01 PROCEDURE — 96360 HYDRATION IV INFUSION INIT: CPT

## 2019-01-01 PROCEDURE — 71045 X-RAY EXAM CHEST 1 VIEW: CPT

## 2019-01-01 PROCEDURE — 74177 CT ABD & PELVIS W/CONTRAST: CPT

## 2019-01-01 PROCEDURE — 83735 ASSAY OF MAGNESIUM: CPT

## 2019-01-01 PROCEDURE — 77030021593 HC FCPS BIOP ENDOSC BSC -A: Performed by: INTERNAL MEDICINE

## 2019-01-01 PROCEDURE — 93306 TTE W/DOPPLER COMPLETE: CPT

## 2019-01-01 PROCEDURE — 85027 COMPLETE CBC AUTOMATED: CPT

## 2019-01-01 PROCEDURE — 76040000007: Performed by: INTERNAL MEDICINE

## 2019-01-01 PROCEDURE — 3336500001 HSPC ELECTION

## 2019-01-01 PROCEDURE — 77030037877 HC DRSG MEPILEX >48IN BORD MOLN -A

## 2019-01-01 PROCEDURE — 74011636320 HC RX REV CODE- 636/320: Performed by: EMERGENCY MEDICINE

## 2019-01-01 PROCEDURE — 85018 HEMOGLOBIN: CPT

## 2019-01-01 PROCEDURE — 77030040361 HC SLV COMPR DVT MDII -B

## 2019-01-01 PROCEDURE — 71275 CT ANGIOGRAPHY CHEST: CPT

## 2019-01-01 PROCEDURE — 87086 URINE CULTURE/COLONY COUNT: CPT

## 2019-01-01 PROCEDURE — 76700 US EXAM ABDOM COMPLETE: CPT

## 2019-01-01 PROCEDURE — 77030037395 IR BX TRANSCATHETER

## 2019-01-01 PROCEDURE — 83690 ASSAY OF LIPASE: CPT

## 2019-01-01 PROCEDURE — 77030012890

## 2019-01-01 PROCEDURE — 74011250636 HC RX REV CODE- 250/636: Performed by: EMERGENCY MEDICINE

## 2019-01-01 PROCEDURE — 84157 ASSAY OF PROTEIN OTHER: CPT

## 2019-01-01 PROCEDURE — 74011250636 HC RX REV CODE- 250/636: Performed by: NURSE PRACTITIONER

## 2019-01-01 PROCEDURE — 77030013416 US GUIDE PARACENTESIS

## 2019-01-01 PROCEDURE — 74011250636 HC RX REV CODE- 250/636: Performed by: RADIOLOGY

## 2019-01-01 PROCEDURE — 74011636320 HC RX REV CODE- 636/320: Performed by: HOSPITALIST

## 2019-01-01 PROCEDURE — 77010033678 HC OXYGEN DAILY

## 2019-01-01 PROCEDURE — 74011000250 HC RX REV CODE- 250: Performed by: INTERNAL MEDICINE

## 2019-01-01 PROCEDURE — 77030013687 IR INSERT TIPS HEPATIC SHUNT

## 2019-01-01 PROCEDURE — 99152 MOD SED SAME PHYS/QHP 5/>YRS: CPT

## 2019-01-01 PROCEDURE — 74011250636 HC RX REV CODE- 250/636: Performed by: SPECIALIST

## 2019-01-01 PROCEDURE — 74011250637 HC RX REV CODE- 250/637: Performed by: EMERGENCY MEDICINE

## 2019-01-01 PROCEDURE — 65660000000 HC RM CCU STEPDOWN

## 2019-01-01 PROCEDURE — 74011636320 HC RX REV CODE- 636/320: Performed by: INTERNAL MEDICINE

## 2019-01-01 PROCEDURE — T4541 LARGE DISPOSABLE UNDERPAD: HCPCS

## 2019-01-01 PROCEDURE — 85379 FIBRIN DEGRADATION QUANT: CPT

## 2019-01-01 PROCEDURE — G0156 HHCP-SVS OF AIDE,EA 15 MIN: HCPCS

## 2019-01-01 PROCEDURE — 99153 MOD SED SAME PHYS/QHP EA: CPT

## 2019-01-01 PROCEDURE — 0DJD8ZZ INSPECTION OF LOWER INTESTINAL TRACT, VIA NATURAL OR ARTIFICIAL OPENING ENDOSCOPIC: ICD-10-PCS | Performed by: INTERNAL MEDICINE

## 2019-01-01 PROCEDURE — 94762 N-INVAS EAR/PLS OXIMTRY CONT: CPT

## 2019-01-01 PROCEDURE — 82272 OCCULT BLD FECES 1-3 TESTS: CPT

## 2019-01-01 PROCEDURE — 86900 BLOOD TYPING SEROLOGIC ABO: CPT

## 2019-01-01 PROCEDURE — 74011636320 HC RX REV CODE- 636/320: Performed by: RADIOLOGY

## 2019-01-01 PROCEDURE — 76060000032 HC ANESTHESIA 0.5 TO 1 HR: Performed by: INTERNAL MEDICINE

## 2019-01-01 PROCEDURE — 73630 X-RAY EXAM OF FOOT: CPT

## 2019-01-01 PROCEDURE — 51798 US URINE CAPACITY MEASURE: CPT

## 2019-01-01 PROCEDURE — 0DJ08ZZ INSPECTION OF UPPER INTESTINAL TRACT, VIA NATURAL OR ARTIFICIAL OPENING ENDOSCOPIC: ICD-10-PCS | Performed by: INTERNAL MEDICINE

## 2019-01-01 PROCEDURE — 77030011943

## 2019-01-01 PROCEDURE — C1725 CATH, TRANSLUMIN NON-LASER: HCPCS

## 2019-01-01 RX ORDER — NEOSTIGMINE METHYLSULFATE 5 MG/5 ML
SYRINGE (ML) INTRAVENOUS AS NEEDED
Status: DISCONTINUED | OUTPATIENT
Start: 2019-01-01 | End: 2019-01-01 | Stop reason: HOSPADM

## 2019-01-01 RX ORDER — LIDOCAINE HYDROCHLORIDE 10 MG/ML
10 INJECTION, SOLUTION EPIDURAL; INFILTRATION; INTRACAUDAL; PERINEURAL ONCE
Status: COMPLETED | OUTPATIENT
Start: 2019-01-01 | End: 2019-01-01

## 2019-01-01 RX ORDER — ALBUMIN HUMAN 250 G/1000ML
12.5 SOLUTION INTRAVENOUS ONCE
Status: COMPLETED | OUTPATIENT
Start: 2019-01-01 | End: 2019-01-01

## 2019-01-01 RX ORDER — CIPROFLOXACIN 500 MG/1
500 TABLET ORAL 2 TIMES DAILY
Qty: 20 TAB | Refills: 0 | Status: SHIPPED | OUTPATIENT
Start: 2019-01-01 | End: 2019-01-01

## 2019-01-01 RX ORDER — FLUMAZENIL 0.1 MG/ML
INJECTION INTRAVENOUS
Status: DISCONTINUED
Start: 2019-01-01 | End: 2019-01-01 | Stop reason: WASHOUT

## 2019-01-01 RX ORDER — ALBUMIN HUMAN 250 G/1000ML
SOLUTION INTRAVENOUS
Status: COMPLETED
Start: 2019-01-01 | End: 2019-01-01

## 2019-01-01 RX ORDER — CEFAZOLIN SODIUM 1 G/3ML
INJECTION, POWDER, FOR SOLUTION INTRAMUSCULAR; INTRAVENOUS AS NEEDED
Status: DISCONTINUED | OUTPATIENT
Start: 2019-01-01 | End: 2019-01-01 | Stop reason: HOSPADM

## 2019-01-01 RX ORDER — SODIUM CHLORIDE 0.9 % (FLUSH) 0.9 %
5-40 SYRINGE (ML) INJECTION AS NEEDED
Status: CANCELLED | OUTPATIENT
Start: 2019-01-01

## 2019-01-01 RX ORDER — ATROPINE SULFATE 0.1 MG/ML
0.5 INJECTION INTRAVENOUS
Status: CANCELLED | OUTPATIENT
Start: 2019-01-01 | End: 2019-01-01

## 2019-01-01 RX ORDER — HEPARIN SODIUM (PORCINE) LOCK FLUSH IV SOLN 100 UNIT/ML 100 UNIT/ML
10 SOLUTION INTRAVENOUS
Status: COMPLETED | OUTPATIENT
Start: 2019-01-01 | End: 2019-01-01

## 2019-01-01 RX ORDER — FENTANYL CITRATE 50 UG/ML
25 INJECTION, SOLUTION INTRAMUSCULAR; INTRAVENOUS
Status: DISCONTINUED | OUTPATIENT
Start: 2019-01-01 | End: 2019-01-01

## 2019-01-01 RX ORDER — ASPIRIN 81 MG/1
81 TABLET ORAL DAILY
Status: DISCONTINUED | OUTPATIENT
Start: 2019-01-01 | End: 2019-01-01 | Stop reason: HOSPADM

## 2019-01-01 RX ORDER — ACETAMINOPHEN 325 MG/1
650 TABLET ORAL
Status: DISCONTINUED | OUTPATIENT
Start: 2019-01-01 | End: 2019-01-01 | Stop reason: HOSPADM

## 2019-01-01 RX ORDER — FUROSEMIDE 20 MG/1
20 TABLET ORAL DAILY
Qty: 30 TAB | Refills: 0 | Status: SHIPPED | OUTPATIENT
Start: 2019-01-01 | End: 2019-01-01

## 2019-01-01 RX ORDER — HYDROXYUREA 500 MG/1
500 CAPSULE ORAL DAILY
Status: DISCONTINUED | OUTPATIENT
Start: 2019-01-01 | End: 2019-01-01

## 2019-01-01 RX ORDER — ONDANSETRON 2 MG/ML
4 INJECTION INTRAMUSCULAR; INTRAVENOUS ONCE
Status: ACTIVE | OUTPATIENT
Start: 2019-01-01 | End: 2019-01-01

## 2019-01-01 RX ORDER — HYDROXYUREA 500 MG/1
500 CAPSULE ORAL 2 TIMES DAILY
Status: DISCONTINUED | OUTPATIENT
Start: 2019-01-01 | End: 2019-01-01 | Stop reason: HOSPADM

## 2019-01-01 RX ORDER — OMEPRAZOLE 20 MG/1
20 CAPSULE, DELAYED RELEASE ORAL DAILY
Qty: 30 CAP | Refills: 0 | Status: SHIPPED | OUTPATIENT
Start: 2019-01-01 | End: 2019-01-01

## 2019-01-01 RX ORDER — KETOROLAC TROMETHAMINE 15 MG/ML
15 INJECTION, SOLUTION INTRAMUSCULAR; INTRAVENOUS
Status: DISCONTINUED | OUTPATIENT
Start: 2019-01-01 | End: 2019-01-01 | Stop reason: HOSPADM

## 2019-01-01 RX ORDER — DIPHENOXYLATE HYDROCHLORIDE AND ATROPINE SULFATE 2.5; .025 MG/1; MG/1
2 TABLET ORAL
Status: COMPLETED | OUTPATIENT
Start: 2019-01-01 | End: 2019-01-01

## 2019-01-01 RX ORDER — PROPOFOL 10 MG/ML
INJECTION, EMULSION INTRAVENOUS AS NEEDED
Status: DISCONTINUED | OUTPATIENT
Start: 2019-01-01 | End: 2019-01-01 | Stop reason: HOSPADM

## 2019-01-01 RX ORDER — ALBUMIN HUMAN 250 G/1000ML
25 SOLUTION INTRAVENOUS AS NEEDED
Status: CANCELLED | OUTPATIENT
Start: 2019-01-01

## 2019-01-01 RX ORDER — CIPROFLOXACIN 250 MG/1
250 TABLET, FILM COATED ORAL EVERY 12 HOURS
Qty: 6 TAB | Refills: 0 | Status: SHIPPED | OUTPATIENT
Start: 2019-01-01 | End: 2019-01-01

## 2019-01-01 RX ORDER — FUROSEMIDE 10 MG/ML
20 INJECTION INTRAMUSCULAR; INTRAVENOUS DAILY
Status: DISCONTINUED | OUTPATIENT
Start: 2019-01-01 | End: 2019-01-01

## 2019-01-01 RX ORDER — MIDAZOLAM HYDROCHLORIDE 1 MG/ML
INJECTION, SOLUTION INTRAMUSCULAR; INTRAVENOUS AS NEEDED
Status: DISCONTINUED | OUTPATIENT
Start: 2019-01-01 | End: 2019-01-01 | Stop reason: HOSPADM

## 2019-01-01 RX ORDER — SODIUM CHLORIDE, SODIUM LACTATE, POTASSIUM CHLORIDE, CALCIUM CHLORIDE 600; 310; 30; 20 MG/100ML; MG/100ML; MG/100ML; MG/100ML
INJECTION, SOLUTION INTRAVENOUS
Status: DISCONTINUED | OUTPATIENT
Start: 2019-01-01 | End: 2019-01-01 | Stop reason: HOSPADM

## 2019-01-01 RX ORDER — NALOXONE HYDROCHLORIDE 0.4 MG/ML
0.4 INJECTION, SOLUTION INTRAMUSCULAR; INTRAVENOUS; SUBCUTANEOUS
Status: DISCONTINUED | OUTPATIENT
Start: 2019-01-01 | End: 2019-01-01 | Stop reason: HOSPADM

## 2019-01-01 RX ORDER — LIDOCAINE HYDROCHLORIDE 20 MG/ML
INJECTION, SOLUTION EPIDURAL; INFILTRATION; INTRACAUDAL; PERINEURAL AS NEEDED
Status: DISCONTINUED | OUTPATIENT
Start: 2019-01-01 | End: 2019-01-01 | Stop reason: HOSPADM

## 2019-01-01 RX ORDER — HYDROCODONE BITARTRATE AND ACETAMINOPHEN 5; 325 MG/1; MG/1
TABLET ORAL
Qty: 12 TAB | Refills: 0 | Status: SHIPPED | OUTPATIENT
Start: 2019-01-01 | End: 2019-01-01

## 2019-01-01 RX ORDER — PANTOPRAZOLE SODIUM 40 MG/1
40 TABLET, DELAYED RELEASE ORAL
Status: DISCONTINUED | OUTPATIENT
Start: 2019-01-01 | End: 2019-01-01 | Stop reason: HOSPADM

## 2019-01-01 RX ORDER — NALOXONE HYDROCHLORIDE 0.4 MG/ML
0.4 INJECTION, SOLUTION INTRAMUSCULAR; INTRAVENOUS; SUBCUTANEOUS AS NEEDED
Status: DISCONTINUED | OUTPATIENT
Start: 2019-01-01 | End: 2019-01-01 | Stop reason: HOSPADM

## 2019-01-01 RX ORDER — MIDAZOLAM HYDROCHLORIDE 1 MG/ML
.25-5 INJECTION, SOLUTION INTRAMUSCULAR; INTRAVENOUS
Status: DISCONTINUED | OUTPATIENT
Start: 2019-01-01 | End: 2019-01-01 | Stop reason: SDUPTHER

## 2019-01-01 RX ORDER — SPIRONOLACTONE 100 MG/1
100 TABLET, FILM COATED ORAL DAILY
Status: DISCONTINUED | OUTPATIENT
Start: 2019-01-01 | End: 2019-01-01 | Stop reason: HOSPADM

## 2019-01-01 RX ORDER — NALOXONE HYDROCHLORIDE 0.4 MG/ML
0.1 INJECTION, SOLUTION INTRAMUSCULAR; INTRAVENOUS; SUBCUTANEOUS AS NEEDED
Status: CANCELLED | OUTPATIENT
Start: 2019-01-01

## 2019-01-01 RX ORDER — HYDROXYUREA 500 MG/1
500 CAPSULE ORAL 2 TIMES DAILY WITH MEALS
Status: DISCONTINUED | OUTPATIENT
Start: 2019-01-01 | End: 2019-01-01

## 2019-01-01 RX ORDER — DIPHENHYDRAMINE HYDROCHLORIDE 50 MG/ML
12.5 INJECTION, SOLUTION INTRAMUSCULAR; INTRAVENOUS
Status: DISCONTINUED | OUTPATIENT
Start: 2019-01-01 | End: 2019-01-01 | Stop reason: HOSPADM

## 2019-01-01 RX ORDER — ALBUMIN HUMAN 250 G/1000ML
25 SOLUTION INTRAVENOUS AS NEEDED
Status: DISCONTINUED | OUTPATIENT
Start: 2019-01-01 | End: 2019-01-01 | Stop reason: HOSPADM

## 2019-01-01 RX ORDER — CLOPIDOGREL BISULFATE 75 MG/1
75 TABLET ORAL
COMMUNITY
End: 2019-01-01

## 2019-01-01 RX ORDER — HYDROXYUREA 500 MG/1
500 CAPSULE ORAL DAILY
Status: DISCONTINUED | OUTPATIENT
Start: 2019-01-01 | End: 2019-01-01 | Stop reason: HOSPADM

## 2019-01-01 RX ORDER — MORPHINE SULFATE 2 MG/ML
1 INJECTION, SOLUTION INTRAMUSCULAR; INTRAVENOUS
Status: DISCONTINUED | OUTPATIENT
Start: 2019-01-01 | End: 2019-01-01 | Stop reason: HOSPADM

## 2019-01-01 RX ORDER — FLUMAZENIL 0.1 MG/ML
0.2 INJECTION INTRAVENOUS
Status: DISCONTINUED | OUTPATIENT
Start: 2019-01-01 | End: 2019-01-01 | Stop reason: HOSPADM

## 2019-01-01 RX ORDER — FENTANYL CITRATE 50 UG/ML
INJECTION, SOLUTION INTRAMUSCULAR; INTRAVENOUS AS NEEDED
Status: DISCONTINUED | OUTPATIENT
Start: 2019-01-01 | End: 2019-01-01 | Stop reason: HOSPADM

## 2019-01-01 RX ORDER — ALBUMIN HUMAN 250 G/1000ML
25 SOLUTION INTRAVENOUS EVERY 6 HOURS
Status: DISCONTINUED | OUTPATIENT
Start: 2019-01-01 | End: 2019-01-01 | Stop reason: HOSPADM

## 2019-01-01 RX ORDER — HEPARIN SODIUM 200 [USP'U]/100ML
INJECTION, SOLUTION INTRAVENOUS
Status: COMPLETED
Start: 2019-01-01 | End: 2019-01-01

## 2019-01-01 RX ORDER — ONDANSETRON 2 MG/ML
4 INJECTION INTRAMUSCULAR; INTRAVENOUS
Status: DISCONTINUED | OUTPATIENT
Start: 2019-01-01 | End: 2019-01-01 | Stop reason: HOSPADM

## 2019-01-01 RX ORDER — SODIUM CHLORIDE 0.9 % (FLUSH) 0.9 %
5-40 SYRINGE (ML) INJECTION AS NEEDED
Status: DISCONTINUED | OUTPATIENT
Start: 2019-01-01 | End: 2019-01-01

## 2019-01-01 RX ORDER — LIDOCAINE HYDROCHLORIDE 10 MG/ML
10 INJECTION, SOLUTION EPIDURAL; INFILTRATION; INTRACAUDAL; PERINEURAL
Status: COMPLETED | OUTPATIENT
Start: 2019-01-01 | End: 2019-01-01

## 2019-01-01 RX ORDER — GLYCOPYRROLATE 0.2 MG/ML
INJECTION INTRAMUSCULAR; INTRAVENOUS AS NEEDED
Status: DISCONTINUED | OUTPATIENT
Start: 2019-01-01 | End: 2019-01-01 | Stop reason: HOSPADM

## 2019-01-01 RX ORDER — SODIUM CHLORIDE 9 MG/ML
25 INJECTION, SOLUTION INTRAVENOUS CONTINUOUS
Status: DISCONTINUED | OUTPATIENT
Start: 2019-01-01 | End: 2019-01-01

## 2019-01-01 RX ORDER — LIDOCAINE HYDROCHLORIDE 10 MG/ML
1-10 INJECTION, SOLUTION EPIDURAL; INFILTRATION; INTRACAUDAL; PERINEURAL
Status: COMPLETED | OUTPATIENT
Start: 2019-01-01 | End: 2019-01-01

## 2019-01-01 RX ORDER — ALBUMIN HUMAN 250 G/1000ML
25 SOLUTION INTRAVENOUS
Status: DISCONTINUED | OUTPATIENT
Start: 2019-01-01 | End: 2019-01-01 | Stop reason: HOSPADM

## 2019-01-01 RX ORDER — ONDANSETRON 2 MG/ML
4 INJECTION INTRAMUSCULAR; INTRAVENOUS
Status: COMPLETED | OUTPATIENT
Start: 2019-01-01 | End: 2019-01-01

## 2019-01-01 RX ORDER — FLUMAZENIL 0.1 MG/ML
0.2 INJECTION INTRAVENOUS
Status: CANCELLED | OUTPATIENT
Start: 2019-01-01 | End: 2019-01-01

## 2019-01-01 RX ORDER — CLONAZEPAM 0.5 MG/1
0.25 TABLET ORAL
Qty: 5 TAB | Refills: 0 | Status: SHIPPED | OUTPATIENT
Start: 2019-01-01

## 2019-01-01 RX ORDER — FENTANYL CITRATE 50 UG/ML
25-200 INJECTION, SOLUTION INTRAMUSCULAR; INTRAVENOUS
Status: DISCONTINUED | OUTPATIENT
Start: 2019-01-01 | End: 2019-01-01 | Stop reason: HOSPADM

## 2019-01-01 RX ORDER — ONDANSETRON 2 MG/ML
INJECTION INTRAMUSCULAR; INTRAVENOUS AS NEEDED
Status: DISCONTINUED | OUTPATIENT
Start: 2019-01-01 | End: 2019-01-01 | Stop reason: HOSPADM

## 2019-01-01 RX ORDER — NALOXONE HYDROCHLORIDE 0.4 MG/ML
0.1 INJECTION, SOLUTION INTRAMUSCULAR; INTRAVENOUS; SUBCUTANEOUS
Status: DISCONTINUED | OUTPATIENT
Start: 2019-01-01 | End: 2019-01-01 | Stop reason: HOSPADM

## 2019-01-01 RX ORDER — GUAIFENESIN 100 MG/5ML
81 LIQUID (ML) ORAL DAILY
Status: DISCONTINUED | OUTPATIENT
Start: 2019-01-01 | End: 2019-01-01 | Stop reason: HOSPADM

## 2019-01-01 RX ORDER — LEVOFLOXACIN 750 MG/1
750 TABLET ORAL DAILY
Qty: 4 TAB | Refills: 0 | Status: SHIPPED | OUTPATIENT
Start: 2019-01-01 | End: 2019-01-01

## 2019-01-01 RX ORDER — FUROSEMIDE 40 MG/1
40 TABLET ORAL DAILY
Status: DISCONTINUED | OUTPATIENT
Start: 2019-01-01 | End: 2019-01-01 | Stop reason: HOSPADM

## 2019-01-01 RX ORDER — HYDROMORPHONE HYDROCHLORIDE 2 MG/ML
0.5 INJECTION, SOLUTION INTRAMUSCULAR; INTRAVENOUS; SUBCUTANEOUS
Status: DISCONTINUED | OUTPATIENT
Start: 2019-01-01 | End: 2019-01-01 | Stop reason: HOSPADM

## 2019-01-01 RX ORDER — PHENYLEPHRINE HCL IN 0.9% NACL 1 MG/10 ML
SYRINGE (ML) INTRAVENOUS AS NEEDED
Status: DISCONTINUED | OUTPATIENT
Start: 2019-01-01 | End: 2019-01-01 | Stop reason: HOSPADM

## 2019-01-01 RX ORDER — FUROSEMIDE 20 MG/1
TABLET ORAL
COMMUNITY

## 2019-01-01 RX ORDER — SODIUM CHLORIDE 9 MG/ML
1000 INJECTION, SOLUTION INTRAVENOUS CONTINUOUS
Status: DISCONTINUED | OUTPATIENT
Start: 2019-01-01 | End: 2019-01-01 | Stop reason: HOSPADM

## 2019-01-01 RX ORDER — GUAIFENESIN 100 MG/5ML
81 LIQUID (ML) ORAL DAILY
Status: DISCONTINUED | OUTPATIENT
Start: 2019-01-01 | End: 2019-01-01

## 2019-01-01 RX ORDER — LIDOCAINE HYDROCHLORIDE 10 MG/ML
1-20 INJECTION INFILTRATION; PERINEURAL
Status: COMPLETED | OUTPATIENT
Start: 2019-01-01 | End: 2019-01-01

## 2019-01-01 RX ORDER — HYDROXYUREA 500 MG/1
500 CAPSULE ORAL DAILY
Status: ON HOLD | COMMUNITY
End: 2019-01-01 | Stop reason: SDUPTHER

## 2019-01-01 RX ORDER — HEPARIN SODIUM 5000 [USP'U]/ML
5000 INJECTION, SOLUTION INTRAVENOUS; SUBCUTANEOUS EVERY 8 HOURS
Status: DISCONTINUED | OUTPATIENT
Start: 2019-01-01 | End: 2019-01-01 | Stop reason: HOSPADM

## 2019-01-01 RX ORDER — FUROSEMIDE 10 MG/ML
40 INJECTION INTRAMUSCULAR; INTRAVENOUS DAILY
Status: DISCONTINUED | OUTPATIENT
Start: 2019-01-01 | End: 2019-01-01 | Stop reason: HOSPADM

## 2019-01-01 RX ORDER — ALBUMIN HUMAN 250 G/1000ML
25 SOLUTION INTRAVENOUS ONCE
Status: CANCELLED | OUTPATIENT
Start: 2019-01-01 | End: 2019-01-01

## 2019-01-01 RX ORDER — SODIUM CHLORIDE, SODIUM LACTATE, POTASSIUM CHLORIDE, CALCIUM CHLORIDE 600; 310; 30; 20 MG/100ML; MG/100ML; MG/100ML; MG/100ML
50 INJECTION, SOLUTION INTRAVENOUS CONTINUOUS
Status: DISCONTINUED | OUTPATIENT
Start: 2019-01-01 | End: 2019-01-01

## 2019-01-01 RX ORDER — DEXTROMETHORPHAN/PSEUDOEPHED 2.5-7.5/.8
1.2 DROPS ORAL
Status: CANCELLED | OUTPATIENT
Start: 2019-01-01

## 2019-01-01 RX ORDER — MIDAZOLAM HYDROCHLORIDE 1 MG/ML
.5-4 INJECTION, SOLUTION INTRAMUSCULAR; INTRAVENOUS
Status: CANCELLED | OUTPATIENT
Start: 2019-01-01

## 2019-01-01 RX ORDER — MIDAZOLAM HYDROCHLORIDE 1 MG/ML
.25-5 INJECTION, SOLUTION INTRAMUSCULAR; INTRAVENOUS
Status: DISCONTINUED | OUTPATIENT
Start: 2019-01-01 | End: 2019-01-01 | Stop reason: HOSPADM

## 2019-01-01 RX ORDER — FUROSEMIDE 10 MG/ML
20 INJECTION INTRAMUSCULAR; INTRAVENOUS ONCE
Status: DISCONTINUED | OUTPATIENT
Start: 2019-01-01 | End: 2019-01-01

## 2019-01-01 RX ORDER — SODIUM CHLORIDE 0.9 % (FLUSH) 0.9 %
5-40 SYRINGE (ML) INJECTION EVERY 8 HOURS
Status: CANCELLED | OUTPATIENT
Start: 2019-01-01

## 2019-01-01 RX ORDER — CEFAZOLIN SODIUM/WATER 2 G/20 ML
2 SYRINGE (ML) INTRAVENOUS ONCE
Status: ACTIVE | OUTPATIENT
Start: 2019-01-01 | End: 2019-01-01

## 2019-01-01 RX ORDER — HYDROXYUREA 500 MG/1
500 CAPSULE ORAL DAILY
COMMUNITY

## 2019-01-01 RX ORDER — MORPHINE SULFATE 2 MG/ML
1 INJECTION, SOLUTION INTRAMUSCULAR; INTRAVENOUS
Status: COMPLETED | OUTPATIENT
Start: 2019-01-01 | End: 2019-01-01

## 2019-01-01 RX ORDER — HYDROXYUREA 500 MG/1
500 CAPSULE ORAL 2 TIMES DAILY WITH MEALS
Qty: 30 CAP | Refills: 0 | Status: ON HOLD
Start: 2019-01-01 | End: 2019-01-01

## 2019-01-01 RX ORDER — SCOLOPAMINE TRANSDERMAL SYSTEM 1 MG/1
1 PATCH, EXTENDED RELEASE TRANSDERMAL
Qty: 1 PATCH | Refills: 0 | Status: SHIPPED | OUTPATIENT
Start: 2019-01-01

## 2019-01-01 RX ORDER — NALOXONE HYDROCHLORIDE 0.4 MG/ML
0.2 INJECTION, SOLUTION INTRAMUSCULAR; INTRAVENOUS; SUBCUTANEOUS AS NEEDED
Status: DISCONTINUED | OUTPATIENT
Start: 2019-01-01 | End: 2019-01-01 | Stop reason: HOSPADM

## 2019-01-01 RX ORDER — FENTANYL CITRATE 50 UG/ML
25-200 INJECTION, SOLUTION INTRAMUSCULAR; INTRAVENOUS
Status: CANCELLED | OUTPATIENT
Start: 2019-01-01

## 2019-01-01 RX ORDER — GUAIFENESIN 100 MG/5ML
81 LIQUID (ML) ORAL EVERY OTHER DAY
COMMUNITY
End: 2019-01-01

## 2019-01-01 RX ORDER — SODIUM CHLORIDE 9 MG/ML
25 INJECTION, SOLUTION INTRAVENOUS CONTINUOUS
Status: DISCONTINUED | OUTPATIENT
Start: 2019-01-01 | End: 2019-01-01 | Stop reason: HOSPADM

## 2019-01-01 RX ORDER — EPINEPHRINE 0.1 MG/ML
1 INJECTION INTRACARDIAC; INTRAVENOUS
Status: CANCELLED | OUTPATIENT
Start: 2019-01-01 | End: 2019-01-01

## 2019-01-01 RX ORDER — NALOXONE HYDROCHLORIDE 0.4 MG/ML
INJECTION, SOLUTION INTRAMUSCULAR; INTRAVENOUS; SUBCUTANEOUS
Status: DISCONTINUED
Start: 2019-01-01 | End: 2019-01-01 | Stop reason: WASHOUT

## 2019-01-01 RX ORDER — DEXAMETHASONE SODIUM PHOSPHATE 4 MG/ML
INJECTION, SOLUTION INTRA-ARTICULAR; INTRALESIONAL; INTRAMUSCULAR; INTRAVENOUS; SOFT TISSUE AS NEEDED
Status: DISCONTINUED | OUTPATIENT
Start: 2019-01-01 | End: 2019-01-01 | Stop reason: HOSPADM

## 2019-01-01 RX ORDER — SODIUM CHLORIDE 9 MG/ML
50 INJECTION, SOLUTION INTRAVENOUS CONTINUOUS
Status: DISCONTINUED | OUTPATIENT
Start: 2019-01-01 | End: 2019-01-01 | Stop reason: HOSPADM

## 2019-01-01 RX ORDER — MELATONIN
1000 DAILY
COMMUNITY
End: 2019-01-01

## 2019-01-01 RX ORDER — FENTANYL CITRATE 50 UG/ML
INJECTION, SOLUTION INTRAMUSCULAR; INTRAVENOUS
Status: COMPLETED
Start: 2019-01-01 | End: 2019-01-01

## 2019-01-01 RX ORDER — OXYCODONE AND ACETAMINOPHEN 5; 325 MG/1; MG/1
1 TABLET ORAL
Status: DISCONTINUED | OUTPATIENT
Start: 2019-01-01 | End: 2019-01-01 | Stop reason: HOSPADM

## 2019-01-01 RX ORDER — HEPARIN SODIUM 200 [USP'U]/100ML
1000 INJECTION, SOLUTION INTRAVENOUS
Status: COMPLETED | OUTPATIENT
Start: 2019-01-01 | End: 2019-01-01

## 2019-01-01 RX ORDER — SCOLOPAMINE TRANSDERMAL SYSTEM 1 MG/1
1 PATCH, EXTENDED RELEASE TRANSDERMAL
Status: DISCONTINUED | OUTPATIENT
Start: 2019-01-01 | End: 2019-01-01 | Stop reason: HOSPADM

## 2019-01-01 RX ORDER — MORPHINE SULFATE 4 MG/ML
4 INJECTION INTRAVENOUS
Status: COMPLETED | OUTPATIENT
Start: 2019-01-01 | End: 2019-01-01

## 2019-01-01 RX ORDER — FUROSEMIDE 40 MG/1
TABLET ORAL
Qty: 60 TAB | Refills: 0 | Status: SHIPPED | OUTPATIENT
Start: 2019-01-01 | End: 2019-01-01

## 2019-01-01 RX ORDER — MIDAZOLAM HYDROCHLORIDE 1 MG/ML
.5-4 INJECTION, SOLUTION INTRAMUSCULAR; INTRAVENOUS
Status: DISCONTINUED | OUTPATIENT
Start: 2019-01-01 | End: 2019-01-01 | Stop reason: HOSPADM

## 2019-01-01 RX ORDER — HYDROCODONE BITARTRATE AND ACETAMINOPHEN 5; 325 MG/1; MG/1
1 TABLET ORAL
Status: DISCONTINUED | OUTPATIENT
Start: 2019-01-01 | End: 2019-01-01 | Stop reason: HOSPADM

## 2019-01-01 RX ORDER — DIPHENHYDRAMINE HYDROCHLORIDE 50 MG/ML
50 INJECTION, SOLUTION INTRAMUSCULAR; INTRAVENOUS ONCE
Status: CANCELLED | OUTPATIENT
Start: 2019-01-01 | End: 2019-01-01

## 2019-01-01 RX ORDER — SODIUM CHLORIDE 9 MG/ML
500 INJECTION, SOLUTION INTRAVENOUS CONTINUOUS
Status: DISCONTINUED | OUTPATIENT
Start: 2019-01-01 | End: 2019-01-01 | Stop reason: HOSPADM

## 2019-01-01 RX ORDER — MIDAZOLAM HYDROCHLORIDE 1 MG/ML
INJECTION, SOLUTION INTRAMUSCULAR; INTRAVENOUS
Status: COMPLETED
Start: 2019-01-01 | End: 2019-01-01

## 2019-01-01 RX ORDER — HEPARIN SODIUM 200 [USP'U]/100ML
500 INJECTION, SOLUTION INTRAVENOUS
Status: DISCONTINUED | OUTPATIENT
Start: 2019-01-01 | End: 2019-01-01 | Stop reason: HOSPADM

## 2019-01-01 RX ORDER — FENTANYL CITRATE 50 UG/ML
100 INJECTION, SOLUTION INTRAMUSCULAR; INTRAVENOUS
Status: DISCONTINUED | OUTPATIENT
Start: 2019-01-01 | End: 2019-01-01 | Stop reason: HOSPADM

## 2019-01-01 RX ORDER — FLUMAZENIL 0.1 MG/ML
0.2 INJECTION INTRAVENOUS
Status: CANCELLED | OUTPATIENT
Start: 2019-01-01

## 2019-01-01 RX ORDER — NALOXONE HYDROCHLORIDE 0.4 MG/ML
0.4 INJECTION, SOLUTION INTRAMUSCULAR; INTRAVENOUS; SUBCUTANEOUS
Status: CANCELLED | OUTPATIENT
Start: 2019-01-01 | End: 2019-01-01

## 2019-01-01 RX ORDER — ALBUMIN HUMAN 250 G/1000ML
25 SOLUTION INTRAVENOUS ONCE
Status: ACTIVE | OUTPATIENT
Start: 2019-01-01 | End: 2019-01-01

## 2019-01-01 RX ORDER — SODIUM CHLORIDE 0.9 % (FLUSH) 0.9 %
5-40 SYRINGE (ML) INJECTION EVERY 8 HOURS
Status: DISCONTINUED | OUTPATIENT
Start: 2019-01-01 | End: 2019-01-01

## 2019-01-01 RX ORDER — ROCURONIUM BROMIDE 10 MG/ML
INJECTION, SOLUTION INTRAVENOUS AS NEEDED
Status: DISCONTINUED | OUTPATIENT
Start: 2019-01-01 | End: 2019-01-01 | Stop reason: HOSPADM

## 2019-01-01 RX ORDER — CIPROFLOXACIN 500 MG/1
500 TABLET ORAL
Status: COMPLETED | OUTPATIENT
Start: 2019-01-01 | End: 2019-01-01

## 2019-01-01 RX ORDER — LORAZEPAM 1 MG/1
0.5 TABLET ORAL ONCE
Status: COMPLETED | OUTPATIENT
Start: 2019-01-01 | End: 2019-01-01

## 2019-01-01 RX ADMIN — FENTANYL CITRATE 25 MCG: 50 INJECTION, SOLUTION INTRAMUSCULAR; INTRAVENOUS at 11:28

## 2019-01-01 RX ADMIN — PIPERACILLIN SODIUM,TAZOBACTAM SODIUM 3.38 G: 3; .375 INJECTION, POWDER, FOR SOLUTION INTRAVENOUS at 21:29

## 2019-01-01 RX ADMIN — ASPIRIN 81 MG: 81 TABLET ORAL at 09:07

## 2019-01-01 RX ADMIN — ALBUMIN HUMAN 25 G: 250 SOLUTION INTRAVENOUS at 09:02

## 2019-01-01 RX ADMIN — Medication 200 MCG: at 10:53

## 2019-01-01 RX ADMIN — FENTANYL CITRATE 25 MCG: 50 INJECTION, SOLUTION INTRAMUSCULAR; INTRAVENOUS at 11:26

## 2019-01-01 RX ADMIN — FUROSEMIDE 20 MG: 10 INJECTION, SOLUTION INTRAMUSCULAR; INTRAVENOUS at 10:16

## 2019-01-01 RX ADMIN — ASPIRIN 81 MG: 81 TABLET ORAL at 09:10

## 2019-01-01 RX ADMIN — LIDOCAINE HYDROCHLORIDE 6 ML: 10 INJECTION, SOLUTION EPIDURAL; INFILTRATION; INTRACAUDAL; PERINEURAL at 10:25

## 2019-01-01 RX ADMIN — FENTANYL CITRATE 25 MCG: 50 INJECTION, SOLUTION INTRAMUSCULAR; INTRAVENOUS at 12:21

## 2019-01-01 RX ADMIN — IOPAMIDOL 100 ML: 612 INJECTION, SOLUTION INTRAVENOUS at 16:56

## 2019-01-01 RX ADMIN — LIDOCAINE HYDROCHLORIDE 6 ML: 10 INJECTION, SOLUTION EPIDURAL; INFILTRATION; INTRACAUDAL; PERINEURAL at 07:51

## 2019-01-01 RX ADMIN — PIPERACILLIN SODIUM,TAZOBACTAM SODIUM 3.38 G: 3; .375 INJECTION, POWDER, FOR SOLUTION INTRAVENOUS at 09:02

## 2019-01-01 RX ADMIN — LIDOCAINE HYDROCHLORIDE 5 ML: 10 INJECTION, SOLUTION EPIDURAL; INFILTRATION; INTRACAUDAL; PERINEURAL at 10:25

## 2019-01-01 RX ADMIN — ALBUMIN (HUMAN) 25 G: 0.25 INJECTION, SOLUTION INTRAVENOUS at 10:46

## 2019-01-01 RX ADMIN — IOPAMIDOL 330 ML: 612 INJECTION, SOLUTION INTRAVENOUS at 11:30

## 2019-01-01 RX ADMIN — ALBUMIN HUMAN 25 G: 250 SOLUTION INTRAVENOUS at 14:55

## 2019-01-01 RX ADMIN — MIDAZOLAM HYDROCHLORIDE 0.5 MG: 1 INJECTION, SOLUTION INTRAMUSCULAR; INTRAVENOUS at 09:34

## 2019-01-01 RX ADMIN — FENTANYL CITRATE 25 MCG: 50 INJECTION, SOLUTION INTRAMUSCULAR; INTRAVENOUS at 11:32

## 2019-01-01 RX ADMIN — ALBUMIN (HUMAN) 25 G: 0.25 INJECTION, SOLUTION INTRAVENOUS at 05:30

## 2019-01-01 RX ADMIN — HEPARIN SODIUM 5000 UNITS: 5000 INJECTION INTRAVENOUS; SUBCUTANEOUS at 00:13

## 2019-01-01 RX ADMIN — ASPIRIN 81 MG: 81 TABLET, COATED ORAL at 09:16

## 2019-01-01 RX ADMIN — ALBUMIN HUMAN 25 G: 250 SOLUTION INTRAVENOUS at 14:26

## 2019-01-01 RX ADMIN — PIPERACILLIN SODIUM,TAZOBACTAM SODIUM 3.38 G: 3; .375 INJECTION, POWDER, FOR SOLUTION INTRAVENOUS at 09:32

## 2019-01-01 RX ADMIN — FENTANYL CITRATE 50 MCG: 50 INJECTION, SOLUTION INTRAMUSCULAR; INTRAVENOUS at 09:00

## 2019-01-01 RX ADMIN — HEPARIN SODIUM 5000 UNITS: 5000 INJECTION INTRAVENOUS; SUBCUTANEOUS at 15:30

## 2019-01-01 RX ADMIN — MIDAZOLAM HYDROCHLORIDE 2 MG: 1 INJECTION, SOLUTION INTRAMUSCULAR; INTRAVENOUS at 09:12

## 2019-01-01 RX ADMIN — ROCURONIUM BROMIDE 35 MG: 10 INJECTION, SOLUTION INTRAVENOUS at 10:16

## 2019-01-01 RX ADMIN — ALBUMIN (HUMAN) 25 G: 0.25 INJECTION, SOLUTION INTRAVENOUS at 10:15

## 2019-01-01 RX ADMIN — FENTANYL CITRATE 25 MCG: 50 INJECTION, SOLUTION INTRAMUSCULAR; INTRAVENOUS at 11:58

## 2019-01-01 RX ADMIN — PROPOFOL 20 MG: 10 INJECTION, EMULSION INTRAVENOUS at 09:24

## 2019-01-01 RX ADMIN — ASPIRIN 81 MG: 81 TABLET, COATED ORAL at 09:29

## 2019-01-01 RX ADMIN — MORPHINE SULFATE 4 MG: 4 INJECTION INTRAVENOUS at 04:30

## 2019-01-01 RX ADMIN — ONDANSETRON 4 MG: 2 INJECTION INTRAMUSCULAR; INTRAVENOUS at 04:30

## 2019-01-01 RX ADMIN — IOPAMIDOL 100 ML: 612 INJECTION, SOLUTION INTRAVENOUS at 16:53

## 2019-01-01 RX ADMIN — LIDOCAINE HYDROCHLORIDE 5 ML: 10 INJECTION, SOLUTION EPIDURAL; INFILTRATION; INTRACAUDAL; PERINEURAL at 08:44

## 2019-01-01 RX ADMIN — ALBUMIN (HUMAN) 25 G: 0.25 INJECTION, SOLUTION INTRAVENOUS at 05:36

## 2019-01-01 RX ADMIN — SODIUM CHLORIDE 25 ML/HR: 900 INJECTION, SOLUTION INTRAVENOUS at 08:45

## 2019-01-01 RX ADMIN — Medication 200 MCG: at 11:01

## 2019-01-01 RX ADMIN — SODIUM CHLORIDE 500 ML: 900 INJECTION, SOLUTION INTRAVENOUS at 16:33

## 2019-01-01 RX ADMIN — PIPERACILLIN SODIUM,TAZOBACTAM SODIUM 3.38 G: 3; .375 INJECTION, POWDER, FOR SOLUTION INTRAVENOUS at 23:31

## 2019-01-01 RX ADMIN — ONDANSETRON 4 MG: 2 INJECTION INTRAMUSCULAR; INTRAVENOUS at 11:47

## 2019-01-01 RX ADMIN — ROCURONIUM BROMIDE 5 MG: 10 INJECTION, SOLUTION INTRAVENOUS at 11:19

## 2019-01-01 RX ADMIN — MORPHINE SULFATE 1 MG: 2 INJECTION, SOLUTION INTRAMUSCULAR; INTRAVENOUS at 12:44

## 2019-01-01 RX ADMIN — FENTANYL CITRATE 25 MCG: 50 INJECTION, SOLUTION INTRAMUSCULAR; INTRAVENOUS at 09:34

## 2019-01-01 RX ADMIN — ALBUMIN HUMAN 25 G: 250 SOLUTION INTRAVENOUS at 09:11

## 2019-01-01 RX ADMIN — FENTANYL CITRATE 50 MCG: 50 INJECTION, SOLUTION INTRAMUSCULAR; INTRAVENOUS at 10:15

## 2019-01-01 RX ADMIN — ALBUMIN (HUMAN) 25 G: 0.25 INJECTION, SOLUTION INTRAVENOUS at 08:22

## 2019-01-01 RX ADMIN — PIPERACILLIN SODIUM,TAZOBACTAM SODIUM 3.38 G: 3; .375 INJECTION, POWDER, FOR SOLUTION INTRAVENOUS at 03:59

## 2019-01-01 RX ADMIN — HYDROXYUREA 500 MG: 500 CAPSULE ORAL at 09:16

## 2019-01-01 RX ADMIN — HEPARIN SODIUM 5000 UNITS: 5000 INJECTION INTRAVENOUS; SUBCUTANEOUS at 16:31

## 2019-01-01 RX ADMIN — SODIUM CHLORIDE 25 ML/HR: 900 INJECTION, SOLUTION INTRAVENOUS at 08:49

## 2019-01-01 RX ADMIN — PIPERACILLIN SODIUM,TAZOBACTAM SODIUM 3.38 G: 3; .375 INJECTION, POWDER, FOR SOLUTION INTRAVENOUS at 04:13

## 2019-01-01 RX ADMIN — PROPOFOL 100 MG: 10 INJECTION, EMULSION INTRAVENOUS at 10:16

## 2019-01-01 RX ADMIN — HEPARIN SODIUM 5000 UNITS: 5000 INJECTION INTRAVENOUS; SUBCUTANEOUS at 23:35

## 2019-01-01 RX ADMIN — PIPERACILLIN SODIUM,TAZOBACTAM SODIUM 3.38 G: 3; .375 INJECTION, POWDER, FOR SOLUTION INTRAVENOUS at 16:19

## 2019-01-01 RX ADMIN — LIDOCAINE HYDROCHLORIDE 10 ML: 10 INJECTION, SOLUTION EPIDURAL; INFILTRATION; INTRACAUDAL; PERINEURAL at 14:16

## 2019-01-01 RX ADMIN — PANTOPRAZOLE SODIUM 40 MG: 40 TABLET, DELAYED RELEASE ORAL at 17:11

## 2019-01-01 RX ADMIN — ALBUMIN (HUMAN) 25 G: 0.25 INJECTION, SOLUTION INTRAVENOUS at 11:27

## 2019-01-01 RX ADMIN — ALBUMIN (HUMAN) 25 G: 0.25 INJECTION, SOLUTION INTRAVENOUS at 10:01

## 2019-01-01 RX ADMIN — LIDOCAINE HYDROCHLORIDE 7 ML: 10 INJECTION, SOLUTION EPIDURAL; INFILTRATION; INTRACAUDAL; PERINEURAL at 10:18

## 2019-01-01 RX ADMIN — HYDROXYUREA 500 MG: 500 CAPSULE ORAL at 20:33

## 2019-01-01 RX ADMIN — MIDAZOLAM HYDROCHLORIDE 0.5 MG: 1 INJECTION, SOLUTION INTRAMUSCULAR; INTRAVENOUS at 09:20

## 2019-01-01 RX ADMIN — HYDROXYUREA 500 MG: 500 CAPSULE ORAL at 08:59

## 2019-01-01 RX ADMIN — FENTANYL CITRATE 25 MCG: 50 INJECTION, SOLUTION INTRAMUSCULAR; INTRAVENOUS at 09:36

## 2019-01-01 RX ADMIN — ALBUMIN (HUMAN) 25 G: 0.25 INJECTION, SOLUTION INTRAVENOUS at 14:26

## 2019-01-01 RX ADMIN — IOPAMIDOL 100 ML: 755 INJECTION, SOLUTION INTRAVENOUS at 17:31

## 2019-01-01 RX ADMIN — GLYCOPYRROLATE 0.1 MG: 0.2 INJECTION INTRAMUSCULAR; INTRAVENOUS at 11:04

## 2019-01-01 RX ADMIN — PIPERACILLIN SODIUM,TAZOBACTAM SODIUM 3.38 G: 3; .375 INJECTION, POWDER, FOR SOLUTION INTRAVENOUS at 16:30

## 2019-01-01 RX ADMIN — ALBUMIN (HUMAN) 25 G: 0.25 INJECTION, SOLUTION INTRAVENOUS at 00:15

## 2019-01-01 RX ADMIN — MORPHINE SULFATE 1 MG: 2 INJECTION, SOLUTION INTRAMUSCULAR; INTRAVENOUS at 08:58

## 2019-01-01 RX ADMIN — SODIUM CHLORIDE, SODIUM LACTATE, POTASSIUM CHLORIDE, CALCIUM CHLORIDE: 600; 310; 30; 20 INJECTION, SOLUTION INTRAVENOUS at 10:44

## 2019-01-01 RX ADMIN — HYDROXYUREA 500 MG: 500 CAPSULE ORAL at 09:38

## 2019-01-01 RX ADMIN — ALBUMIN (HUMAN) 25 G: 0.25 INJECTION, SOLUTION INTRAVENOUS at 09:11

## 2019-01-01 RX ADMIN — Medication 200 MCG: at 11:06

## 2019-01-01 RX ADMIN — Medication 100 MCG: at 10:51

## 2019-01-01 RX ADMIN — HEPARIN SODIUM 5000 UNITS: 5000 INJECTION INTRAVENOUS; SUBCUTANEOUS at 23:31

## 2019-01-01 RX ADMIN — ALBUMIN (HUMAN) 25 G: 0.25 INJECTION, SOLUTION INTRAVENOUS at 10:56

## 2019-01-01 RX ADMIN — MIDAZOLAM HYDROCHLORIDE 2 MG: 1 INJECTION, SOLUTION INTRAMUSCULAR; INTRAVENOUS at 10:14

## 2019-01-01 RX ADMIN — HYDROXYUREA 500 MG: 500 CAPSULE ORAL at 09:34

## 2019-01-01 RX ADMIN — DEXAMETHASONE SODIUM PHOSPHATE 4 MG: 4 INJECTION, SOLUTION INTRA-ARTICULAR; INTRALESIONAL; INTRAMUSCULAR; INTRAVENOUS; SOFT TISSUE at 10:39

## 2019-01-01 RX ADMIN — FENTANYL CITRATE 25 MCG: 50 INJECTION, SOLUTION INTRAMUSCULAR; INTRAVENOUS at 09:10

## 2019-01-01 RX ADMIN — FUROSEMIDE 40 MG: 10 INJECTION, SOLUTION INTRAMUSCULAR; INTRAVENOUS at 21:09

## 2019-01-01 RX ADMIN — LIDOCAINE HYDROCHLORIDE 4 ML: 10 INJECTION, SOLUTION EPIDURAL; INFILTRATION; INTRACAUDAL; PERINEURAL at 08:55

## 2019-01-01 RX ADMIN — ALBUMIN (HUMAN) 25 G: 0.25 INJECTION, SOLUTION INTRAVENOUS at 18:50

## 2019-01-01 RX ADMIN — HYDROXYUREA 500 MG: 500 CAPSULE ORAL at 09:08

## 2019-01-01 RX ADMIN — VANCOMYCIN HYDROCHLORIDE 1000 MG: 1 INJECTION, POWDER, LYOPHILIZED, FOR SOLUTION INTRAVENOUS at 20:57

## 2019-01-01 RX ADMIN — ALBUMIN (HUMAN) 25 G: 0.25 INJECTION, SOLUTION INTRAVENOUS at 17:27

## 2019-01-01 RX ADMIN — Medication 2 MG: at 12:29

## 2019-01-01 RX ADMIN — IOPAMIDOL 20 ML: 612 INJECTION, SOLUTION INTRAVENOUS at 09:15

## 2019-01-01 RX ADMIN — LACTULOSE 30 ML: 20 SOLUTION ORAL at 12:04

## 2019-01-01 RX ADMIN — MIDAZOLAM HYDROCHLORIDE 1 MG: 1 INJECTION, SOLUTION INTRAMUSCULAR; INTRAVENOUS at 09:00

## 2019-01-01 RX ADMIN — HEPARIN SODIUM 5000 UNITS: 5000 INJECTION INTRAVENOUS; SUBCUTANEOUS at 16:19

## 2019-01-01 RX ADMIN — Medication 200 MCG: at 10:45

## 2019-01-01 RX ADMIN — VANCOMYCIN HYDROCHLORIDE 1000 MG: 1 INJECTION, POWDER, LYOPHILIZED, FOR SOLUTION INTRAVENOUS at 21:09

## 2019-01-01 RX ADMIN — Medication 100 MCG: at 10:26

## 2019-01-01 RX ADMIN — PIPERACILLIN SODIUM,TAZOBACTAM SODIUM 3.38 G: 3; .375 INJECTION, POWDER, FOR SOLUTION INTRAVENOUS at 16:32

## 2019-01-01 RX ADMIN — LIDOCAINE HYDROCHLORIDE 100 MG: 20 INJECTION, SOLUTION EPIDURAL; INFILTRATION; INTRACAUDAL; PERINEURAL at 09:18

## 2019-01-01 RX ADMIN — LIDOCAINE HYDROCHLORIDE 10 ML: 10 INJECTION, SOLUTION EPIDURAL; INFILTRATION; INTRACAUDAL; PERINEURAL at 14:20

## 2019-01-01 RX ADMIN — Medication 100 MCG: at 10:33

## 2019-01-01 RX ADMIN — Medication 200 MCG: at 11:18

## 2019-01-01 RX ADMIN — MIDAZOLAM HYDROCHLORIDE 0.5 MG: 1 INJECTION, SOLUTION INTRAMUSCULAR; INTRAVENOUS at 09:10

## 2019-01-01 RX ADMIN — ASPIRIN 81 MG: 81 TABLET, COATED ORAL at 09:00

## 2019-01-01 RX ADMIN — Medication 200 MCG: at 10:38

## 2019-01-01 RX ADMIN — ALBUMIN (HUMAN) 25 G: 0.25 INJECTION, SOLUTION INTRAVENOUS at 23:31

## 2019-01-01 RX ADMIN — ALBUMIN (HUMAN) 12.5 G: 0.25 INJECTION, SOLUTION INTRAVENOUS at 17:10

## 2019-01-01 RX ADMIN — PROPOFOL 30 MG: 10 INJECTION, EMULSION INTRAVENOUS at 09:18

## 2019-01-01 RX ADMIN — SODIUM CHLORIDE 50 ML/HR: 900 INJECTION, SOLUTION INTRAVENOUS at 00:11

## 2019-01-01 RX ADMIN — ROCURONIUM BROMIDE 10 MG: 10 INJECTION, SOLUTION INTRAVENOUS at 10:38

## 2019-01-01 RX ADMIN — HEPARIN SODIUM 5000 UNITS: 5000 INJECTION INTRAVENOUS; SUBCUTANEOUS at 09:15

## 2019-01-01 RX ADMIN — Medication 200 MCG: at 10:56

## 2019-01-01 RX ADMIN — SODIUM CHLORIDE 50 ML/HR: 900 INJECTION, SOLUTION INTRAVENOUS at 17:10

## 2019-01-01 RX ADMIN — PIPERACILLIN SODIUM,TAZOBACTAM SODIUM 3.38 G: 3; .375 INJECTION, POWDER, FOR SOLUTION INTRAVENOUS at 04:50

## 2019-01-01 RX ADMIN — ALBUMIN (HUMAN) 25 G: 0.25 INJECTION, SOLUTION INTRAVENOUS at 09:02

## 2019-01-01 RX ADMIN — VANCOMYCIN HYDROCHLORIDE 1000 MG: 1 INJECTION, POWDER, LYOPHILIZED, FOR SOLUTION INTRAVENOUS at 20:47

## 2019-01-01 RX ADMIN — ALBUMIN HUMAN 25 G: 250 SOLUTION INTRAVENOUS at 10:46

## 2019-01-01 RX ADMIN — DIPHENHYDRAMINE HYDROCHLORIDE 12.5 MG: 50 INJECTION, SOLUTION INTRAMUSCULAR; INTRAVENOUS at 03:59

## 2019-01-01 RX ADMIN — LIDOCAINE HYDROCHLORIDE 60 MG: 20 INJECTION, SOLUTION EPIDURAL; INFILTRATION; INTRACAUDAL; PERINEURAL at 10:16

## 2019-01-01 RX ADMIN — CEFTRIAXONE 1 G: 1 INJECTION, POWDER, FOR SOLUTION INTRAMUSCULAR; INTRAVENOUS at 11:48

## 2019-01-01 RX ADMIN — PIPERACILLIN SODIUM,TAZOBACTAM SODIUM 3.38 G: 3; .375 INJECTION, POWDER, FOR SOLUTION INTRAVENOUS at 23:30

## 2019-01-01 RX ADMIN — LIDOCAINE HYDROCHLORIDE 3 ML: 10 INJECTION, SOLUTION EPIDURAL; INFILTRATION; INTRACAUDAL; PERINEURAL at 07:57

## 2019-01-01 RX ADMIN — HEPARIN SODIUM 2000 UNITS: 200 INJECTION, SOLUTION INTRAVENOUS at 10:45

## 2019-01-01 RX ADMIN — LIDOCAINE HYDROCHLORIDE 2 ML: 10 INJECTION, SOLUTION INFILTRATION; PERINEURAL at 09:42

## 2019-01-01 RX ADMIN — ONDANSETRON 4 MG: 2 INJECTION INTRAMUSCULAR; INTRAVENOUS at 09:12

## 2019-01-01 RX ADMIN — PANTOPRAZOLE SODIUM 40 MG: 40 TABLET, DELAYED RELEASE ORAL at 06:43

## 2019-01-01 RX ADMIN — HEPARIN SODIUM (PORCINE) LOCK FLUSH IV SOLN 100 UNIT/ML 10 UNITS: 100 SOLUTION at 16:24

## 2019-01-01 RX ADMIN — DIPHENOXYLATE HYDROCHLORIDE AND ATROPINE SULFATE 2 TABLET: 2.5; .025 TABLET ORAL at 04:32

## 2019-01-01 RX ADMIN — ALBUMIN (HUMAN) 25 G: 0.25 INJECTION, SOLUTION INTRAVENOUS at 09:26

## 2019-01-01 RX ADMIN — PANTOPRAZOLE SODIUM 40 MG: 40 TABLET, DELAYED RELEASE ORAL at 09:10

## 2019-01-01 RX ADMIN — CEFAZOLIN SODIUM 2 G: 1 INJECTION, POWDER, FOR SOLUTION INTRAMUSCULAR; INTRAVENOUS at 10:25

## 2019-01-01 RX ADMIN — ALBUMIN (HUMAN) 25 G: 0.25 INJECTION, SOLUTION INTRAVENOUS at 10:50

## 2019-01-01 RX ADMIN — ALBUMIN (HUMAN) 25 G: 0.25 INJECTION, SOLUTION INTRAVENOUS at 14:55

## 2019-01-01 RX ADMIN — CIPROFLOXACIN 500 MG: 500 TABLET, FILM COATED ORAL at 08:54

## 2019-01-01 RX ADMIN — ALBUMIN (HUMAN) 25 G: 0.25 INJECTION, SOLUTION INTRAVENOUS at 11:43

## 2019-01-01 RX ADMIN — POLYETHYLENE GLYCOL 3350, SODIUM CHLORIDE, POTASSIUM CHLORIDE, SODIUM BICARBONATE, AND SODIUM SULFATE 4000 ML: 240; 5.84; 2.98; 6.72; 22.72 POWDER, FOR SOLUTION ORAL at 18:47

## 2019-01-01 RX ADMIN — ALBUMIN (HUMAN) 25 G: 0.25 INJECTION, SOLUTION INTRAVENOUS at 18:06

## 2019-01-01 RX ADMIN — FENTANYL CITRATE 25 MCG: 50 INJECTION, SOLUTION INTRAMUSCULAR; INTRAVENOUS at 09:20

## 2019-01-01 RX ADMIN — LORAZEPAM 0.5 MG: 1 TABLET ORAL at 07:39

## 2019-01-01 RX ADMIN — HEPARIN SODIUM 5000 UNITS: 5000 INJECTION INTRAVENOUS; SUBCUTANEOUS at 09:00

## 2019-01-01 RX ADMIN — Medication 200 MCG: at 10:41

## 2019-01-01 RX ADMIN — ROCURONIUM BROMIDE 15 MG: 10 INJECTION, SOLUTION INTRAVENOUS at 10:27

## 2019-01-01 RX ADMIN — FUROSEMIDE 20 MG: 10 INJECTION, SOLUTION INTRAMUSCULAR; INTRAVENOUS at 09:44

## 2019-01-01 RX ADMIN — ALBUMIN (HUMAN) 25 G: 0.25 INJECTION, SOLUTION INTRAVENOUS at 07:10

## 2019-01-01 RX ADMIN — ALBUMIN HUMAN 25 G: 250 SOLUTION INTRAVENOUS at 10:56

## 2019-01-01 RX ADMIN — PIPERACILLIN SODIUM,TAZOBACTAM SODIUM 3.38 G: 3; .375 INJECTION, POWDER, FOR SOLUTION INTRAVENOUS at 09:18

## 2019-01-01 RX ADMIN — SODIUM CHLORIDE, SODIUM LACTATE, POTASSIUM CHLORIDE, CALCIUM CHLORIDE: 600; 310; 30; 20 INJECTION, SOLUTION INTRAVENOUS at 09:12

## 2019-01-01 RX ADMIN — ALBUMIN (HUMAN) 25 G: 0.25 INJECTION, SOLUTION INTRAVENOUS at 23:40

## 2019-01-01 RX ADMIN — HEPARIN SODIUM IN SODIUM CHLORIDE 1000 UNITS: 200 INJECTION INTRAVENOUS at 08:55

## 2019-01-01 RX ADMIN — LIDOCAINE HYDROCHLORIDE 7 ML: 10 INJECTION, SOLUTION EPIDURAL; INFILTRATION; INTRACAUDAL; PERINEURAL at 10:20

## 2019-01-01 RX ADMIN — HYDROXYUREA 500 MG: 500 CAPSULE ORAL at 09:12

## 2019-01-01 RX ADMIN — GLYCOPYRROLATE 0.2 MG: 0.2 INJECTION INTRAMUSCULAR; INTRAVENOUS at 12:29

## 2019-01-01 RX ADMIN — Medication 100 MCG: at 10:28

## 2019-02-07 NOTE — PROGRESS NOTES
[facility] [place] at [place] [address] [address], [address] W: [phone]  F: [phone] for Visit:  
Jeremy Crowder is a 70 y.o. female who is seen in consultation at the request of Dr. 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 Treatment History:  
None from us History of Present Illness:  
 
Pt seen today for chronic P Vera. Pt is seen and treated by [facility]. Seeing Dr Liset Clancy. Pt c/o splenomegaly/ hepatomegaly/ and hernia. Pt is here for a second opinon about treatment options. Pt was sent to surgery for splenectomy and surgery said too high risk. Pt is seeing Dr Ivonne Dailey and has liver problems related to heart issues. Pt has cardiac valve problems and needs heart valve surgery likely this summer. Has ascites but controlled with spironolactone every 3 days. Pt's CBCs have been good on Hydrea down to 1 a day. 
 hgb 10.4 range 9-11. Platelets 149-416J. Wbc 14-20k. Pt is ambulatory but uncomfortable due to her distended abdomen. Here alone today. Past Medical History:  
Diagnosis Date  Aortic stenosis  Arrhythmia  Breast CA ([address]  CAD (coronary artery disease)  Cancer ([address] breast, rt  
 Cellulitis of left leg 06/28/2016  
 pt denies  Ill-defined condition   
 polocythemia vera  Liver disease   
 swollen liver-swollen sleen-hernia 2017  Thromboembolus ([address] 06/2016 DVT leg leg Past Surgical History:  
Procedure Laterality Date [address] UNLISTED  BREAST SURGERY PROCEDURE UNLISTED  2005  
 part. rt.mastectomy  CARDIAC SURG PROCEDURE UNLIST 2x bypass 20 years ago  CHEST SURGERY PROCEDURE UNLISTED [facility]  HX HEENT    
 cataracts Social History Tobacco Use  Smoking status: Never Smoker  Smokeless tobacco: Never Used Substance Use Topics  Alcohol use:  Yes  
 Alcohol/week: 0.5 oz Types: 1 Glasses of wine per week Frequency: Never Comment: 2 glasses per month Family History Problem Relation Age of Onset  Migraines Maternal Grandmother  Cancer Mother  Cancer Father Current Outpatient Medications Medication Sig  
 hydroxyurea (HYDREA) 500 mg capsule Take 500 mg by mouth three (3) times daily.  spironolactone (ALDACTONE) 100 mg tablet Take  by mouth daily.  furosemide (LASIX) 40 mg tablet Take 1 Tab by mouth daily. (Patient taking differently: Take 20 mg by mouth daily.)  aspirin 81 mg chewable tablet Take 81 mg by mouth daily. No current facility-administered medications for this visit. No Known Allergies Review of Systems: A complete review of systems was obtained, negative except as described above. Physical Exam:  
 
Visit Vitals /72 Pulse 80 Temp 97.6 °F (36.4 °C) (Oral) Resp 16 Ht 5' 5\" (1.651 m) Wt 157 lb (71.2 kg) SpO2 97% BMI 26.13 kg/m² ECOG PS: 1 General: No distress Eyes: PERRLA, anicteric sclerae HENT: Atraumatic, OP clear Neck: Supple Respiratory: CTAB, normal respiratory effort CV: Normal rate, regular rhythm, no murmurs, no peripheral edema GI: Soft, distended with hepato splenomegaly and hernia. MS: Normal gait and station. Skin: No rashes, ecchymoses, or petechiae. Normal temperature, turgor, and texture. Psych: Alert, oriented, appropriate affect, normal judgment/insight Results:  
 
 
 
 
 
 
 
 
Lab Results Component Value Date/Time WBC 15.4 (H) 11/29/2018 07:40 AM  
 HGB 9.5 (L) 11/29/2018 07:40 AM  
 HCT 30.7 (L) 11/29/2018 07:40 AM  
 PLATELET 868 19/31/1189 07:40 AM  
 MCV 87.0 11/29/2018 07:40 AM  
 ABS. NEUTROPHILS 26.0 (H) 12/19/2016 07:45 AM  
 Hemoglobin, POC 15.6 12/06/2016 11:15 AM  
 Hematocrit, POC 46 12/06/2016 11:15 AM  
 
Lab Results Component Value Date/Time  Sodium 139 11/29/2018 07:40 AM  
 Potassium 4.9 11/29/2018 07:40 AM  
 Chloride 105 11/29/2018 07:40 AM  
 CO2 27 11/29/2018 07:40 AM  
 Glucose 74 11/29/2018 07:40 AM  
 BUN 35 (H) 11/29/2018 07:40 AM  
 Creatinine 1.15 11/29/2018 07:40 AM  
 GFR est AA 56 (L) 11/29/2018 07:40 AM  
 GFR est non-AA 46 (L) 11/29/2018 07:40 AM  
 Calcium 7.4 (L) 11/29/2018 07:40 AM  
 Glucose (POC) 83 11/29/2018 11:18 AM  
 Creatinine, POC 1.2 10/28/2014 08:24 AM  
 
Lab Results Component Value Date/Time Bilirubin, total 0.8 04/03/2018 08:53 AM  
 ALT (SGPT) 25 04/03/2018 08:53 AM  
 AST (SGOT) 59 (H) 04/03/2018 08:53 AM  
 Alk. phosphatase 272 (H) 04/03/2018 08:53 AM  
 Protein, total 6.8 04/03/2018 08:53 AM  
 Albumin 3.0 (L) 04/03/2018 08:53 AM  
 Globulin 3.8 04/03/2018 08:53 AM  
 
 
 
Records reviewed and summarized above. Pathology report(s) reviewed above. Radiology report(s) reviewed above. Assessment/PLAN:  
 
1) Polycythemia vera with myelofibrosis on BMB at [facility]. Pt is seeing Keon Silver. Reviewed records and history with pt today. Pt is hydrea and tolerating this fine. Is not a surgical candidate for splenectomy due to heart issues. Needs heart valve surgery. Counts have been controlled on HU. We discussed other options such as Jakafi and pt states she has discussed this in the past with prior Heme/ONC. It is a possible treatment option and may decrease splenomegaly.  
/ however, considering CBC is ok now and pt needs surgery, would probably continue with present regimen. Long discussion with pt today about all options and she wants to continue on same plan for now with current Heme/ONC doctor. Advised we are here for any questions. 2) aortic valve stenosis needs heart surgery. Per cardio. 3) splenomegaly with discomfort due to PV/ MF. No a surgical candidate at present. 4) psychosocial. Support good per pt. Ambulatory and active. F/u here prn. Call if questions. I appreciate the opportunity to participate in MsJoey Sheree Hernandez care.  
 
Signed By: Deejay Childers,

## 2019-02-07 NOTE — LETTER
2/12/2019 5:06 PM 
 
Patient:  Stepehn Dillard YOB: 1940 Date of Visit: 2/7/2019 Dear No Recipients: Thank you for referring Ms. Stephen Dillard to me for evaluation/treatment. Below are the relevant portions of my assessment and plan of care. If you have questions, please do not hesitate to call me. I look forward to following Ms. Luna along with you. Sincerely, Prateek Montanez, DO

## 2019-02-13 NOTE — DISCHARGE INSTRUCTIONS
Ascites: Care Instructions  Your Care Instructions  Ascites (say \"uh-SY-teez\") is a buildup of extra fluid in the belly. It can cause your belly to swell. It can also make it hard for you to breathe. Many diseases can cause ascites. But most people who get it have a liver problem. When the liver gets damaged, it can cause fluid to back up from the liver or from blood vessels. Then this fluid builds up in the belly. Your doctor may take a sample of the fluid in your belly with a thin needle. The sample is then tested to help find out the cause of the ascites. Treatment may include medicine. It may also include changing the way you eat so you don't eat a lot of salt. If your ascites is very bad and hard to treat, your doctor may need to use a needle to take out the fluid. Follow-up care is a key part of your treatment and safety. Be sure to make and go to all appointments, and call your doctor if you are having problems. It's also a good idea to know your test results and keep a list of the medicines you take. How can you care for yourself at home? · Be safe with medicines. Take your medicines exactly as prescribed. Call your doctor if you have any problems with your medicine. You will get more details on the specific medicines your doctor prescribes. · Eat low-salt foods, and don't add salt to your food. If you eat a lot of salt, it's harder to get rid of the extra fluid. Salt is in many prepared foods. These include cisneros, canned foods, snack foods, sauces, and soups. Look for products that are low-sodium or have reduced salt. · Do not drink any alcohol until you are all better. Alcohol will damage the liver more. If your liver disease is caused by drinking alcohol, do not drink alcohol at all. Tell your doctor if you need help to quit. Counseling, support groups, and sometimes medicines can help you stay sober. · Talk to your doctor before you take any other medicines.  These include over-the-counter medicines, vitamins, and herbal products. · Make sure your doctor knows all the medicines you take. Some medicines, such as acetaminophen (Tylenol), can make liver problems worse. When should you call for help? Call 911 anytime you think you may need emergency care. For example, call if:    · You have trouble breathing.     · You vomit blood or what looks like coffee grounds.    Call your doctor now or seek immediate medical care if:    · You have new or worse belly pain.     · You have a fever.    Watch closely for changes in your health, and be sure to contact your doctor if:    · You have any problems.     · Your belly is getting bigger.     · You are gaining weight. Where can you learn more? Go to http://alex-harry.info/. Enter B970 in the search box to learn more about \"Ascites: Care Instructions. \"  Current as of: March 27, 2018  Content Version: 11.9  © 8179-8689 [employer]. Care instructions adapted under license by [employer] (which disclaims liability or warranty for this information). If you have questions about a medical condition or this instruction, always ask your healthcare professional. [place] [address] any warranty or liability for your use of this information. Leg and Ankle Edema: Care Instructions  Your Care Instructions  Swelling in the legs, ankles, and feet is called edema. It is common after you sit or stand for a while. Long plane flights or car rides often cause swelling in the legs and feet. You may also have swelling if you have to stand for long periods of time at your job. Problems with the veins in the legs (varicose veins) and changes in hormones can also cause swelling. Sometimes the swelling in the ankles and feet is caused by a more serious problem, such as heart failure, infection, blood clots, or liver or kidney disease. Follow-up care is a key part of your treatment and safety.  Be sure to make and go to all appointments, and call your doctor if you are having problems. It's also a good idea to know your test results and keep a list of the medicines you take. How can you care for yourself at home? · If your doctor gave you medicine, take it as prescribed. Call your doctor if you think you are having a problem with your medicine. · Whenever you are resting, raise your legs up. Try to keep the swollen area higher than the level of your heart. · Take breaks from standing or sitting in one position. ? Walk around to increase the blood flow in your lower legs. ? Move your feet and ankles often while you stand, or tighten and relax your leg muscles. · Wear support stockings. Put them on in the morning, before swelling gets worse. · Eat a balanced diet. Lose weight if you need to. · Limit the amount of salt (sodium) in your diet. Salt holds fluid in the body and may increase swelling. When should you call for help? Call 911 anytime you think you may need emergency care. For example, call if:    · You have symptoms of a blood clot in your lung (called a pulmonary embolism). These may include:  ? Sudden chest pain. ? Trouble breathing. ? Coughing up blood.    Call your doctor now or seek immediate medical care if:    · You have signs of a blood clot, such as:  ? Pain in your calf, back of the knee, thigh, or groin. ? Redness and swelling in your leg or groin.     · You have symptoms of infection, such as:  ? Increased pain, swelling, warmth, or redness. ? Red streaks or pus. ? A fever.    Watch closely for changes in your health, and be sure to contact your doctor if:    · Your swelling is getting worse.     · You have new or worsening pain in your legs.     · You do not get better as expected. Where can you learn more? Go to http://alex-harry.info/. Enter C343 in the search box to learn more about \"Leg and Ankle Edema: Care Instructions. \"  Current as of: September 23, 2018  Content Version: 11.9  © [id number] [employer]. Care instructions adapted under license by [employer] (which disclaims liability or warranty for this information). If you have questions about a medical condition or this instruction, always ask your healthcare professional. [address] any warranty or liability for your use of this information. Polycythemia: Care Instructions  Your Care Instructions    Polycythemia (say \"paw-jhonny-sy-THEE-nelida-uh) is an abnormal increase in red blood cells. It happens when the tissue inside your bones (bone marrow) makes too much blood. It also can occur if your blood does not have enough liquid, or plasma. This can make the number of red blood cells seem higher than normal. The extra red blood cells make your blood thicker than normal. This may raise your risk for blood clots that can cause heart attacks or strokes. Clots can form in the deep veins of the body, a condition called deep vein thrombosis. Or, a clot can travel through the blood to a lung (a pulmonary embolism). Your doctor may treat you by taking out some of your blood (phlebotomy). The process is like donating blood. Your doctor may even recommend that you donate blood. You may take pills to stop your body from making red blood cells. You also will get treatment for any other conditions that may cause your body to make too many red blood cells. Follow-up care is a key part of your treatment and safety. Be sure to make and go to all appointments, and call your doctor if you are having problems. It's also a good idea to know your test results and keep a list of the medicines you take. How can you care for yourself at home? · Be safe with medicines. Take your medicines exactly as prescribed. Call your doctor if you think you are having a problem with your medicine.   · Drink plenty of fluids, enough so that your urine is light yellow or clear like water, before and after you have blood removed. If you have kidney, heart, or liver disease and have to limit fluids, talk with your doctor before you increase the amount of fluids you drink. · Take it easy after you have had blood removed. Do not do vigorous exercise. · If your doctor recommends aspirin, take it exactly as prescribed. Call your doctor if you think you are having a problem with your medicine. · Do not smoke. Smoking increases the risk of blood clots and may reduce the amount of oxygen in your blood. If you need help quitting, talk to your doctor about stop-smoking programs and medicines. These can increase your chances of quitting for good. · Take an antihistamine, such as a nondrowsy one like loratadine (Claritin) or one that might make you sleepy like diphenhydramine (Benadryl), if your skin is itchy. Some people who have this condition have itching. · Wear medical alert jewelry that lists your clotting problem. You can buy this at most drugstores. When should you call for help? Call 911 anytime you think you may need emergency care. For example, call if:    · You have sudden chest pain and shortness of breath, or you cough up blood.     · You have symptoms of a stroke. These may include:  ? Sudden numbness, tingling, weakness, or loss of movement in your face, arm, or leg, especially on only one side of your body. ? Sudden vision changes. ? Sudden trouble speaking. ? Sudden confusion or trouble understanding simple statements. ? Sudden problems with walking or balance. ? A sudden, severe headache that is different from past headaches.     · You have symptoms of a heart attack. These may include:  ? Chest pain or pressure, or a strange feeling in the chest.  ? Sweating. ? Shortness of breath. ? Nausea or vomiting. ? Pain, pressure, or a strange feeling in the back, neck, jaw, or upper belly or in one or both shoulders or arms. ? Lightheadedness or sudden weakness.   ? A fast or irregular heartbeat. After you call 911, the  may tell you to chew 1 adult-strength or 2 to 4 low-dose aspirin. Wait for an ambulance. Do not try to drive yourself.    Call your doctor now or seek immediate medical care if:    · You have signs of a blood clot, such as:  ? Pain in your calf, back of knee, thigh, or groin. ? Redness and swelling in your leg or groin.    Watch closely for changes in your health, and be sure to contact your doctor if you have any problems. Where can you learn more? Go to http://alex-harry.info/. Enter F168 in the search box to learn more about \"Polycythemia: Care Instructions. \"  Current as of: May 6, 2018  Content Version: 11.9  © 9334-1966 [employer]. Care instructions adapted under license by [employer] (which disclaims liability or warranty for this information). If you have questions about a medical condition or this instruction, always ask your healthcare professional. [place] [address] any warranty or liability for your use of this information.

## 2019-02-13 NOTE — ED NOTES
I have reviewed discharge instructions with the patient. The patient verbalized understanding. Patient armband removed and shredded Patient son present when patient discharged

## 2019-02-13 NOTE — ED TRIAGE NOTES
Pt arrives to ED with c\o diarrhea x 3 days and swelling to right foot x 3 days, pt sts she tried elevation and ice to no avail, pt is able to make needs known speaking in complete sentences, pt in nad at this time

## 2019-02-13 NOTE — ED PROVIDER NOTES
EMERGENCY DEPARTMENT HISTORY AND PHYSICAL EXAM 
 
Date: 2019 Patient Name: Hellen Ruiz History of Presenting Illness Chief Complaint Patient presents with  Diarrhea  Foot Swelling History Provided By: Patient Chief Complaint: Right foot pain and swelling Duration: 3 days Timing:  Constant Location: Right foot Quality: Swollen Modifying Factors: Pt tried elevation, soaking, ice, and took Lasix without improvement. Associated Symptoms: diarrhea Additional History (Context):  
3:21 AM 
Hellen Ruiz is a 70 y.o. female with PMHx of CAD, DVT, splenomegaly, and polycythemia vera who presents to the emergency department C/O right foot pain and swelling, onset 3 days ago. Pt tried elevation, soaking, ice, and took Lasix without improvement. Associated sxs include diarrhea. Has FHx of polycythemia vera in father. Pt denies abd pain, CP, or any other sxs or complaints. PCP: Dinora Barnard MD 
 
Current Facility-Administered Medications Medication Dose Route Frequency Provider Last Rate Last Dose  ciprofloxacin HCl (CIPRO) tablet 500 mg  500 mg Oral NOW Sea Mancuso MD      
 
Current Outpatient Medications Medication Sig Dispense Refill  furosemide (LASIX) 20 mg tablet Take 1 Tab by mouth daily. 30 Tab 0  
 HYDROcodone-acetaminophen (NORCO) 5-325 mg per tablet Take 1 tablets PO every 4-6 hours as needed for pain control. If over the counter ibuprofen or acetaminophen was suggested, then only take the vicodin for pain not well controlled with the over the counter medication. 12 Tab 0  
 ciprofloxacin HCl (CIPRO) 500 mg tablet Take 1 Tab by mouth two (2) times a day for 10 days. 20 Tab 0  
 hydroxyurea (HYDREA) 500 mg capsule Take 500 mg by mouth three (3) times daily.  spironolactone (ALDACTONE) 100 mg tablet Take  by mouth daily.  aspirin 81 mg chewable tablet Take 81 mg by mouth daily. Past History Past Medical History: 
Past Medical History:  
Diagnosis Date  Aortic stenosis  Arrhythmia  Breast CA ([facility]  CAD (coronary artery disease)  Cancer ([facility] [facility] breast, rt  
 Cellulitis of left leg 06/28/2016  
 pt denies  Ill-defined condition   
 polocythemia vera  Liver disease   
 swollen liver-swollen sleen-hernia 2017  Thromboembolus ([facility] [address] 06/2016 DVT leg leg Past Surgical History: 
Past Surgical History:  
Procedure Laterality Date [address] [place] [address] UNLISTED  BREAST SURGERY PROCEDURE UNLISTED  2005  
 part. rt.mastectomy  CARDIAC SURG PROCEDURE UNLIST 2x bypass 20 years ago  CHEST SURGERY PROCEDURE UNLISTED [address]  HX HEENT    
 cataracts Family History: 
Family History Problem Relation Age of Onset  Migraines Maternal Grandmother  Cancer Mother  Cancer Father Social History: 
Social History Tobacco Use  Smoking status: Never Smoker  Smokeless tobacco: Never Used Substance Use Topics  Alcohol use: Yes Alcohol/week: 0.5 oz Types: 1 Glasses of wine per week Frequency: Never Comment: 2 glasses per month  Drug use: No  
 
 
Allergies: 
No Known Allergies Review of Systems Review of Systems Constitutional: Negative for chills, diaphoresis, fever and unexpected weight change. HENT: Negative for congestion, drooling, ear pain, rhinorrhea, sore throat, tinnitus and trouble swallowing. Eyes: Negative for photophobia, pain, redness and visual disturbance. Respiratory: Negative for cough, choking, chest tightness, shortness of breath, wheezing and stridor. Cardiovascular: Negative for chest pain, palpitations and leg swelling. Gastrointestinal: Negative for abdominal distention, abdominal pain, anal bleeding, blood in stool, constipation, diarrhea, nausea and vomiting.   
Genitourinary: Negative for difficulty urinating, dysuria, flank pain, frequency, hematuria and urgency. Musculoskeletal: Positive for myalgias (right foot). Negative for arthralgias, back pain and neck pain. (+) right foot swelling Skin: Negative for color change, rash and wound. Neurological: Negative for dizziness, seizures, syncope, speech difficulty, light-headedness and headaches. Hematological: Does not bruise/bleed easily. Psychiatric/Behavioral: Negative for agitation, behavioral problems, hallucinations, self-injury and suicidal ideas. The patient is not hyperactive. Physical Exam  
 
Vitals:  
 02/13/19 0710 02/13/19 5768 02/13/19 0730 02/13/19 1445 BP: 111/57  108/59 Pulse:      
Resp:  16 Temp:      
SpO2:   99% 99% Weight:      
Height:      
 
Physical Exam  
Constitutional: She is oriented to person, place, and time. She appears well-developed and well-nourished. No distress. Uncomfortable appearing, non-toxic HENT:  
Head: Normocephalic and atraumatic. Right Ear: External ear normal.  
Left Ear: External ear normal.  
Mouth/Throat: Oropharynx is clear and moist. No oropharyngeal exudate. Eyes: Conjunctivae and EOM are normal. Pupils are equal, round, and reactive to light. No scleral icterus. No pallor Neck: Normal range of motion. Neck supple. No JVD present. No tracheal deviation present. No thyromegaly present. Cardiovascular: Normal rate, regular rhythm and normal heart sounds. Pulmonary/Chest: Effort normal and breath sounds normal. No stridor. No respiratory distress. Abdominal: Soft. Bowel sounds are normal. There is no tenderness. There is no rebound and no guarding. Protuberant Musculoskeletal: Normal range of motion. She exhibits no edema. Right foot: There is tenderness and swelling. No soft tissue injuries. Right foot with edema and diffuse tenderness to the mid and forefoot. Little involvement of the ankle. Lymphadenopathy:  
  She has no cervical adenopathy. Neurological: She is alert and oriented to person, place, and time. She has normal reflexes. No cranial nerve deficit. Coordination normal.  
Skin: Skin is warm and dry. No rash noted. She is not diaphoretic. No erythema. Psychiatric: She has a normal mood and affect. Her behavior is normal. Judgment and thought content normal.  
Nursing note and vitals reviewed. Diagnostic Study Results Labs - Recent Results (from the past 12 hour(s)) CBC WITH AUTOMATED DIFF Collection Time: 02/13/19  4:26 AM  
Result Value Ref Range WBC 19.0 (H) 4.6 - 13.2 K/uL  
 RBC 3.63 (L) 4.20 - 5.30 M/uL HGB 9.0 (L) 12.0 - 16.0 g/dL HCT 30.4 (L) 35.0 - 45.0 % MCV 83.7 74.0 - 97.0 FL  
 MCH 24.8 24.0 - 34.0 PG  
 MCHC 29.6 (L) 31.0 - 37.0 g/dL RDW 24.2 (H) 11.6 - 14.5 % PLATELET 100 855 - 237 K/uL NEUTROPHILS 76 (H) 42 - 75 % BAND NEUTROPHILS 11 (H) 0 - 5 % LYMPHOCYTES 9 (L) 20 - 51 % MONOCYTES 1 (L) 2 - 9 % EOSINOPHILS 1 0 - 5 % BASOPHILS 2 0 - 3 %  
 ABS. NEUTROPHILS 14.4 (H) 1.8 - 8.0 K/UL  
 ABS. LYMPHOCYTES 1.7 0.8 - 3.5 K/UL  
 ABS. MONOCYTES 0.2 0 - 1.0 K/UL  
 ABS. EOSINOPHILS 0.2 0.0 - 0.4 K/UL  
 ABS. BASOPHILS 0.4 (H) 0.0 - 0.1 K/UL PLATELET COMMENTS LARGE PLATELETS    
 RBC COMMENTS ANISOCYTOSIS 2+ 
    
 RBC COMMENTS POLYCHROMASIA SLIGHT 
HYPOCHROMIA 1+ 
    
 RBC COMMENTS TEARDROP CELLS 1+ RBC COMMENTS OVALOCYTES 1+ RBC COMMENTS SPHEROCYTES 
FEW 
SCHISTOCYTES 
OCCASSIONAL 
    
 WBC COMMENTS TOXIC GRANULATION    
 DF MANUAL METABOLIC PANEL, BASIC Collection Time: 02/13/19  4:26 AM  
Result Value Ref Range Sodium 138 136 - 145 mmol/L Potassium 4.5 3.5 - 5.5 mmol/L Chloride 103 100 - 108 mmol/L  
 CO2 27 21 - 32 mmol/L Anion gap 8 3.0 - 18 mmol/L Glucose 93 74 - 99 mg/dL BUN 24 (H) 7.0 - 18 MG/DL Creatinine 1.09 0.6 - 1.3 MG/DL  
 BUN/Creatinine ratio 22 (H) 12 - 20 GFR est AA 59 (L) >60 ml/min/1.73m2 GFR est non-AA 49 (L) >60 ml/min/1.73m2 Calcium 8.3 (L) 8.5 - 10.1 MG/DL MAGNESIUM Collection Time: 02/13/19  4:26 AM  
Result Value Ref Range Magnesium 2.2 1.6 - 2.6 mg/dL CK Collection Time: 02/13/19  4:26 AM  
Result Value Ref Range CK 18 (L) 26 - 192 U/L  
D DIMER Collection Time: 02/13/19  4:26 AM  
Result Value Ref Range D DIMER 6.49 (H) <0.46 ug/ml(FEU) Radiologic Studies -   
XR FOOT RT MIN 3 V    (Results Pending) RADIOLOGY FINDINGS Right foot X-ray shows osteopenia and minor arthritic changes, but otherwise no acute process. Pending review by Radiologist 
Recorded by Josh Loredo, ED Scribe, as dictated by Lord Driscoll. Prosper Romano MD 
 
 
Medical Decision Making I am the first provider for this patient. I reviewed the vital signs, available nursing notes, past medical history, past surgical history, family history and social history. Vital Signs-Reviewed the patient's vital signs. Pulse Oximetry Analysis - 100% on room air Records Reviewed: Nursing Notes and Old Medical Records Provider Notes (Medical Decision Making):  
Ddx: Edema is possible. Septic infection but unlikely gout followed by edema from heart failure or renal disease however, her findings are localized therefore unlikely heart or renal disease. Would also consider DVT. Will begin with d-dimer and bedside US and further DVT study as needed. Procedures: 
Procedures MEDICATIONS GIVEN: 
Medications  
ciprofloxacin HCl (CIPRO) tablet 500 mg (not administered) morphine injection 4 mg (4 mg IntraVENous Given 2/13/19 0430) ondansetron (ZOFRAN) injection 4 mg (4 mg IntraVENous Given 2/13/19 0430) diphenoxylate-atropine (LOMOTIL) tablet 2 Tab (2 Tabs Oral Given 2/13/19 0432) ED Course:  
3:21 AM  
Initial assessment performed.  The patients presenting problems have been discussed, and they are in agreement with the care plan formulated and outlined with them. I have encouraged them to ask questions as they arise throughout their visit. SIGN OUT: 
7:15 AM 
Patient's presentation, labs/imaging and plan of care was reviewed with Wood Dow MD as part of sign out. They will await Duplex Doppler and reassessment as part of the plan discussed with the patient. Wood Dow MD's assistance in completion of this plan is greatly appreciated but it should be noted that I will be the provider of record for this patient. Written by Jose Mcnulty ED Scribe, as dictated by Janelle Lane. Moi Salinas MD. Diagnosis and Disposition DISCHARGE NOTE: 
8:35 AM 
Miryam Pal  results have been reviewed with her. She has been counseled regarding her diagnosis, treatment, and plan. She verbally conveys understanding and agreement of the signs, symptoms, diagnosis, treatment and prognosis and additionally agrees to follow up as discussed. She also agrees with the care-plan and conveys that all of her questions have been answered. I have also provided discharge instructions for her that include: educational information regarding their diagnosis and treatment, and list of reasons why they would want to return to the ED prior to their follow-up appointment, should her condition change. She has been provided with education for proper emergency department utilization. CLINICAL IMPRESSION: 
 
1. Edema of right foot 2. Polycythemia vera ([address] 3. Other ascites 4. Splenomegaly 5. Cellulitis of foot PLAN: 
1. D/C Home 2. Current Discharge Medication List  
  
START taking these medications Details HYDROcodone-acetaminophen (NORCO) 5-325 mg per tablet Take 1 tablets PO every 4-6 hours as needed for pain control. If over the counter ibuprofen or acetaminophen was suggested, then only take the vicodin for pain not well controlled with the over the counter medication. Qty: 12 Tab, Refills: 0 Associated Diagnoses: Edema of right foot; Other ascites  
  
ciprofloxacin HCl (CIPRO) 500 mg tablet Take 1 Tab by mouth two (2) times a day for 10 days. Qty: 20 Tab, Refills: 0 CONTINUE these medications which have NOT CHANGED Details  
furosemide (LASIX) 20 mg tablet Take 1 Tab by mouth daily. Qty: 30 Tab, Refills: 0  
  
hydroxyurea (HYDREA) 500 mg capsule Take 500 mg by mouth three (3) times daily. spironolactone (ALDACTONE) 100 mg tablet Take  by mouth daily. aspirin 81 mg chewable tablet Take 81 mg by mouth daily. 3.  
Follow-up Information Follow up With Specialties Details Why Contact Info Sun Merlos MD Internal Medicine Schedule an appointment as soon as possible for a visit For Primary Care Follow Up [address] [place] [address] 
[phone] [facility] EMERGENCY DEPT Emergency Medicine Go to If symptoms worsen, As needed [address] [place] [id number] 
[phone]  
  
 
_______________________________ Attestations: This note is prepared by Ralph Carrillo and Rafael Berman, acting as Scribe for Katerina. William Bermudez MD. Katerina. William Bermudez MD:  The scribe's documentation has been prepared under my direction and personally reviewed by me in its entirety. I confirm that the note above accurately reflects all work, treatment, procedures, and medical decision making performed by me. 
 
_______________________________

## 2019-03-17 PROBLEM — K74.60 CIRRHOSIS (HCC): Status: ACTIVE | Noted: 2019-01-01

## 2019-03-17 NOTE — ROUTINE PROCESS
TRANSFER - IN REPORT:    Verbal report received from [place] (name) on Kathryn Taveras  being received from [employer], RN  (unit) for routine progression of care      Report consisted of patients Situation, Background, Assessment and   Recommendations(SBAR). Information from the following report(s) SBAR, Kardex, ED Summary, Intake/Output, MAR, Recent Results and Med Rec Status was reviewed with the receiving nurse. Opportunity for questions and clarification was provided. Assessment completed upon patients arrival to unit and care assumed.

## 2019-03-17 NOTE — ED TRIAGE NOTES
Patient arrives with her son c/o abdominal pain and pressure. Patient with hx of \"swollen spleen, swollen liver\" and paracentesis previously. Patient reports difficulty taking breaths and reports several episodes of diarrhea.   Signed By: Mary Araya     March 17, 2019

## 2019-03-17 NOTE — PROGRESS NOTES
Problem: Falls - Risk of  Goal: *Absence of Falls  Document Maco Fall Risk and appropriate interventions in the flowsheet.   Outcome: Progressing Towards Goal  Fall Risk Interventions:

## 2019-03-17 NOTE — ED NOTES
TRANSFER - OUT REPORT:    Verbal report given to Milo RN(name) on Andreas Patient  being transferred to [facility](unit) for routine progression of care       Report consisted of patients Situation, Background, Assessment and   Recommendations(SBAR). Information from the following report(s) SBAR, Kardex, ED Summary, [facility] and Recent Results was reviewed with the receiving nurse. Lines:   Peripheral IV 03/17/19 Right Antecubital (Active)   Dressing Status Clean, dry, & intact 3/17/2019  4:40 PM   Dressing Type Transparent 3/17/2019  4:40 PM   Hub Color/Line Status Pink;Flushed 3/17/2019  4:40 PM        Opportunity for questions and clarification was provided.       Patient transported with:   [employer]

## 2019-03-17 NOTE — ED PROVIDER NOTES
EMERGENCY DEPARTMENT HISTORY AND PHYSICAL EXAM    Date: 3/17/2019  Patient Name: Cici Duenas    History of Presenting Illness     Chief Complaint   Patient presents with    Abdominal Pain         History Provided By: patient     Chief Complaint: abdominal pain   Duration: 2 Weeks  Timing: acute   Location: Abdomen  Quality: Pressure  Severity: 6 out of 10  Modifying Factors: Movement increases pain  Associated Symptoms: Abd bloating    Additional History (Context):   4:11 PM:  Cici Duenas is a 70 y.o. female with PMHX of aortic stenosis, arrhythmia, breast cancer, CAD, cellulitis of the left leg, polycythemia vera, liver disease, thromboembolus who presents to the emergency department C/O 7/10 abdominal pain onset 2 weeks and getting worse. Associated sxs include nausea and abd bloating. Patient reports that she has inflammation of her spleen as well as her liver due to her rare blood cancer. She reports that she is from being followed by Dr. Carrolyn Dakin and is doing a chemotherapy pill. She reports that she was seen by Dr. Anand Segal in the past and he did 1 paracentesis but states he did not need to see her any further. She states that her abdominal bloating has increased over the past 2 weeks. Pt denies chest pain, shortness of breath, vomiting, fever, diarrhea and any other sxs or complaints.      PCP: Rex Odonnell MD    Current Facility-Administered Medications   Medication Dose Route Frequency Provider Last Rate Last Dose    0.9% sodium chloride infusion 500 mL  500 mL IntraVENous CONTINUOUS Sai Ek, NP   Stopped at 03/17/19 1828    aspirin chewable tablet 81 mg  81 mg Oral DAILY Jonny Pelaez MD        hydroxyurea (HYDREA) chemo cap 500 mg  500 mg Oral DAILY Jonny Pelaez MD        spironolactone (ALDACTONE) tablet 100 mg  100 mg Oral DAILY Jonny Pelaez MD        furosemide (LASIX) tablet 40 mg  40 mg Oral DAILY Jonny Pelaez MD        acetaminophen (TYLENOL) tablet 650 mg  650 mg Oral Q6H PRN Nichole Brown MD        ondansetron [facility]) injection 4 mg  4 mg IntraVENous Q6H PRN Nichole Brown MD           Past History     Past Medical History:  Past Medical History:   Diagnosis Date    Aortic stenosis     Arrhythmia     [facility]     CAD (coronary artery disease)     [facility]     breast, rt    Cellulitis of left leg 06/28/2016    pt denies    Ill-defined condition     polocythemia vera    Liver disease     swollen liver-swollen sleen-hernia 2017    Thromboembolus ([facility] 06/2016    DVT leg leg       Past Surgical History:  Past Surgical History:   Procedure Laterality Date    ABDOMEN SURGERY PROC UNLISTED      BREAST SURGERY PROCEDURE UNLISTED  2005    part. rt.mastectomy    CARDIAC SURG PROCEDURE UNLIST      2x bypass 20 years ago    CHEST SURGERY PROCEDURE UNLISTED      HX CORONARY ARTERY BYPASS GRAFT  1996    HX HEENT      cataracts       Family History:  Family History   Problem Relation Age of Onset    Migraines Maternal Grandmother     Cancer Mother     Cancer Father        Social History:  Social History     Tobacco Use    Smoking status: Never Smoker    Smokeless tobacco: Never Used   Substance Use Topics    Alcohol use: Yes     Alcohol/week: 0.5 oz     Types: 1 Glasses of wine per week     Frequency: Never     Comment: 2 glasses per month    Drug use: No       Allergies:  No Known Allergies      Review of Systems   Review of Systems   Constitutional: Negative for chills and fever. HENT: Negative for trouble swallowing. Respiratory: Negative for chest tightness and shortness of breath. Cardiovascular: Negative for chest pain. Gastrointestinal: Positive for abdominal distention, abdominal pain and nausea. Negative for diarrhea and vomiting. Genitourinary: Negative for dysuria. Musculoskeletal: Positive for myalgias. Negative for neck stiffness. Skin: Negative for rash. Hematological: Bruises/bleeds easily.    All other systems reviewed and are negative. Physical Exam     Vitals:    03/17/19 1730 03/17/19 1801 03/17/19 1946 03/17/19 2244   BP: 128/75 122/78 135/71 140/72   Pulse:  88 95 95   Resp:  16 17 18   Temp:  98.1 °F (36.7 °C) 98 °F (36.7 °C) 98.5 °F (36.9 °C)   SpO2: 99% 100% 97% 96%   Weight:   79.4 kg (175 lb 0.7 oz)    Height:   5' 4\" (1.626 m)      Physical Exam   Constitutional: She is oriented to person, place, and time. Chronically ill-appearing, NAD, elderly   HENT:   Head: Normocephalic and atraumatic. Eyes: Conjunctivae are normal.   No yellowing of the eyes noted   Neck: Normal range of motion. Neck supple. Cardiovascular: Normal rate and regular rhythm. Pulmonary/Chest: Effort normal.   Decreased bilateral bases   Abdominal: She exhibits distension. There is no rebound and no guarding. Generalized tenderness all 4 quadrants, large umbilical hernia palpated soft and compressible, ascites noted   Musculoskeletal: Normal range of motion. She exhibits no edema. Neurological: She is alert and oriented to person, place, and time. Skin: Skin is warm and dry. Diagnostic Study Results     Labs -     Recent Results (from the past 12 hour(s))   CBC WITH AUTOMATED DIFF    Collection Time: 03/17/19  2:32 PM   Result Value Ref Range    WBC 33.1 (H) 4.6 - 13.2 K/uL    RBC 4.06 (L) 4.20 - 5.30 M/uL    HGB 9.5 (L) 12.0 - 16.0 g/dL    HCT 32.7 (L) 35.0 - 45.0 %    MCV 80.5 74.0 - 97.0 FL    MCH 23.4 (L) 24.0 - 34.0 PG    MCHC 29.1 (L) 31.0 - 37.0 g/dL    RDW 24.4 (H) 11.6 - 14.5 %    PLATELET 415 936 - 395 K/uL    NEUTROPHILS 90 (H) 42 - 75 %    LYMPHOCYTES 4 (L) 20 - 51 %    MONOCYTES 2 2 - 9 %    EOSINOPHILS 2 0 - 5 %    BASOPHILS 0 0 - 3 %    METAMYELOCYTES 2 (H) 0 %    ABS. NEUTROPHILS 29.8 (H) 1.8 - 8.0 K/UL    ABS. LYMPHOCYTES 1.3 0.8 - 3.5 K/UL    ABS. MONOCYTES 0.7 0 - 1.0 K/UL    ABS. EOSINOPHILS 0.7 (H) 0.0 - 0.4 K/UL    ABS.  BASOPHILS 0.0 0.0 - 0.1 K/UL    RBC COMMENTS ANISOCYTOSIS  3+ RBC COMMENTS SPHEROCYTES  2+        RBC COMMENTS TEARDROP CELLS  FEW  OVALOCYTES  FEW        DF MANUAL     METABOLIC PANEL, COMPREHENSIVE    Collection Time: 03/17/19  2:32 PM   Result Value Ref Range    Sodium 138 136 - 145 mmol/L    Potassium 4.6 3.5 - 5.5 mmol/L    Chloride 105 100 - 108 mmol/L    CO2 26 21 - 32 mmol/L    Anion gap 7 3.0 - 18 mmol/L    Glucose 88 74 - 99 mg/dL    BUN 28 (H) 7.0 - 18 MG/DL    Creatinine 1.21 0.6 - 1.3 MG/DL    BUN/Creatinine ratio 23 (H) 12 - 20      GFR est AA 52 (L) >60 ml/min/1.73m2    GFR est non-AA 43 (L) >60 ml/min/1.73m2    Calcium 8.2 (L) 8.5 - 10.1 MG/DL    Bilirubin, total 0.6 0.2 - 1.0 MG/DL    ALT (SGPT) 12 (L) 13 - 56 U/L    AST (SGOT) 29 15 - 37 U/L    Alk. phosphatase 234 (H) 45 - 117 U/L    Protein, total 7.0 6.4 - 8.2 g/dL    Albumin 3.1 (L) 3.4 - 5.0 g/dL    Globulin 3.9 2.0 - 4.0 g/dL    A-G Ratio 0.8 0.8 - 1.7     LIPASE    Collection Time: 03/17/19  2:32 PM   Result Value Ref Range    Lipase 89 73 - 393 U/L   AMMONIA    Collection Time: 03/17/19  2:32 PM   Result Value Ref Range    Ammonia 35 (H) 11 - 32 UMOL/L   PROTHROMBIN TIME + INR    Collection Time: 03/17/19  2:32 PM   Result Value Ref Range    Prothrombin time 14.7 11.5 - 15.2 sec    INR 1.2 0.8 - 1.2     PTT    Collection Time: 03/17/19  2:32 PM   Result Value Ref Range    aPTT 40.0 (H) 23.0 - 36.4 SEC       Radiologic Studies -   CT ABD PELV W CONT   Final Result   Addendum 1 of 1   Addendum: Digital Imaging and Communications in Medicine (DICOM) format    image   data are available to nonaffiliated external healthcare facilities or    entities   on a secure, media free, reciprocally searchable basis with patient   authorization for at least a 12-month period after this study. Final      US ABD COMP   Final Result   IMPRESSION:      1.  Echogenic liver consistent with underlying hepatocellular disease. No   evidence of mass. 2. Bilateral echogenic kidneys consistent with medical renal disease. 3. Ascites. 4. Splenomegaly. CT Results  (Last 48 hours)    None        CXR Results  (Last 48 hours)    None          Medications given in the ED-  Medications   0.9% sodium chloride infusion 500 mL (0 mL IntraVENous IV Completed 3/17/19 1828)   aspirin chewable tablet 81 mg (not administered)   hydroxyurea (HYDREA) chemo cap 500 mg (not administered)   spironolactone (ALDACTONE) tablet 100 mg (not administered)   furosemide (LASIX) tablet 40 mg (not administered)   acetaminophen (TYLENOL) tablet 650 mg (not administered)   ondansetron (ZOFRAN) injection 4 mg (not administered)   iopamidol (ISOVUE 300) 61 % contrast injection  mL (100 mL IntraVENous Given 3/17/19 7933)         Medical Decision Making   I am the first provider for this patient. I reviewed the vital signs, available nursing notes, past medical history, past surgical history, family history and social history. Vital Signs-Reviewed the patient's vital signs. Pulse Oximetry Analysis - 96% on room air      Records Reviewed: Nursing Notes and Old Medical Records    Provider Notes (Medical Decision Making):   DDx: ascites, obstruction,     Procedures:  Procedures    ED Course:   4:11 PM: Initial assessment performed. The patients presenting problems have been discussed, and they are in agreement with the care plan formulated and outlined with them. I have encouraged them to ask questions as they arise throughout their visit.     6:29 PM Discussed patient's history, exam, and available diagnostics results with Dr. Ashley Lopez (hepatology) who will consult on patient if admitted and if she is discharged can try to schedule Paracentesis tomorrow    6:35 PM: Discussed patient's history, exam, and available diagnostics results with Dr. Jazzy Michel who recommends patient is admitted and is agreeable to treatment plan     6:42 PM Discussed patient's history, exam, and available diagnostics results with Dr. Ivonne High (internal medicine) who will admit to medical     6:43 PM: She is updated on all results and need for admission she is agreeable and offering no questions or concerns    Diagnosis and Disposition     Discussion:   70year old female with increasing abdominal pain as well as distention. Patient has a significant history of liver disease with previous paracentesis. Labs show elevated white blood cell count but she is afebrile with no other signs of infection. CT of the abdomen shows cirrhosis and hepatosplenomegaly with portal hypertension as well as atrophic kidneys and colonic diverticulosis. Patient will be admitted to the hospital for paracentesis and hepatology consult tomorrow. She is agreeable treatment plan was stable during her stay and is offering no questions or concerns      Core Measures:  For Hospitalized Patients:    1. Hospitalization Decision Time:  The decision to hospitalize the patient was made by Williams Flores at 6:45 PM on 3/17/2019    2. Aspirin: Aspirin was not given because the patient did not present with a stroke at the time of their Emergency Department evaluation    6:44 PM  Patient is being admitted to the hospital by Dr. Kaylyn Gonzales. The results of their tests and reasons for their admission have been discussed with them and/or available family. They convey agreement and understanding for the need to be admitted and for their admission diagnosis. CONDITIONS ON ADMISSION:  Sepsis is not present at the time of admission. Deep Vein Thrombosis is not present at the time of admission. Thrombosis is not present at the time of admission. MRSA is not present at the time of admission. Wound infection is not present at the time of admission. Pressure Ulcer is not present at the time of admission. CLINICAL IMPRESSION:    1. Abdominal pain, generalized    2. Cirrhosis of liver with ascites, unspecified hepatic cirrhosis type (HCC)    3. Splenomegaly    4.  Polycythemia vera (HCC)    5. Leukocytosis, unspecified type    6. Dehydration    7. Elevated partial thromboplastin time (PTT)        CLINICAL IMPRESSION:    1. Abdominal pain, generalized    2. Cirrhosis of liver with ascites, unspecified hepatic cirrhosis type (HCC)    3. Splenomegaly    4. Polycythemia vera (HCC)    5. Leukocytosis, unspecified type    6. Dehydration    7. Elevated partial thromboplastin time (PTT)        PLAN:  1. Admit to Medical By Dr. Shena Valdovinos  3. Follow-up Information    None       _______________________________    Attestations: This note is prepared by Ciara Candelaria, acting as Scribe for Shaista Beck MD.    Shaista Beck MD:  The scribe's documentation has been prepared under my direction and personally reviewed by me in its entirety.   I confirm that the note above accurately reflects all work, treatment, procedures, and medical decision making performed by me.  _______________________________

## 2019-03-18 NOTE — PROGRESS NOTES
Problem: Falls - Risk of  Goal: *Absence of Falls  Document Maco Fall Risk and appropriate interventions in the flowsheet.   Outcome: Progressing Towards Goal  Fall Risk Interventions:  Mobility Interventions: Assess mobility with egress test         Medication Interventions: Patient to call before getting OOB, Teach patient to arise slowly

## 2019-03-18 NOTE — CONSULTS
[address] [facility], Leander ESCAMILLA, Carlitos Valladares, [place]       Lee Kuo, JOHNNY Yusuf, [facility]   Daisy Cox, TARIQ Gudino [facility] 136    at [address]    [address] [place] [address], [facility] Sid Cortez  22.    [phone]    FAX: [address] [place] [address], [address]    [phone]    FAX: [phone]       HEPATOLOGY CONSULT NOTE  The patient is well known to me and regularly cared for at [address] [address]. 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 She also has tight Aortic Stenosis and is scheduled for percutaneous valvuloplasty. She developed ascites in 7/2018. This was controlled with diuretics until recently. She came to ED because of several weeks of increasing abdominal swelling. I have reviewed the Emergency room note, Hospital admission note, Notes by all other physicians who have seen the patient during this hospitalization to date. I have reviewed the problem list and the reason for this hospitalization. I have reviewed the allergies and the medications the patient was taking at home prior to this hospitalization. She complains of abdominal distention, SOB and early satiety because of all of the ascites. There is no edema, fever, chills    ASSESSMENT AND PLAN:  Ascites   The etiology for this is not clear at this time. I do think she has cirrhosis. Ascites is due to either passive hepatic congestion from pulmonary HTN and TR or from hepatic vein obstruction duer to infiltration of liver with hematopoetic precerser cells or both  ECHO from 10/2018 demonstrated mild to moderate pulmonary HTN with but normal RV and mild to moderate TR.   It is also possible that she could have lymphoma as cuase of ascites since PVera is risk factor for that. Elastography demonstrated elevated liver stiffness which could be due to infiltration of liver with hematopoetic cells or passive congestion  US demonstrated no hepatic vein thrombosis     Continue step 1 diuretics. No reason to add IV albumin since Sna and Scr are both normal.    Will perform paracentesis this AM.    Will measure ascites SAAG, TP and cytology    Will proceed with hepatic vein pressure measurments  and transjugular liver biopsy later today or in AM.    Aortic stenosis  ECHO from 10/2018 showed tight aortic stenosis, which has caused her to develop moderate pulmonary HTN and moderate TR. She will need valvuloplasty  Will clarify that etiology of ascites is from heart so they can proceed with valvuloplasty sooner than later. 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 She has been treated with hydroxyurea. Now that marrow is fibrosed all hematopoesis has moved to spleen and this explains the splenomegaly. As spleen reaches maximum size hematopoesis then moves to liver. I suspect liver biopsy will show hematopoetic stem cells infiltrating the liver and causing hepatic vein outflow obstruction which is contributing to ascites. SYSTEM REVIEW:  Constitution systems: Negative for fever, chills, weight gain, weight loss. Eyes: Negative for visual changes. ENT: Negative for sore throat, painful swallowing. Respiratory: SOB. Cardiology: Negative for chest pain, palpitations. GI:  Abdominal swelling, eary satiety. : Negative for urinary frequency, dysuria, hematuria, nocturia. Skin: Negative for rash. Hematology: Negative for easy bruising, blood clots. Musculo-skelatal: Negative for back pain, muscle pain, weakness. Neurologic: Negative for headaches, dizziness, vertigo, memory problems not related to HE. Psychology: Negative for anxiety, depression. FAMILY HISTORY:  The father  of PVera, CVA. The mother  of stomach cancer. There is no family history of liver disease.       SOCIAL HISTORY:  The patient is . The patient has 1 child. The patient has never used tobacco products. The patient has never consumed significant amounts of alcohol. PHYSICAL EXAMINATION:  VS: per nursing note  General:  Ill appearing  Eyes:  Sclera anicteric. ENT:  No oral lesions. Thyroid normal.  Nodes:  No adenopathy. Skin:  No spider angiomata. No jaundice. Respiratory:  Lungs clear to auscultation. Cardiovascular:  Regular heart rate. Systolic murmur. Abdomen:  Distended with obvious ascites. Extremities:  No lower extremity edema. Neurologic:  Alert and oriented. Cranial nerves grossly intact. No asterixis. LABORATORY:  Results for Daniel Parr (MRN [id number]) as of 3/18/2019 07:08   Ref. Range 2019 04:26 3/17/2019 14:32 3/18/2019 04:36   WBC Latest Ref Range: 4.6 - 13.2 K/uL 19.0 (H) 33.1 (H) 28.0 (H)   HGB Latest Ref Range: 12.0 - 16.0 g/dL 9.0 (L) 9.5 (L) 9.0 (L)   PLATELET Latest Ref Range: 135 - 420 K/uL 194 283 220   INR Latest Ref Range: 0.8 - 1.2    1.2    Sodium Latest Ref Range: 136 - 145 mmol/L 138 138 139   Potassium Latest Ref Range: 3.5 - 5.5 mmol/L 4.5 4.6 4.8   Chloride Latest Ref Range: 100 - 108 mmol/L 103 105 105   CO2 Latest Ref Range: 21 - 32 mmol/L 27 26 27   Glucose Latest Ref Range: 74 - 99 mg/dL 93 88 77   BUN Latest Ref Range: 7.0 - 18 MG/DL 24 (H) 28 (H) 29 (H)   Creatinine Latest Ref Range: 0.6 - 1.3 MG/DL 1.09 1.21 1.22   Bilirubin, total Latest Ref Range: 0.2 - 1.0 MG/DL  0.6    Albumin Latest Ref Range: 3.4 - 5.0 g/dL  3.1 (L)    ALT (SGPT) Latest Ref Range: 13 - 56 U/L  12 (L)    AST Latest Ref Range: 15 - 37 U/L  29    Alk.  phosphatase Latest Ref Range: 45 - 117 U/L  234 (H)    Ammonia Latest Ref Range: 11 - 32 UMOL/L  35 (H)        RADIOLOGY:  CT scan abdomen with IV contrast. Changes consistent with cirrhosis. No liver mass lesions. No dilated bile ducts. Moderate ascites.       Linda Griffin MD  [address]  [facility], [address]  [facility] [place], [address]  12 [facility]

## 2019-03-18 NOTE — PROGRESS NOTES
1944 receieved patient from ER via wheelchair. Son at bedside. Patient alert and oriented x4. Patient complained of pressure pain in her abdomen, patient refused pain medication at this time. Bilateral Legs 3+ pitting edema and weeping. Patient mentioned that she had a MRSA infection last month, then clarified that it is not MRSA, it is Cellulitis of her legs. Removed  Contact isolation. Patient has DNR and Power of  but it is not on file, advised patients son to bring the documents tomorrow morning. Patients is aware that while we don't have the documents, she will be treated as full code, patient verbalized understanding. 2300 1200 patient kept NPO       Call bell within reach. Patient walked to bathroom without any difficulty

## 2019-03-18 NOTE — PROGRESS NOTES
INITIAL NUTRITION ASSESSMENT     RECOMMENDATIONS/PLAN:   Diet: Adv diet per MD  Avoid prolonged NPO diet order as it does not meet estimated needs  Adult Malnutrition Criteria:      Nutrition assessment was completed by RDN and the patient was found to meet the following malnutrition criteria established by ASPEN/AND:    Adult Malnutrition Guidelines:  SEVERE PROTEIN CALORIE MALNUTRITION IN THE CONTEXT OF CHRONIC ILLNESS  Body Fat:  Severe Depletion  Muscle Mass: Severe Depletion  Fluid Accumulation: Severe. Inocencia Valle  03/18/19    REASON FOR ASSESSMENT:     []  RN Referral:    [x] MST score >/=2  Malnutrition Screening Tool (MST):  Recently Lost Weight Without Trying: Yes  If Yes, How Much Weight Loss: 24 - 33 lbs     MST Score: 3      NUTRITION ASSESSMENT:   Client History: 70 yrs old Female admitted with severe ascites,  Cirrhosis of liver, polycythemia vera. Pt had paracentesis this morning    PMHx: Includes cirrhosis of the liver, polycythemia vera, there is no alcohol use history. She sees Dr. Beatrice Castillo at [facility] for her PCV. She has coronary disease with two-vessel coronary bypass surgery in 1996. She has moderate aortic stenosis and is followed by Cardiology. She does have a history of DVT and is not currently on anticoagulation. Cultural/Temple Food Preferences: None Identified    FOOD/NUTRITION HISTORY  Diet History: pt reports eating sensibly. She does her own cooking, but goes out to eat with friends a lot. Usually eats 2x/day. NFPE shows temporal wasting, clavicle showing, and fluid in legs.     Food Allergies:  [x] NKFA      Pertinent PTA Medications: lasix,      NUTRITION INTAKE   Diet Order:  NPO      Average PO Intake:       Patient Vitals for the past 100 hrs:   % Diet Eaten   03/17/19 2012 100 %      Pertinent Medications:  [x] Reviewed; lasix,   Electrolyte Replacement Protocol: []K  []Mg  []PO4    Insulin:  [] SSI  [] Pre-meal   []  Basal   [] Drip  [] None  Pt expected to meet estimated nutrient needs through next review:          []  Yes     [x] No; NPO does not meet estimated needs ANTHROPOMETRICS  Height: 5' 4\" (162.6 cm)       Weight: 79.4 kg (175 lb 0.7 oz)    BMI: 30 kg/m^2  -  obese (30%-39.9% BMI)        Weight change: pt has noted severe ascites with a +19lbs since 9/19/18                                  Comparison to Reference Standards:  IBW: 120 lbs      %IBW: 146%      AdjBW: 60.8 kg    NUTRITION-FOCUSED PHYSICAL ASSESSMENT  Skin: No documentation in chart      GI: No BM    BIOCHEMICAL DATA & MEDICAL TESTS  Pertinent Labs:  [x] Reviewed; GFR est AA-52      NUTRITION PRESCRIPTION  Calories: 1868 kcal/day based on Villalba x 1.5  Protein: 33-44 g/day based on 0.6-0.8 g/kg of IBW  Fluid: 1868 ml/day based on 1 kcal/ml      NUTRITION DIAGNOSES:   1. Malnutrition related to inadequate oral intake as evidence by NFPE    NUTRITION INTERVENTIONS:   INTERVENTIONS:        GOALS:  1. Diet: Adv diet per MD 1. Adv diet per MD by next review 4 days     LEARNING NEEDS (Diet, Supplementation, Food/Nutrient-Drug Interaction):   [] None Identified  [] Inpatient education provided/documented    [] Identified and patient:  [] Declined     [x] Was not appropriate/indicated  NUTRITION MONITORING /EVALUATION:   Monitor wt  Monitor renal labs, electrolytes, fluid status    [] Participated in Interdisciplinary Rounds  [x] Interdisciplinary Care Plan Reviewed/Documented  DISCHARGE NUTRITION RECOMMENDATIONS ADDRESSED:        [x] To be determined closer to discharge    NUTRITION RISK:     [x]  At risk                     []  Not currently at risk     Will follow-up per policy.   Samy Ley 1

## 2019-03-18 NOTE — CDMP QUERY
Pt admitted with large volume ascites and cirrhosis. Please clarify if the patient is also being evaluated/ treated for:    Moderate Protein calorie malnutrition with Severe body fat and muscle depletion  Severe Protein calorie malnutrition with Severe body fat and muscle depletion  Malnutrition  with Severe body fat and muscle depletion  Other (please specify)  Unable to determine    The medical record reflects the following:  Risk Factors: Polycythemia vera on Hydrea, cirrhosis of liver, aortic stenosis      Clinical Indicators: Patient presented with large volume ascites due to liver disease associated with Polycythemia vera. A Nutrition assessment was completed by RDN and the patient was found to meet the following malnutrition criteria established by ASPEN/AND:   Adult Malnutrition Guidelines:  SEVERE PROTEIN CALORIE MALNUTRITION IN THE CONTEXT OF CHRONIC ILLNESS  Body Fat:  Severe Depletion   Muscle Mass: Severe Depletion  Fluid Accumulation: Severe.   NFPE shows temporal wasting, clavicle showing, and fluid in legs, noted severe ascites    Treatment: Nutrition consult, currently NPO, monitor I&O and weight    Thank you,   Antony Shrap RN, [address] [place] [address]  [phone]

## 2019-03-18 NOTE — PROGRESS NOTES
Hospitalist Progress Note    Patient: Andreas Patient MRN: [id number]  CSN: [id number]    YOB: 1940  Age: 70 y.o. Sex: female    DOA: 3/17/2019 LOS:  LOS: 1 day          Chief Complaint:  ascites        Assessment/Plan   Large volume paracentesis performed for ascites-15 liters today-albnumin ordered per IR  Cirrhosis  Aortic stenosis  Splenomegaly  Polycythemia vera  Mod prot marco antonio malnutrition    Resume PO diuretics  Resume reg diet  Albumin infusion as per IR  Appreciate hepatology consult  TIPS to be scheduled as outpatient    D./c in am if ok with Dr Kimberly Herron, repeat labs in am also due to large para done today    Disposition :  Patient Active Problem List   Diagnosis Code    Polycythemia vera ([facility] [address] D45    Coronary artery disease involving native coronary artery I25.10    Phlebitis and thrombophlebitis of femoral vein (deep) (superficial) (HCC) I80.10    Ascites R18.8    Aortic stenosis I35.0    Splenomegaly R16.1    Cirrhosis ([facility] [address] K74.60       Subjective:    I feel better after my procedure  Less abd pain  No N/V  No SOB    Review of systems:    Constitutional: denies fevers, chills, myalgias  Respiratory: denies  cough  Cardiovascular: denies  palpitations  Gastrointestinal: denies nausea, vomiting, diarrhea      Vital signs/Intake and Output:  Visit Vitals  /61 (BP 1 Location: Left arm, BP Patient Position: At rest)   Pulse 94   Temp 97.7 °F (36.5 °C)   Resp 17   Ht 5' 4\" (1.626 m)   Wt 79.4 kg (175 lb 0.7 oz)   SpO2 98%   Breastfeeding? No   BMI 30.05 kg/m²     Current Shift:  No intake/output data recorded. Last three shifts:  03/16 1901 - 03/18 0700  In: 740 [P.O.:240;  I.V.:500]  Out: 550 [Urine:550]    Exam:    General: elderly Wf, alert, NAD, OX3  Head/Neck: NCAT, supple, No masses, No lymphadenopathy  CVS:Regular rate and rhythm, no M/R/G, S1/S2 heard, no thrill  Lungs:Clear to auscultation bilaterally, no wheezes, rhonchi, or rales  Abdomen: Soft, markedly less tense distention, nl BS  Extremities: 2 plus edema LE BL  Neuro:grossly normal , follows commands  Psych:appropriate                Labs: Results:       Chemistry Recent Labs     03/18/19  0436 03/17/19  1432   GLU 77 88    138   K 4.8 4.6    105   CO2 27 26   BUN 29* 28*   CREA 1.22 1.21   CA 8.0* 8.2*   AGAP 7 7   BUCR 24* 23*   AP  --  234*   TP  --  7.0   ALB  --  3.1*   GLOB  --  3.9   AGRAT  --  0.8      CBC w/Diff Recent Labs     03/18/19  0436 03/17/19  1432   WBC 28.0* 33.1*   RBC 3.87* 4.06*   HGB 9.0* 9.5*   HCT 31.3* 32.7*    283   GRANS  --  90*   LYMPH  --  4*   EOS  --  2      Cardiac Enzymes No results for input(s): CPK, CKND1, ANNIE in the last 72 hours. No lab exists for component: CKRMB, TROIP   Coagulation Recent Labs     03/17/19  1432   PTP 14.7   INR 1.2   APTT 40.0*       Lipid Panel Lab Results   Component Value Date/Time    Cholesterol, total 116 11/29/2018 07:40 AM    HDL Cholesterol 35 (L) 11/29/2018 07:40 AM    LDL, calculated 67.2 11/29/2018 07:40 AM    VLDL, calculated 13.8 11/29/2018 07:40 AM    Triglyceride 69 11/29/2018 07:40 AM    CHOL/HDL Ratio 3.3 11/29/2018 07:40 AM      BNP No results for input(s): BNPP in the last 72 hours.    Liver Enzymes Recent Labs     03/17/19  1432   TP 7.0   ALB 3.1*   *   SGOT 29      Thyroid Studies No results found for: T4, T3U, TSH, TSHEXT     Procedures/imaging: see electronic medical records for all procedures/Xrays and details which were not copied into this note but were reviewed prior to creation of Blair Nieto MD

## 2019-03-18 NOTE — ROUTINE PROCESS
Bedside and Verbal shift change report given to Ann Chino RN (oncoming nurse) by Regulo Jack RN   (offgoing nurse). Report included the following information SBAR, ED Summary, Procedure Summary, Intake/Output, MAR, Accordion, Recent Results and Med Rec Status.

## 2019-03-18 NOTE — PROGRESS NOTES
8801 patient is schedule for paracentesis. Still on NPO. Requesting if she can have morphine after signing the consent and before the procedure starts. 5069 informed Dr Genet Arimjo. 6711 talked to DR Kyle Ybarra about the order for morphine. Dr Bogdan Pickens order 1mg dose of morphine. 3906 1 mg dose of morphine given before paracentesis. Luanne from angio inform this nurse that patient will not be able to have the biopsy tomorrow because of the aspirin, patient took it last Saturday and she needs to be off for 5 days. 8996 informed Dr Kyle Ybarra about the biopsy schedule. [address] from [facility] called patient has 3 doses of albumin and needs 1 more dose of albumin. 1020 patient back on the floor. Band aid dressing at the left abdomen clean and dry.

## 2019-03-18 NOTE — PROCEDURES
Vascular & Interventional Radiology Brief Procedure Note    Interventional Radiologist: Emely Bryant MD    Pre-operative Diagnosis:  ascites    Post-operative Diagnosis: Same as pre-op dx    Procedure(s) Performed:  paracentesis    Anesthesia:  Local and Moderate Sedation    Findings:  Uncomplicated RLQ paracentesis, final volume pending.      Complications: None    Estimated Blood Loss:  minimal    Tubes and Drains: None    Specimens: None    Condition: Good       Emely Bryant MD  [facility]  Vascular & Interventional Radiology  3/18/2019

## 2019-03-18 NOTE — ROUTINE PROCESS
RLQ paracentesis performed by Dr Thaddeus Bender. 15,600ml of fluid drained. Fluid brought to lab. Patient transported back to bed in stable condition.

## 2019-03-18 NOTE — PROGRESS NOTES
1500 bedside report received from Erick Marina RN. Patient resting quietly in bed. Adhesive bandage on RLQ of abdomen clean/dry/intact. Pain rated 2/10.     1520 nursing supervisor was called regarding chemotherapy drug for dual nurse verification. She stated if patient has been taking already, have any other RN dual verify 5 rights and we can give. 1800 patient did not want to take chemo medication (Hydrea) at this time. She said she will let the night nurse know when she is ready to take it. Night shift RN aware, med is in patient specific bin. Patient stated pain was 6/10, but she did not want Morphine. Patient preferred to use repositioning and distraction to ease pain. No other complaints the rest of shift, patient resting in bed.      Patient Vitals for the past 24 hrs:   Temp Pulse Resp BP SpO2   03/18/19 1609 98.6 °F (37 °C) 94 16 120/68 95 %   03/18/19 1233 98.1 °F (36.7 °C) 85 18 107/54 95 %   03/18/19 1133 97.9 °F (36.6 °C) 96 16 141/77 95 %   03/18/19 1020 98.1 °F (36.7 °C) 80 18 127/69 95 %   03/18/19 0750 98 °F (36.7 °C) 88 18 140/79 96 %   03/18/19 0341 97.7 °F (36.5 °C) 94 17 114/61 98 %   03/17/19 2244 98.5 °F (36.9 °C) 95 18 140/72 96 %   03/17/19 2012     97 %

## 2019-03-18 NOTE — H&P
The patient is an appropriate candidate to undergo para. Patient assessed immediately prior to induction. Anesthesia plan as follows: Local/No Anesthesia. History and Physical update:  H&P was reviewed and the patient was examined. No changes have occurred in the patient's condition since the H&P was completed.     Lexii Weiss MD  Vascular & Interventional Radiology  [facility]  3/18/2019

## 2019-03-18 NOTE — PROGRESS NOTES
Spoke with patient and Alton Weiss RN and informed them there is a 5 day hold on ASA for TJ Liver BX with pressures. Patient informed me that her last dose was on March 16th.    Diane Ambriz will speak with inform Dr. Ashley Brown of delay. IR notified.

## 2019-03-18 NOTE — PROGRESS NOTES
DC Plan: Discharge home with MD follow up once medically stable    0819  Chart reviewed. Pt admitted for ascites. Pt noted to have high RRAT score. CM will follow for transition of care needs. 1  Met with pt and pts son at bedside Son will drive her home t discharge. Pt states she lives alone. States she is independent. Pt drives. Denies using any DME. Pts PCP is MD Evelyn Elam. Pts oncologist is MD Bright Darnell. No immediate dc concerns identified. CM will cont to follow. Reason for Admission:    Per H&P, \"This is a 24-year-old  female with polycythemia vera, coronary disease and aortic stenosis as well as cirrhosis of the liver and with a history of DVT who came into the emergency room due to worsening abdominal distention over the past week. She has had one previous paracentesis performed on her last year, and there was consideration to do this a week or two ago but there was not enough fluid, she has been worsening over the past week with increasing abdominal distention and discomfort, worsening shortness of breath due to the abdominal distention and came into the emergency room today. \"    RRAT Score:     22 high             Resources/supports as identified by patient/family:   None identified                Top Challenges facing patient (as identified by patient/family and CM): Finances/Medication cost?                    Transportation?  self              Support system or lack thereof?  son                     Living arrangements? Lives alone           Self-care/ADLs/Cognition? Alert and oriented, independent          Current Advanced Directive/Advance Care Plan:  Not on file.  Provider, please consider palliative care/spiritual care consult to address ACP                          Plan for utilizing home health:  None, pt not home bound                      Likelihood of readmission:  HIGH                 Transition of Care Plan:   MD follow up             Care Management Interventions  PCP Verified by CM: Yes  Mode of Transport at Discharge:  Other (see comment)(son)  Transition of Care Consult (CM Consult): Discharge Planning  Current Support Network: Lives Alone  Confirm Follow Up Transport: Self  Plan discussed with Pt/Family/Caregiver: Yes  Discharge Location  Discharge Placement: Home

## 2019-03-18 NOTE — H&P
[facility] [place]  HISTORY AND PHYSICAL    Name:  Vickie Paz  MR#:   [id number]  :  1940  ACCOUNT #:  [de-identified]  ADMIT DATE:  2019    ADMISSION DIAGNOSES:  1. Severe ascites. 2.  Cirrhosis of the liver. 3.  Polycythemia vera. HISTORY OF PRESENT ILLNESS:  This is a 53-year-old  female with polycythemia vera, coronary disease and aortic stenosis as well as cirrhosis of the liver and with a history of DVT who came into the emergency room due to worsening abdominal distention over the past week. She has had one previous paracentesis performed on her last year, and there was consideration to do this a week or two ago but there was not enough fluid, she has been worsening over the past week with increasing abdominal distention and discomfort, worsening shortness of breath due to the abdominal distention and came into the emergency room today. PAST MEDICAL HISTORY:  Includes cirrhosis of the liver, polycythemia vera, there is no alcohol use history. She sees Dr. Seamus Samayoa at [facility] for her PCV. She has coronary disease with two-vessel coronary bypass surgery in . She has moderate aortic stenosis and is followed by Cardiology. She does have a history of DVT and is not currently on anticoagulation. FAMILY HISTORY:  She has a family history of polycythemia vera. Her other family history includes a sister who  of multiple sclerosis. SOCIAL HISTORY:  She is a , she is active in the community. There is no tobacco use. Occasional glass of wine, no regular alcohol use. No history of alcoholism. ALLERGIES:  SHE HAS NO MEDICATION ALLERGIES. MEDICATIONS AT HOME:  She is on Lasix, Aldactone, aspirin and Hydrea. PRIMARY CARE PHYSICIAN:  Her PCP is Dr. Aminata Gilbert. She sees Dr. Sophie Cavazos for her liver disease. REVIEW OF SYSTEMS:  GENERAL:  No fevers or chills.   RESPIRATORY:  Dyspnea on exertion due to abdominal distention but no wheezing or coughing. No upper respiratory infection symptoms. CARDIOVASCULAR: No chest pain, but she does have leg swelling. No palpitations. GENITOURINARY:  No dysuria or hematuria. GI:  Abdominal distention and discomfort but no nausea, vomiting. She does have diarrhea. She thinks related to the intraabdominal pressure and swelling. NEUROLOGIC:  No dizziness, lightheadedness or syncope. PHYSICAL EXAMINATION:  GENERAL:  On examination, she is awake and alert in no acute distress,  female who is pleasant, cooperative, oriented x3. VITAL SIGNS:  Temperature is 98.1, pulse 88, blood pressure 122/78, respiratory rate is 16, SAO2 is 100% on room air. HEENT:  Sclerae anicteric. Conjunctivae pink. NECK:  Her neck is supple. No thyromegaly or lymphadenopathy. LUNGS:  Lungs are clear bilaterally. No rales, rhonchi or wheezes. CARDIAC:  Exam systolic ejection murmur 3/6. No rub noted. ABDOMEN:  Severe distention up to her breast bone. She has a firm tense abdomen. This is all consistent with this large volume of ascites. There is no significant rebound or guarding. EXTREMITIES:  Lower extremities 3+ pitting edema to the midcalf bilaterally. Venous stasis changes in the lower extremities as well. LABORATORY DATA:  Her labs show an H and H of 9.5 and 32.7, white count is 33.1, platelets are 521. Her chemistry shows a BUN of 28, creatinine 1.21, ALT and AST at 12 and 29 respectively lipase 89. Ammonia 35, sodium, potassium chloride and bicarb are all within normal limits. CT scan of the abdomen and pelvis was performed that shows cirrhosis, hepatosplenomegaly, increased markedly interval of ascites with distention of the abdomen, there is diverticulosis noted. ASSESSMENT:  1. Severe ascites. 2.  Cirrhosis of the liver. 3.  Polycythemia vera. 4.  Aortic stenosis.     PLAN:  We will put her on a regular diet this evening as she is very hungry, then n.p.o. after midnight for paracentesis tomorrow, they have been in touch from the ER with Dr. Sophie Cavazos who all presume that he will be performing the procedure as per usual routine and we will keep her on her Lasix daily and spironolactone, I do not think we will do too much as she has been taking this at home and obviously her ascites has worsened. I suspect she may have a high volume paracentesis and then subsequently require albumin infusion thereafter. We will follow up a CBC, metabolic panel in the morning. She can have SCDs for DVT prophylaxis. She can be up ad camilla. She is being admitted to the medical unit. We will get further recommendations from hepatology and it sounds as if she has a cardiology appointment coming up with Dr. Ming Goss to discuss TAVR or valve replacement which she will require for her aortic stenosis and that is not a new issue for her, so with that said admission to the hospital for paracentesis, try to to help manage her ascites more effectively, discussion with her hepatologist about further recommendations moving forward and see if we can make her more comfortable with this hospital stay.       Keke Angel MD      [id number]  D:  03/17/2019 19:06  T:  03/17/2019 22:46  JOB #:  [id number]

## 2019-03-18 NOTE — PROGRESS NOTES
conducted a Follow up consultation and Spiritual Assessment for Amy Gallo, who is a 70 y.o.,female. Continued the relationship of care and support. Listened empathically. Offered prayer and assurance of continued prayer on patients behalf. Plan:  Chaplains will continue to follow and will provide pastoral care as needed or requested.  recommends bedside caregivers page the  on duty if patient shows signs of acute spiritual or emotional distress. Father FAISAL Ghotra.Div.   [address] [facility] - Office

## 2019-03-18 NOTE — PROGRESS NOTES
Bedside and Verbal shift change report given to NOÉ Kraus RN (oncoming nurse) by  Naya Lunsford RN (offgoing nurse). Report included the following information SBAR and Kardex.      Patient Vitals for the past 12 hrs:   Temp Pulse Resp BP SpO2   03/18/19 1233 98.1 °F (36.7 °C) 85 18 107/54 95 %   03/18/19 1133 97.9 °F (36.6 °C) 96 16 141/77 95 %   03/18/19 1020 98.1 °F (36.7 °C) 80 18 127/69 95 %   03/18/19 0750 98 °F (36.7 °C) 88 18 140/79 96 %   03/18/19 0341 97.7 °F (36.5 °C) 94 17 114/61 98 %

## 2019-03-19 NOTE — PROGRESS NOTES
Shift summary: Pt restless, unable to find comfortable position in bed due to abdominal pain. Offered morphine/ice, pt declined at this time. Abdomen soft. Puncture site to RLQ with bandaid dressing CDI from paracentesis. 0530- Pt sitting on side of bed, eating crackers. No complaints at this time.      Patient Vitals for the past 12 hrs:   Temp Pulse Resp BP SpO2   03/19/19 0341 98.3 °F (36.8 °C) 90 16 96/47 96 %   03/19/19 0014 98.9 °F (37.2 °C) 94 16 109/55 93 %   03/18/19 2037 98.8 °F (37.1 °C) 95 16 130/60 94 %

## 2019-03-19 NOTE — PROGRESS NOTES
16:26 Lab called to make nurse aware of error in [facility]. Reported WBC's values lower than actual. WBC count listed at 17.1 but actually 27.9.  Sylvain Velasco called to update nurse of this value. Dr. Boris Ivey pagemusa.

## 2019-03-19 NOTE — PROGRESS NOTES
Discharge instructions reviewed with the patient. Patient verbalized understanding. All questions answered. IV discontinued, no redness, swelling or pain noted. Patient awaiting son for transportation home, with an ETA of 15 minutes. Patient armband removed and shredded.

## 2019-03-19 NOTE — DISCHARGE INSTRUCTIONS
Patient Education     Ascites: Care Instructions  Your Care Instructions  Ascites (say \"uh-SY-teez\") is a buildup of extra fluid in the belly. It can cause your belly to swell. It can also make it hard for you to breathe. Many diseases can cause ascites. But most people who get it have a liver problem. When the liver gets damaged, it can cause fluid to back up from the liver or from blood vessels. Then this fluid builds up in the belly. Your doctor may take a sample of the fluid in your belly with a thin needle. The sample is then tested to help find out the cause of the ascites. Treatment may include medicine. It may also include changing the way you eat so you don't eat a lot of salt. If your ascites is very bad and hard to treat, your doctor may need to use a needle to take out the fluid. Follow-up care is a key part of your treatment and safety. Be sure to make and go to all appointments, and call your doctor if you are having problems. It's also a good idea to know your test results and keep a list of the medicines you take. How can you care for yourself at home? · Be safe with medicines. Take your medicines exactly as prescribed. Call your doctor if you have any problems with your medicine. You will get more details on the specific medicines your doctor prescribes. · Eat low-salt foods, and don't add salt to your food. If you eat a lot of salt, it's harder to get rid of the extra fluid. Salt is in many prepared foods. These include cisneros, canned foods, snack foods, sauces, and soups. Look for products that are low-sodium or have reduced salt. · Do not drink any alcohol until you are all better. Alcohol will damage the liver more. If your liver disease is caused by drinking alcohol, do not drink alcohol at all. Tell your doctor if you need help to quit. Counseling, support groups, and sometimes medicines can help you stay sober. · Talk to your doctor before you take any other medicines.  These include over-the-counter medicines, vitamins, and herbal products. · Make sure your doctor knows all the medicines you take. Some medicines, such as acetaminophen (Tylenol), can make liver problems worse. When should you call for help? Call 911 anytime you think you may need emergency care. For example, call if:    · You have trouble breathing.     · You vomit blood or what looks like coffee grounds.    Call your doctor now or seek immediate medical care if:    · You have new or worse belly pain.     · You have a fever.    Watch closely for changes in your health, and be sure to contact your doctor if:    · You have any problems.     · Your belly is getting bigger.     · You are gaining weight. Where can you learn more? Go to http://alex-harry.info/. Enter B970 in the search box to learn more about \"Ascites: Care Instructions. \"  Current as of: March 27, 2018  Content Version: 11.9  © 3694-4752 [employer]. Care instructions adapted under license by [employer] (which disclaims liability or warranty for this information). If you have questions about a medical condition or this instruction, always ask your healthcare professional. [address] any warranty or liability for your use of this information.

## 2019-03-19 NOTE — PROGRESS NOTES
PT aware of NPO status. PT aware of need to hold anticoagulants per protocol, holding asa since 3/17/19. PT aware of potential for sedation administration and need for  at discharge. PT aware of arrival time pre procedure, 0630. Pt states no questions at this time and has our number if any arise.

## 2019-03-19 NOTE — PROGRESS NOTES
9:00 am Pt alert and oriented, sitting in chair position in bed. Pt dressed and discharge orders put in, very happy to go home. Pt son called for transportation. D/C instructions given by Althea Christensen RN and IV removed.

## 2019-03-19 NOTE — DISCHARGE SUMMARY
Discharge Summary    Patient: Hellen Allenport MRN: [id number]  CSN: [id number]    YOB: 1940  Age: 70 y.o. Sex: female    DOA: 3/17/2019 LOS:  LOS: 2 days   Discharge Date:      Primary Care Provider:  Dinora Barnard MD    Admission Diagnoses: Cirrhosis [facility]) [K74.60]    Discharge Diagnoses:    Problem List as of 3/19/2019 Date Reviewed: 10/27/2018          Codes Class Noted - Resolved    Cirrhosis ([facility] ICD-10-CM: K74.60  ICD-9-CM: 571.5  3/17/2019 - Present        Splenomegaly ICD-10-CM: R16.1  ICD-9-CM: 789.2  10/10/2018 - Present        * (Principal) Ascites ICD-10-CM: R18.8  ICD-9-CM: 789.59  2018 - Present        Aortic stenosis ICD-10-CM: I35.0  ICD-9-CM: 424.1  2018 - Present        Phlebitis and thrombophlebitis of femoral vein (deep) (superficial) ([facility] ICD-10-CM: I80.10  ICD-9-CM: 451.11  2016 - Present        Polycythemia vera ([facility] ICD-10-CM: D45  ICD-9-CM: 238.4  2016 - Present        Coronary artery disease involving native coronary artery ICD-10-CM: I25.10  ICD-9-CM: 414.01  2016 - Present        RESOLVED: Left leg cellulitis ICD-10-CM: L03.116  ICD-9-CM: 682.6  2016 - 10/10/2018        RESOLVED: Cellulitis ICD-10-CM: L03.90  ICD-9-CM: 682.9  2016 - 10/10/2018              Discharge Medications:     Current Discharge Medication List      CONTINUE these medications which have CHANGED    Details   furosemide (LASIX) 40 mg tablet 1 tab po bid  Qty: 60 Tab, Refills: 0         CONTINUE these medications which have NOT CHANGED    Details   hydroxyurea (HYDREA) 500 mg capsule Take 500 mg by mouth three (3) times daily. spironolactone (ALDACTONE) 100 mg tablet Take  by mouth daily. HYDROcodone-acetaminophen (NORCO) 5-325 mg per tablet Take 1 tablets PO every 4-6 hours as needed for pain control.   If over the counter ibuprofen or acetaminophen was suggested, then only take the vicodin for pain not well controlled with the over the counter medication. Qty: 12 Tab, Refills: 0    Associated Diagnoses: Edema of right foot; Other ascites         STOP taking these medications       aspirin 81 mg chewable tablet Comments:   Reason for Stopping:               Discharge Condition: Good    Procedures : Paracentesis    Consults: Hepatology  PHYSICAL EXAM  Visit Vitals  /65 (BP 1 Location: Right arm, BP Patient Position: Sitting)   Pulse 87   Temp 98.4 °F (36.9 °C)   Resp 16   Ht 5' 4\" (1.626 m)   Wt 79.4 kg (175 lb 0.7 oz)   SpO2 97%   Breastfeeding? No   BMI 30.05 kg/m²     General: Awake, cooperative, no acute distress    HEENT: NC, Atraumatic. PERRLA, EOMI. Anicteric sclerae. Lungs:  CTA Bilaterally. No Wheezing/Rhonchi/Rales. Heart:  Regular  rhythm,  No murmur, No Rubs, No Gallops  Abdomen: BS+. Mild to mod ascites. Large spleen occuypying left side of abdomen. No tenderness   Extremities: Edema 1+ b/l  Psych:   Not anxious or agitated. Neurologic:  No acute neurological deficits. Admission HPI :     ADMISSION DIAGNOSES:  1. Severe ascites. 2.  Cirrhosis of the liver. 3.  Polycythemia vera.     HISTORY OF PRESENT ILLNESS:  This is a 68-year-old  female with polycythemia vera, coronary disease and aortic stenosis as well as cirrhosis of the liver and with a history of DVT who came into the emergency room due to worsening abdominal distention over the past week. She has had one previous paracentesis performed on her last year, and there was consideration to do this a week or two ago but there was not enough fluid, she has been worsening over the past week with increasing abdominal distention and discomfort, worsening shortness of breath due to the abdominal distention and came into the emergency room today.     PAST MEDICAL HISTORY:  Includes cirrhosis of the liver, polycythemia vera, there is no alcohol use history. She sees Dr. Isaiah French at [facility] for her PCV.   She has coronary disease with two-vessel coronary bypass surgery in 1996. She has moderate aortic stenosis and is followed by Cardiology. She does have a history of DVT and is not currently on anticoagulation. Hospital Course :     Admitted. Underwent large volume paracentesis (15 liters) on day 2 of hospital stay. Received albumin. Seen by Dr. Mickeal Aase of Hepatology. Improvement in ascites. Aspirin suspended in anticipation of TIPS later this week. Cleared for dc home this am on step 1 diuretics. To return this Friday for the TIPS. Activity: Activity as tolerated    Diet: Regular Diet    Follow-up: F/u with me next wek. Disposition: Home    Minutes spent on discharge: 45      Labs: Results:       Chemistry Recent Labs     03/19/19 0405 03/18/19  0436 03/17/19  1432   GLU 92 77 88   * 139 138   K 4.8 4.8 4.6    105 105   CO2 28 27 26   BUN 26* 29* 28*   CREA 1.16 1.22 1.21   CA 7.8* 8.0* 8.2*   AGAP 5 7 7   BUCR 22* 24* 23*   *  --  234*   TP 5.8*  --  7.0   ALB 3.0*  --  3.1*   GLOB 2.8  --  3.9   AGRAT 1.1  --  0.8      CBC w/Diff Recent Labs     03/19/19 0405 03/18/19 0436 03/17/19  1432   WBC 17.8* 28.0* 33.1*   RBC 3.93* 3.87* 4.06*   HGB 9.4* 9.0* 9.5*   HCT 31.5* 31.3* 32.7*    220 283   GRANS  --   --  90*   LYMPH  --   --  4*   EOS  --   --  2      Cardiac Enzymes No results for input(s): CPK, CKND1, ANNIE in the last 72 hours. No lab exists for component: CKRMB, TROIP   Coagulation Recent Labs     03/17/19  1432   PTP 14.7   INR 1.2   APTT 40.0*       Lipid Panel Lab Results   Component Value Date/Time    Cholesterol, total 116 11/29/2018 07:40 AM    HDL Cholesterol 35 (L) 11/29/2018 07:40 AM    LDL, calculated 67.2 11/29/2018 07:40 AM    VLDL, calculated 13.8 11/29/2018 07:40 AM    Triglyceride 69 11/29/2018 07:40 AM    CHOL/HDL Ratio 3.3 11/29/2018 07:40 AM      BNP No results for input(s): BNPP in the last 72 hours.    Liver Enzymes Recent Labs     03/19/19  2405 TP 5.8*   ALB 3.0*   *   SGOT 26      Thyroid Studies No results found for: T4, T3U, TSH, TSHEXT         Significant Diagnostic Studies: Ct Abd Pelv W Cont    Addendum Date: 3/17/2019    Addendum: Digital Imaging and Communications in Medicine (DICOM) format image data are available to nonaffiliated external healthcare facilities or entities on a secure, media free, reciprocally searchable basis with patient authorization for at least a 12-month period after this study. Result Date: 3/17/2019  EXAM: CT of the abdomen and pelvis INDICATION: Pain. COMPARISON: 4/24/2018. TECHNIQUE: Axial CT imaging of the abdomen and pelvis was performed without intravenous contrast. Evaluation of internal organs are limited due to lack of intravenous contrast.  Multiplanar reformats were generated. One or more dose reduction techniques were used on this CT: automated exposure control, adjustment of the mAs and/or kVp according to patient size, and iterative reconstruction techniques. The specific techniques used on this CT exam have been documented in the patient's electronic medical record. _______________ FINDINGS: LOWER CHEST: Lingula atelectasis. Stable right breast implant. LIVER, BILIARY: Heterogeneous enlarged liver with no evidence for mass. No biliary dilation. Gallbladder is unremarkable. PANCREAS: Atrophied and stable. SPLEEN: Enlarged measuring 22 cm in length. ADRENALS: Normal. KIDNEYS: Bilateral atrophic cystic kidneys. No renal stones. No ureteral stones. No hydronephrosis. No hydroureter. Urinary bladder is distended. No bladder stone. LYMPH NODES: No enlarged lymph nodes. GASTROINTESTINAL TRACT: No bowel dilation or wall thickening. Uncomplicated diverticula seen throughout colon. PELVIC ORGANS: Within normal limits. VASCULATURE: Aorta has a normal caliber. No periaortic collections. Mild atherosclerotic vascular atherosclerotic disease. Dilated splenic and portal veins.  BONES: No significant change of diffuse mild sclerotic osseous structures. Multilevel spinal degenerative changes. OTHER: No intraperitoneal free air. Significant ascites distending the abdomen. Umbilical hernia with ascites. Diffuse subcutaneous edema. _______________     IMPRESSION: 1. Findings suggestive of cirrhosis. No evidence of mass. Hepatosplenomegaly similar to previous exam.  Portal hypertension. 2. Interval marked increase of ascites with distention of the abdomen. 3. No significant change of mottled sclerosis of osseous structures. 4. Bilateral atrophic kidneys consistent with medical renal disease. 5. Colonic diverticulosis. Us Guide Paracentesis    Result Date: 3/18/2019  PROCEDURE: Ultrasound guided paracentesis COMPARISON: None CLINICAL Large ascites GUIDANCE: Ultrasound guidance was used to position (and confirm the position of) the needle / catheter. Image(s) saved in PACS: Ultrasound TECHNIQUE: Informed consent was obtained. Procedural time was performed. Limited ultrasound evaluation of the right lower quadrant was performed to localize a skin entry site. An image was recorded for the patient's file. Site was sterilely prepared and draped. With respect to ultrasound guidance, sterile gel and sterile probe covers were used. Local anesthetic with 1% buffered lidocaine. A 5 German dat needle was advanced into the peritoneal fluid. 15.6 liters of clear yellow fluid was aspirated. Sample sent as requested. Needle was withdrawn and hemostasis obtained with manual pressure. Sterile dressing was applied. Patient tolerated the procedure well, no immediate complications. IMPRESSION: Successful ultrasound-guided therapeutic and diagnostic paracentesis. Us Abd Comp    Result Date: 3/17/2019  EXAM: Abdominal ultrasound complete INDICATION: Pain. COMPARISON: 10/18/2018. _______________ FINDINGS: LIVER: The liver is within normal limits for size but echogenic. No focal mass.  Normal direction of blood flow in the portal vein. BILIARY SYSTEM: No intrahepatic biliary dilatation. Common bile duct measures 2.9 mm. GALLBLADDER: The gallbladder is mildly contracted. Gallbladder wall appears thickened likely due to ascites. No evidence for a positive Rome's sign. RIGHT KIDNEY: 9.6 cm in length. No hydronephrosis or renal mass. No visible calculi. Echogenic renal parenchyma. LEFT KIDNEY: 9.2 cm in length. No hydronephrosis or renal mass. No visible calculi. Echogenic renal parenchyma. PANCREAS: Not visualized. SPLEEN: Enlarged measuring 24 cm. IVC: Visualized portions are unremarkable in appearance. AORTA:  No aortic aneurysm is identified. The aorta has a normal caliber. OTHER: Diffuse ascites. _______________     IMPRESSION: 1.  Echogenic liver consistent with underlying hepatocellular disease. No evidence of mass. 2. Bilateral echogenic kidneys consistent with medical renal disease. 3. Ascites. 4. Splenomegaly. No results found for this or any previous visit.         CC: Jose L De Luna MD

## 2019-03-19 NOTE — ROUTINE PROCESS
Bedside and Verbal shift change report given to RAYO Parker (oncoming nurse) by Ryanne Herrera RN   (offgoing nurse). Report included the following information SBAR, Kardex, Procedure Summary, Intake/Output, MAR and Recent Results.

## 2019-03-19 NOTE — ROUTINE PROCESS
Bedside shift change report given to A. Lianne Burkitt (oncoming nurse) by Alfread Clark RN (offgoing nurse). Report included the following information SBAR, Kardex, Procedure Summary, Intake/Output and MAR.

## 2019-03-19 NOTE — PROGRESS NOTES
[address] [facility] [address], [place], [address], [place], [place]        April TARIQ Bustos PA-C Othella Dye, [facility] Dangelo NP        [facility] [place]    at [facility]    [address] [facility], [address] [address]  22. [phone]    FAX: [address] [place] [address]    at [address], [address]    [phone]    FAX: [phone]         HEPATOLOGY PROGRESS NOTE  The patient is well known to me and regularly cared for at [address] [address]. 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 She also has tight Aortic Stenosis and is scheduled for percutaneous valvuloplasty. She developed ascites in 7/2018. This was controlled with diuretics until recently.     She came to ED because of several weeks of increasing abdominal swelling. She underwent large volume paracentesis removing 15 liters yesterday. Cell count was negative for SBP    She feels much better. Unable to do hepatic venous preassure measurments and transjugular liver biopsy today because she was on ASA and only stopped this Saturday. She can go home today and come in for TJLX and HVP measurements on Thursday or Friday as out-patient. Continue step 1 diuretics for now at discharge. I will see her for follow-up in the office in 1 week.     ASSESSMENT AND PLAN:  Ascites   The etiology for this is not clear at this time. I do think she has cirrhosis. Ascites is due to either passive hepatic congestion from pulmonary HTN and TR or from hepatic vein obstruction duer to infiltration of liver with hematopoetic precerser cells or both  ECHO from 10/2018 demonstrated mild to moderate pulmonary HTN with but normal RV and mild to moderate TR.   It is also possible that she could have lymphoma as cuase of ascites since PVera is risk factor for that. Elastography demonstrated elevated liver stiffness which could be due to infiltration of liver with hematopoetic cells or passive congestion  US demonstrated no hepatic vein thrombosis     Continue step 1 diuretics. No reason to add IV albumin since Sna and Scr are both normal.     Will perform paracentesis this AM.    Will measure ascites SAAG, TP and cytology     Will proceed with hepatic vein pressure measurments  and transjugular liver biopsy later today or in AM.     Aortic stenosis  ECHO from 10/2018 showed tight aortic stenosis, which has caused her to develop moderate pulmonary HTN and moderate TR. She will need valvuloplasty  Will clarify that etiology of ascites is from heart so they can proceed with valvuloplasty sooner than later.     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 She has been treated with hydroxyurea. Now that marrow is fibrosed all hematopoesis has moved to spleen and this explains the splenomegaly. As spleen reaches maximum size hematopoesis then moves to liver. I suspect liver biopsy will show hematopoetic stem cells infiltrating the liver and causing hepatic vein outflow obstruction which is contributing to ascites.       PHYSICAL EXAMINATION:  VS: per nursing note  General:  Ill appearing  Eyes:  Sclera anicteric. ENT:  No oral lesions. Thyroid normal.  Nodes:  No adenopathy. Skin:  No spider angiomata. No jaundice. Respiratory:  Lungs clear to auscultation. Cardiovascular:  Regular heart rate. Systolic murmur. Abdomen:  Distended with obvious ascites. Extremities:  No lower extremity edema. Neurologic:  Alert and oriented. Cranial nerves grossly intact. No asterixis.     LABORATORY:  Results for Summer Nieves (MRN [id number]) as of 3/19/2019 07:33   Ref.  Range 3/17/2019 14:32 3/18/2019 04:36 3/19/2019 04:05   WBC Latest Ref Range: 4.6 - 13.2 K/uL 33.1 (H) 28.0 (H) 17.8 (H)   HGB Latest Ref Range: 12.0 - 16.0 g/dL 9.5 (L) 9.0 (L) 9.4 (L)   PLATELET Latest Ref Range: 135 - 420 K/uL 283 220 194   INR Latest Ref Range: 0.8 - 1.2   1.2     Sodium Latest Ref Range: 136 - 145 mmol/L 138 139 135 (L)   Potassium Latest Ref Range: 3.5 - 5.5 mmol/L 4.6 4.8 4.8   Chloride Latest Ref Range: 100 - 108 mmol/L 105 105 102   CO2 Latest Ref Range: 21 - 32 mmol/L 26 27 28   Glucose Latest Ref Range: 74 - 99 mg/dL 88 77 92   BUN Latest Ref Range: 7.0 - 18 MG/DL 28 (H) 29 (H) 26 (H)   Creatinine Latest Ref Range: 0.6 - 1.3 MG/DL 1.21 1.22 1.16   Bilirubin, total Latest Ref Range: 0.2 - 1.0 MG/DL 0.6  0.8   Albumin Latest Ref Range: 3.4 - 5.0 g/dL 3.1 (L)  3.0 (L)   ALT (SGPT) Latest Ref Range: 13 - 56 U/L 12 (L)  13   AST Latest Ref Range: 15 - 37 U/L 29  26   Alk.  phosphatase Latest Ref Range: 45 - 117 U/L 234 (H)  187 (H)   Ammonia Latest Ref Range: 11 - 32 UMOL/L 35 (H)         Carson Thompson MD  [address]  [address] [place] [address] 58, [address]  12 [facility]

## 2019-03-22 NOTE — PROCEDURES
Vascular & Interventional Radiology Brief Procedure Note    Interventional Radiologist: Deni Mcintosh MD    Pre-operative Diagnosis:  ascites    Post-operative Diagnosis: Same as pre-op dx    Procedure(s) Performed:  US para, TJ liver biopsy w pressures    Anesthesia:  Local and Moderate Sedation    Findings:  US para, RLQ, yielding 6.8Ls ascites fluid. Uncomplicated TJ liver bx/pressures, details in dictated report.      Complications: None    Estimated Blood Loss:  minimal    Tubes and Drains: None    Specimens: sent    Condition: Good       Deni Mcintosh MD  [facility]  Vascular & Interventional Radiology  3/22/2019

## 2019-03-22 NOTE — ROUTINE PROCESS
Ultrasound guided paracentesis performed by Dr Taylor Ca. Time out 0750. 6760 ml of fluid drawn. Fluid sent to lab. Patient tolerated procedure well and was transported by Saúl Shahid nurse, to the angio department for patient's next procedure. Patient in stable condition when leaving ultrasound department. rachana request denied

## 2019-03-22 NOTE — H&P
The patient is an appropriate candidate to undergo para and TJ liver biopsy. Patient assessed immediately prior to induction. Anesthesia plan as follows:   Conscious Sedation and Local/No Anesthesia. Planned agent(s):  fentanyl and versed    ASA Score:  ASA 3 - Severe systemic disease but compensated     History and Physical update:  H&P was reviewed and the patient was examined. No changes have occurred in the patient's condition since the H&P was completed.     Erick Thomas MD  Vascular & Interventional Radiology  [facility]  3/22/2019

## 2019-03-22 NOTE — PROCEDURES
The patient is an appropriate candidate to undergo TJ liver bx. Patient assessed immediately prior to induction. Anesthesia plan as follows:   Conscious Sedation. Planned agent(s):  fentanyl and versed    ASA Score:  ASA 2 - Mild systemic disease    History and Physical update:  H&P was reviewed and the patient was examined. No changes have occurred in the patient's condition since the H&P was completed.     Sb Christy MD  Vascular & Interventional Radiology  [facility]  3/22/2019

## 2019-03-22 NOTE — PROGRESS NOTES
Procedure note:  Pt arrived to [facility] w/2 separate CSN accounts for multiple procedures. A total of 2.5mg Versed and 125mcg Fentanyl administered during transjugular liver biopsy in angio. 1.5mg Versed and 75mcg Fentanyl wasted in angio sharps container witnessed by Иван Guerra angio tech.

## 2019-03-22 NOTE — PROGRESS NOTES
TRANSFER - OUT REPORT:    Verbal report given to CAMERON Echevarria(name) on Tung Hernandes  being transferred to care unit(unit) for routine progression of care       Report consisted of patients Situation, Background, Assessment and   Recommendations(SBAR). Information from the following report(s) SBAR, Kardex and MAR was reviewed with the receiving nurse. Lines:   Peripheral IV 03/22/19 Anterior;Distal;Right Forearm (Active)   Site Assessment Clean, dry, & intact 3/22/2019  6:58 AM   Phlebitis Assessment 0 3/22/2019  6:58 AM   Infiltration Assessment 0 3/22/2019  6:58 AM   Dressing Status Clean, dry, & intact 3/22/2019  6:58 AM   Dressing Type Transparent;Tape 3/22/2019  6:58 AM   Hub Color/Line Status Flushed;Blue;Capped 3/22/2019  6:58 AM   Action Taken Open ports on tubing capped 3/22/2019  6:58 AM   Alcohol Cap Used Yes 3/22/2019  6:58 AM        Opportunity for questions and clarification was provided.       Patient transported with:   Registered Nurse

## 2019-03-22 NOTE — PROGRESS NOTES
Pt is all prepped and ready for procedure. 1000 Pt back to care unit. Procedure tolerated well. Pt awake and alert. Tagaderm dressing to rt neck area dry and intact. 1200 Pt discharged home in the care of son in stable condition with no complaints.

## 2019-03-22 NOTE — DISCHARGE INSTRUCTIONS
Megan 36 Patient Education        Percutaneous Liver Biopsy: What to Expect at Home  Your Recovery  Percutaneous liver biopsy is a procedure to take a tiny sample (biopsy) of your liver tissue. Percutaneous (say \"per-kew-ANIBAL-nee-us) means \"through the skin. \" The procedure is also called aspiration biopsy or fine-needle aspiration. The tissue sample is looked at under a microscope. Your doctor can look for infection or other liver problems. You may have some pain where the biopsy needle entered your skin (the puncture site). You may also have pain in your shoulder. This is called referred pain. It is caused by pain traveling along a nerve near the biopsy site. The referred pain usually lasts less than 12 hours. You may have a small amount of bleeding from the puncture site. You will need to take it easy at home for 1 or 2 days after the procedure. You will probably be able to return to work and most of your usual activities after that. This care sheet gives you a general idea about how long it will take for you to recover. But each person recovers at a different pace. Follow the steps below to get better as quickly as possible. How can you care for yourself at home? Activity    · Rest when you feel tired. Getting enough sleep will help you recover.     · Try to walk each day. Start by walking a little more than you did the day before. Bit by bit, increase the amount you walk. Walking boosts blood flow and helps prevent pneumonia and constipation.     · Avoid exercises that use your belly muscles and strenuous activities, such as bicycle riding, jogging, weight lifting, or aerobic exercise, for 1 week or until your doctor says it is okay.     · Ask your doctor when you can drive again.     · You will probably need to take 1 or 2 days off from work.  It depends on the type of work you do and how you feel.     · You will probably be able to shower the same day as the test, if your doctor says it is okay. Pat the puncture site dry. Do not take a bath for at least 2 days after the test, or until your doctor tells you it is okay. Diet    · You can eat your normal diet. If your stomach is upset, try bland, low-fat foods like plain rice, broiled chicken, toast, and yogurt.     · Drink plenty of fluids (unless your doctor tells you not to). Medicines    · Your doctor will tell you if and when you can restart your medicines. He or she will also give you instructions about taking any new medicines.     · If you take blood thinners, such as warfarin (Coumadin), clopidogrel (Plavix), or aspirin, be sure to talk to your doctor. He or she will tell you if and when to start taking those medicines again. Make sure that you understand exactly what your doctor wants you to do.     · Be safe with medicines. Take pain medicines exactly as directed. ? If the doctor gave you a prescription medicine for pain, take it as prescribed. ? If you are not taking a prescription pain medicine, take an over-the-counter medicine that your doctor recommends. Read and follow all instructions on the label. ? Do not take aspirin, ibuprofen (Advil, Motrin), naproxen (Aleve), or other nonsteroidal anti-inflammatory drugs (NSAIDs) unless your doctor says it is okay.     · If you think your pain medicine is making you sick to your stomach:  ? Take your medicine after meals (unless your doctor has told you not to). ? Ask your doctor for a different kind of pain medicine.    Care of the puncture site    · Keep a bandage over the puncture site for the first 1 or 2 days. Follow-up care is a key part of your treatment and safety. Be sure to make and go to all appointments, and call your doctor if you are having problems. It's also a good idea to know your test results and keep a list of the medicines you take. When should you call for help? Call 911 anytime you think you may need emergency care.  For example, call if:    · You passed out (lost consciousness).     · You have severe trouble breathing.     · You have sudden chest pain and shortness of breath, or you cough up blood.     · You have severe pain in your chest, shoulder, or belly.    Call your doctor now or seek immediate medical care if:    · You have new or worse shortness of breath.     · Bright red blood has soaked through the bandage over the puncture site.     · You have pain that does not get better after you take your pain medicine.     · You are sick to your stomach or cannot keep fluids down.     · You have a fever, chills, or body aches.     · You have signs of infection, such as:  ? Increased pain, swelling, warmth, or redness. ? Red streaks leading from the puncture site. ? Pus draining from the puncture site. ? A fever.     · You have new or worse pain at the puncture site.     · You have new or worse belly swelling or bloating.     · You have trouble passing urine or stool.     · Your stools are black and tarlike or have streaks of blood.     · You have pale-colored stools along with dark urine and itching.    Watch closely for changes in your health, and be sure to contact your doctor if you have any problems. Where can you learn more? Go to http://alex-harry.info/. Enter C358 in the search box to learn more about \"Percutaneous Liver Biopsy: What to Expect at Home. \"  Current as of: June 25, 2018  Content Version: 11.9  © 7586-4987 [employer]. Care instructions adapted under license by [employer] (which disclaims liability or warranty for this information). If you have questions about a medical condition or this instruction, always ask your healthcare professional. [place] [address] any warranty or liability for your use of this information. [facility] DISCHARGE INSTRUCTIONS    General Information:  During this procedure, the doctor will insert a needle into the abdomen to drain fluid. After the procedure, you will be able to take a deep breath much easier. The site of the puncture may ooze the first day. This will decrease and eventually stop. Paracentesis (draining fluid from the abdomen) sometimes makes patients hypotensive (low blood pressure). Your doctor may order for you to receive fluids or albumin (a volume booster) during the procedure through an IV site. Home Care Instructions:  Keep the puncture site clean and dry. No tub baths or swimming until puncture site heals. Showering is acceptable. Resume your normal diet, and resume your normal activity slowly and as you tolerate. If you are short of breath, rest. If shortness of breath does not ease, please call your ordering doctor. Fluid can re-accumulate in the chest and/or in the abdomen. If this should occur, your doctor needs to know as you may need to have the procedure done again. Call If:     You should call your Physician and/or the Radiology Nurse if you notice any signs of infection, like pus draining, or if it is swollen or reddened. Also call if you have a fever, or if you are bleeding from the puncture site more than a small amount on the dressing. Call if the puncture site keeps draining fluid. Some oozing is to be expected, but should slow and then stop. Call if you feel like you have pressure in your abdomen. SEEK IMMEDIATE CARE OR CALL 911 IF YOU SUDDENLY HAVE TROUBLE BREATHING, OR IF YOUR LIPS TURN BLUE, OR IF YOU NOTICE BLOOD IN YOUR SPUTUM. Follow-Up Instructions: Please see your ordering doctor as he/she has requested.      To Reach Us:        Date: 3/22/2019  Discharging Nurse: Christopher Saldana RN        DISCHARGE SUMMARY from Nurse    PATIENT INSTRUCTIONS:    After general anesthesia or intravenous sedation, for 24 hours or while taking prescription Narcotics:  · Limit your activities  · Do not drive and operate hazardous machinery  · Do not make important personal or business decisions  · Do  not drink alcoholic beverages  · If you have not urinated within 8 hours after discharge, please contact your surgeon on call. Report the following to your surgeon:  · Excessive pain, swelling, redness or odor of or around the surgical area  · Temperature over 100.5  · Nausea and vomiting lasting longer than 4 hours or if unable to take medications  · Any signs of decreased circulation or nerve impairment to extremity: change in color, persistent  numbness, tingling, coldness or increase pain  · Any questions    What to do at Home:  Recommended activity: Activity as tolerated,       *  Please give a list of your current medications to your Primary Care Provider. *  Please update this list whenever your medications are discontinued, doses are      changed, or new medications (including over-the-counter products) are added. *  Please carry medication information at all times in case of emergency situations. These are general instructions for a healthy lifestyle:    No smoking/ No tobacco products/ Avoid exposure to second hand smoke  Surgeon General's Warning:  Quitting smoking now greatly reduces serious risk to your health. Obesity, smoking, and sedentary lifestyle greatly increases your risk for illness    A healthy diet, regular physical exercise & weight monitoring are important for maintaining a healthy lifestyle    You may be retaining fluid if you have a history of heart failure or if you experience any of the following symptoms:  Weight gain of 3 pounds or more overnight or 5 pounds in a week, increased swelling in our hands or feet or shortness of breath while lying flat in bed. Please call your doctor as soon as you notice any of these symptoms; do not wait until your next office visit.     Recognize signs and symptoms of STROKE:    F-face looks uneven    A-arms unable to move or move unevenly    S-speech slurred or non-existent    T-time-call 911 as soon as signs and symptoms begin-DO NOT go Back to bed or wait to see if you get better-TIME IS BRAIN. Warning Signs of HEART ATTACK     Call 911 if you have these symptoms:   Chest discomfort. Most heart attacks involve discomfort in the center of the chest that lasts more than a few minutes, or that goes away and comes back. It can feel like uncomfortable pressure, squeezing, fullness, or pain.  Discomfort in other areas of the upper body. Symptoms can include pain or discomfort in one or both arms, the back, neck, jaw, or stomach.  Shortness of breath with or without chest discomfort.  Other signs may include breaking out in a cold sweat, nausea, or lightheadedness. Don't wait more than five minutes to call 911 - MINUTES MATTER! Fast action can save your life. Calling 911 is almost always the fastest way to get lifesaving treatment. Emergency Medical Services staff can begin treatment when they arrive -- up to an hour sooner than if someone gets to the hospital by car. The discharge information has been reviewed with the patient. The patient verbalized understanding. Discharge medications reviewed with the patient and appropriate educational materials and side effects teaching were provided.       Patient armband removed and shredded    ___________________________________________________________________________________________________________________________________

## 2019-03-25 NOTE — TELEPHONE ENCOUNTER
----- Message from Katina Soler MD sent at 3/22/2019  6:42 AM EDT -----  DC from [facility] Wednesday. Schedule FU with MLS on Monday April 1.

## 2019-03-27 PROBLEM — D75.81 MYELOFIBROSIS (HCC): Status: ACTIVE | Noted: 2019-01-01

## 2019-03-27 PROBLEM — D75.89: Status: ACTIVE | Noted: 2019-01-01

## 2019-03-27 PROBLEM — K74.60 CIRRHOSIS (HCC): Status: RESOLVED | Noted: 2019-01-01 | Resolved: 2019-01-01

## 2019-03-27 NOTE — PROGRESS NOTES
[address] [facility] [address] [facility] [place] Yadira Ibrahim MD, Sisto Barrier, FAASLD Eder Pickens, JOHNNY Beaulieu, [facility]   Rico Ireland, TARIQ Majano, TARIQ Benavides [facility] [address] 
  at [address], [address] [place] [place]  22. 
  [phone] FAX: [address] 
  [facility] 
  [address], [address] [facility], [address] 
  [phone] FAX: [phone] Patient Care Team: 
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 The active problem list, all pertinent past medical history, medications, radiologic findings and laboratory findings related to the liver disorder were reviewed with the patient. The patient is a 70 y.o.  female who developed abdominal distention in 2017. 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 She developed ascites and underwent paracentesis in 7/2018. Cytology of the ascites was negative. There was no other analysis of the fluid such as cell count or albumin. Ascites has resolved with diuretics. An attempt at paracentesis since the last office appointment demonstrated only minimal ascites. The patient underwent a liver biopsy in 3/2019. The procedure was well tolerated. I have personally reviewed the liver biopsy slides. This demonstrates  Normal liver, no fibrosis, extensive extramedulary hematopoesis throughout sinusoids. The patient notes fatigue, swelling of the abdomen, The patient has not experienced pain in the right side over the liver, problems concentrating, swelling of the lower extremities, hematemesis, hematochezia. The patient has limitations in functional activities which can be attributed to the liver disease and to other medical problems that are not related to the liver disease. ASSESSMENT AND PLAN: 
Ascites The etiology for this extramedulary hematopoesis with extensive hematologic precursor cells throughout the hepatic sinusoids. The patient does not have cirrhosis ECHO demonstrated mild pulmonary HTN but normal RV and only mild TR. Elastography demonstrated elevated liver stiffness. This is due to filling the sinusoids with hematology precursors and causing hepatic congestion from physiology that is consistent with outflow obstruction. Continue step 1 diuretics. If she develops recurrent and refractory ascites this could be treated with TIPS. If she has replacement of aortic valve the ascites may resolve. Aortic stenosis ECHO showed tight aortic stenosis. This is likely the etiology for mild pulmonary HTN She will need valvuloplasty There is no contraindication to performing percutaneous aortic valve replacement. If fact this would be recommended since it is likely to improve forward cardiac output. Treatment of other medical problems in patients with chronic liver disease There are no contraindications for the patient to take any medications that are necessary for treatment of other medical issues. The patient does not consume alcohol daily. Normal doses of acetaminophen can be used for pain as needed. Normal doses of acetaminophen as recommended on the label are not hepatotoxic, even in patients with cirrhosis. Counseling for alcohol in patients with chronic liver disease The patient was counseled regarding alcohol consumption and the effect of alcohol on chronic liver disease. The patient does not consume any significant amount of alcohol. Vaccinations The need for vaccination against viral hepatitis A and B will be assessed with serologic and instituted as appropriate. Routine vaccinations against other bacterial and viral agents can be performed as indicated. Annual flu vaccination should be administered if indicated. ALLERGIES No Known Allergies MEDICATIONS Current Outpatient Medications Medication Sig  furosemide (LASIX) 40 mg tablet 1 tab po bid  hydroxyurea (HYDREA) 500 mg capsule Take 500 mg by mouth three (3) times daily.  spironolactone (ALDACTONE) 100 mg tablet Take  by mouth daily. No current facility-administered medications for this visit. SYSTEM REVIEW NOT RELATED TO LIVER DISEASE OR REVIEWED ABOVE: 
Constitution systems: Negative for fever, chills, weight gain, weight loss. Eyes: Negative for visual changes. ENT: Negative for sore throat, painful swallowing. Respiratory: Negative for cough, hemoptysis, SOB. Cardiology: Negative for chest pain, palpitations. GI:  Negative for constipation or diarrhea. : Negative for urinary frequency, dysuria, hematuria, nocturia. Skin: Negative for rash. Hematology: Negative for easy bruising, blood clots. Musculo-skelatal: Negative for back pain, muscle pain, weakness. Neurologic: Negative for headaches, dizziness, vertigo, memory problems not related to HE. Psychology: Negative for anxiety, depression. FAMILY HISTORY: 
The father  of PVera, CVA. The mother  of stomach cancer. There is no family history of liver disease. SOCIAL HISTORY: 
The patient is . The patient has 1 child. The patient has never used tobacco products. The patient has never consumed significant amounts of alcohol. PHYSICAL EXAMINATION: 
Visit Vitals /67 Pulse 83 Temp 98.6 °F (37 °C) (Tympanic) Ht 5' 5\" (1.651 m) Wt 149 lb 4 oz (67.7 kg) SpO2 98% BMI 24.84 kg/m² General: No acute distress. Eyes: Sclera anicteric. ENT: No oral lesions. Thyroid normal. 
Nodes: No adenopathy. Skin: No spider angiomata. No jaundice. No palmar erythema. Respiratory: Lungs clear to auscultation. Cardiovascular: Regular heart rate. No murmurs. No JVD. Abdomen: Distended. Some ascites appears the be present. Hepatomegaly. Splenomegaly. Extremities: No edema. Muscle wasting. Neurologic: Alert and oriented. Cranial nerves grossly intact. No asterixis. LABORATORY STUDIES: 
Liver [place] [address] & Units 3/19/2019 WBC 4.6 - 13.2 K/uL 27.9 (H) ANC 1.8 - 8.0 K/UL   
HGB 12.0 - 16.0 g/dL 9.4 (L) HGB 12 - 16 G/DL   
HGB (iSTAT) 12 - 16 G/DL   
 - 420 K/uL 194 INR 0.8 - 1.2 AST 15 - 37 U/L 26 ALT 13 - 56 U/L 13 Alk Phos 45 - 117 U/L 187 (H) Bili, Total 0.2 - 1.0 MG/DL 0.8 Albumin 3.4 - 5.0 g/dL 3.0 (L) BUN 7.0 - 18 MG/DL 26 (H) Creat 0.6 - 1.3 MG/DL 1.16 Na 136 - 145 mmol/L 135 (L)  
K 3.5 - 5.5 mmol/L 4.8 Cl 100 - 108 mmol/L 102 CO2 21 - 32 mmol/L 28 Glucose 74 - 99 mg/dL 92 SEROLOGIES: 
 Not available or performed. Testing was performed today. LIVER HISTOLOGY: 
3/2019. No inflammation, steatosis or fibrosis. Sinusoids are packed with hepatopoetic cells. ENDOSCOPIC PROCEDURES: 
Not available or performed RADIOLOGY: 
9/2018. CT scan abdomen with IV contrast.  Normal appearing but enlarged liver. No liver mass lesions. Splenomegaly. Moderate ascites. OTHER TESTING: 
10/2018. Bone marrow biopsy. Consistent with PVera, myeloproliferative disorder, myelofibrosis. 10/2018. ECHO heart. LVEF 61-65%. Moderate to severe AS. Mild to moderate PA HTN. Mild TR. FOLLOW-UP: 
All of the issues listed above in the Assessment and Plan were discussed with the patient. All questions were answered. The patient expressed a clear understanding of the above. [address] [place] [address] in 8 weeks to assess ascites after Aortic valve replacement. Tiny Hyatt MD 
[address] [address], [address] Andreashana Breonna [place], [address] 
[phone] [address] [place] [address]

## 2019-03-27 NOTE — LETTER
3/27/19 Patient: Kyara Casey YOB: 1940 Date of Visit: 3/27/2019 Kavita Figueroa MD 
[address] [facility] [address] [place] [address] VIA In Basket Dear Kavita Figueroa MD, Thank you for referring Ms. Kyara Casey to [address] [place] [address] for evaluation. My notes for this consultation are attached. If you have questions, please do not hesitate to call me. I look forward to following your patient along with you. Sincerely, Micaela Castro MD

## 2019-04-22 NOTE — PROGRESS NOTES
PT aware of need to hold anticoagulants per protocol. Pt denies. PT aware of arrival time pre procedure. Arrive at [facility] radiology waiting room on 4/25/19 at 0730 for scheduled procedure to occur at 0830. Pt states no questions at this time. Gave pt [facility] radiology rn phone number [phone].

## 2019-04-25 NOTE — DISCHARGE INSTRUCTIONS
[facility] PARACENTESIS DISCHARGE INSTRUCTIONS    General Information:  During this procedure, the doctor will insert a needle into the abdomen to drain fluid. After the procedure, you will be able to take a deep breath much easier. The site of the puncture may ooze the first day. This will decrease and eventually stop. Paracentesis (draining fluid from the abdomen) sometimes makes patients hypotensive (low blood pressure). Your doctor may order for you to receive fluids or albumin (a volume booster) during the procedure through an IV site. Home Care Instructions:  Keep the puncture site clean and dry. No tub baths or swimming until puncture site heals. Showering is acceptable. Resume your normal diet, and resume your normal activity slowly and as you tolerate. If you are short of breath, rest. If shortness of breath does not ease, please call your ordering doctor. Fluid can re-accumulate in the chest and/or in the abdomen. If this should occur, your doctor needs to know as you may need to have the procedure done again. Call If:     You should call your Physician and/or the Radiology Nurse if you notice any signs of infection, like pus draining, or if it is swollen or reddened. Also call if you have a fever, or if you are bleeding from the puncture site more than a small amount on the dressing. Call if the puncture site keeps draining fluid. Some oozing is to be expected, but should slow and then stop. Call if you feel like you have pressure in your abdomen. SEEK IMMEDIATE CARE OR CALL 911 IF YOU SUDDENLY HAVE TROUBLE BREATHING, OR IF YOUR LIPS TURN BLUE, OR IF YOU NOTICE BLOOD IN YOUR SPUTUM. Follow-Up Instructions: Please see your ordering doctor as he/she has requested.      To Reach Us:        Date: 4/25/2019  Discharging Nurse: Ronald Hawkins RN        DISCHARGE SUMMARY from Nurse    PATIENT INSTRUCTIONS:    After general anesthesia or intravenous sedation, for 24 hours or while taking prescription Narcotics:  · Limit your activities  · Do not drive and operate hazardous machinery  · Do not make important personal or business decisions  · Do  not drink alcoholic beverages  · If you have not urinated within 8 hours after discharge, please contact your surgeon on call. Report the following to your surgeon:  · Excessive pain, swelling, redness or odor of or around the surgical area  · Temperature over 100.5  · Nausea and vomiting lasting longer than 4 hours or if unable to take medications  · Any signs of decreased circulation or nerve impairment to extremity: change in color, persistent  numbness, tingling, coldness or increase pain  · Any questions    What to do at Home:  Recommended activity: Activity as tolerated,         *  Please give a list of your current medications to your Primary Care Provider. *  Please update this list whenever your medications are discontinued, doses are      changed, or new medications (including over-the-counter products) are added. *  Please carry medication information at all times in case of emergency situations. These are general instructions for a healthy lifestyle:    No smoking/ No tobacco products/ Avoid exposure to second hand smoke  Surgeon General's Warning:  Quitting smoking now greatly reduces serious risk to your health. Obesity, smoking, and sedentary lifestyle greatly increases your risk for illness    A healthy diet, regular physical exercise & weight monitoring are important for maintaining a healthy lifestyle    You may be retaining fluid if you have a history of heart failure or if you experience any of the following symptoms:  Weight gain of 3 pounds or more overnight or 5 pounds in a week, increased swelling in our hands or feet or shortness of breath while lying flat in bed. Please call your doctor as soon as you notice any of these symptoms; do not wait until your next office visit.     Recognize signs and symptoms of STROKE:    F-face looks uneven    A-arms unable to move or move unevenly    S-speech slurred or non-existent    T-time-call 911 as soon as signs and symptoms begin-DO NOT go       Back to bed or wait to see if you get better-TIME IS BRAIN. Warning Signs of HEART ATTACK     Call 911 if you have these symptoms:   Chest discomfort. Most heart attacks involve discomfort in the center of the chest that lasts more than a few minutes, or that goes away and comes back. It can feel like uncomfortable pressure, squeezing, fullness, or pain.  Discomfort in other areas of the upper body. Symptoms can include pain or discomfort in one or both arms, the back, neck, jaw, or stomach.  Shortness of breath with or without chest discomfort.  Other signs may include breaking out in a cold sweat, nausea, or lightheadedness. Don't wait more than five minutes to call 911 - MINUTES MATTER! Fast action can save your life. Calling 911 is almost always the fastest way to get lifesaving treatment. Emergency Medical Services staff can begin treatment when they arrive -- up to an hour sooner than if someone gets to the hospital by car. The discharge information has been reviewed with the patient and caregiver. The patient and caregiver verbalized understanding. Discharge medications reviewed with the patient and caregiver and appropriate educational materials and side effects teaching were provided.         Patient armband removed and shredded    ___________________________________________________________________________________________________________________________________

## 2019-04-25 NOTE — PROGRESS NOTES
Pt is all prepped and ready for procedure. 0910 Pt back from procedure. same tolerated well. Band- aid to abdomen dry and intact. 1010 pt discharged home in the care of son in stable condition with no complaints.

## 2019-04-25 NOTE — ROUTINE PROCESS
Ultrasound guided RLQ paracentesis was performed. Pt tolerated procedure well, no signs of distress. Peritoneal fluid was collected and sent to lab for cell count and diff.  8,660 mL of fluid drained. Pt returned to Care Unit in stable condition.

## 2019-04-25 NOTE — H&P
The patient is an appropriate candidate to undergo para. Patient assessed immediately prior to induction. Anesthesia plan as follows: Local/No Anesthesia. History and Physical update:  H&P was reviewed and the patient was examined. No changes have occurred in the patient's condition since the H&P was completed.     Regina Salomon MD  Vascular & Interventional Radiology  [facility]  4/25/2019

## 2019-05-20 NOTE — PROGRESS NOTES
PT aware of need to hold anticoagulants per protocol. Denies. PT aware of potential for need for  at discharge. PT aware of arrival time pre procedure. [facility] on 5/24/19 at 0700 for scheduled procedure to occur at 0800. Pt states no questions at this time. Gave pt [facility] radiology rn phone number [phone].

## 2019-05-24 NOTE — DISCHARGE INSTRUCTIONS
DISCHARGE SUMMARY from Nurse    PATIENT INSTRUCTIONS:    After general anesthesia or intravenous sedation, for 24 hours or while taking prescription Narcotics:  · Limit your activities  · Do not drive and operate hazardous machinery  · Do not make important personal or business decisions  · Do  not drink alcoholic beverages  · If you have not urinated within 8 hours after discharge, please contact your surgeon on call. Report the following to your surgeon:  · Excessive pain, swelling, redness or odor of or around the surgical area  · Temperature over 100.5  · Nausea and vomiting lasting longer than 4 hours or if unable to take medications  · Any signs of decreased circulation or nerve impairment to extremity: change in color, persistent  numbness, tingling, coldness or increase pain  · Any questions    What to do at Home:  Recommended activity: Activity as tolerated and no driving for today,       *  Please give a list of your current medications to your Primary Care Provider. *  Please update this list whenever your medications are discontinued, doses are      changed, or new medications (including over-the-counter products) are added. *  Please carry medication information at all times in case of emergency situations. These are general instructions for a healthy lifestyle:    No smoking/ No tobacco products/ Avoid exposure to second hand smoke  Surgeon General's Warning:  Quitting smoking now greatly reduces serious risk to your health.     Obesity, smoking, and sedentary lifestyle greatly increases your risk for illness    A healthy diet, regular physical exercise & weight monitoring are important for maintaining a healthy lifestyle    You may be retaining fluid if you have a history of heart failure or if you experience any of the following symptoms:  Weight gain of 3 pounds or more overnight or 5 pounds in a week, increased swelling in our hands or feet or shortness of breath while lying flat in bed.  Please call your doctor as soon as you notice any of these symptoms; do not wait until your next office visit. Recognize signs and symptoms of STROKE:    F-face looks uneven    A-arms unable to move or move unevenly    S-speech slurred or non-existent    T-time-call 911 as soon as signs and symptoms begin-DO NOT go       Back to bed or wait to see if you get better-TIME IS BRAIN. Warning Signs of HEART ATTACK     Call 911 if you have these symptoms:   Chest discomfort. Most heart attacks involve discomfort in the center of the chest that lasts more than a few minutes, or that goes away and comes back. It can feel like uncomfortable pressure, squeezing, fullness, or pain.  Discomfort in other areas of the upper body. Symptoms can include pain or discomfort in one or both arms, the back, neck, jaw, or stomach.  Shortness of breath with or without chest discomfort.  Other signs may include breaking out in a cold sweat, nausea, or lightheadedness. Don't wait more than five minutes to call 911 - MINUTES MATTER! Fast action can save your life. Calling 911 is almost always the fastest way to get lifesaving treatment. Emergency Medical Services staff can begin treatment when they arrive -- up to an hour sooner than if someone gets to the hospital by car. The discharge information has been reviewed with the patient. The patient verbalized understanding. Discharge medications reviewed with the patient and appropriate educational materials and side effects teaching were provided. ___________________________________________________________________________________________________________________________________  PARACENTESIS DISCHARGE INSTRUCTIONS    General Information:  During this procedure, the doctor will insert a needle into the abdomen to drain fluid. After the procedure, you will be able to take a deep breath much easier. The site of the puncture may ooze the first day.  This will decrease and eventually stop. Paracentesis (draining fluid from the abdomen) sometimes makes patients hypotensive (low blood pressure). Your doctor may order for you to receive fluids or albumin (a volume booster) during the procedure through an IV site. Home Care Instructions:  Keep the puncture site clean and dry. No tub baths or swimming until puncture site heals. Showering is acceptable. Resume your normal diet, and resume your normal activity slowly and as you tolerate. If you are short of breath, rest. If shortness of breath does not ease, please call your ordering doctor. Fluid can re-accumulate in the chest and/or in the abdomen. If this should occur, your doctor needs to know as you may need to have the procedure done again. Call If:     You should call your Physician and/or the Radiology Nurse if you notice any signs of infection, like pus draining, or if it is swollen or reddened. Also call if you have a fever, or if you are bleeding from the puncture site more than a small amount on the dressing. Call if the puncture site keeps draining fluid. Some oozing is to be expected, but should slow and then stop. Call if you feel like you have pressure in your abdomen. SEEK IMMEDIATE CARE OR CALL 911 IF YOU SUDDENLY HAVE TROUBLE BREATHING, OR IF YOUR LIPS TURN BLUE, OR IF YOU NOTICE BLOOD IN YOUR SPUTUM. Follow-Up Instructions: Please see your ordering doctor as he/she has requested.      To Reach Us:

## 2019-05-24 NOTE — ROUTINE PROCESS
Back from procedure. Dressing intact to right side of abdomen, Albumin infusing   Diet given. 1015 IV removed Albumin completed. Pt discharged home via w/c in stable condition in care of son. denies pain. Dressing intact to right side of abdomen.

## 2019-05-24 NOTE — BRIEF OP NOTE
Vascular & Interventional Radiology Brief Procedure Note    Interventional Radiologist: Musa Rico MD    Pre-operative Diagnosis:  ascites    Post-operative Diagnosis: Same as pre-op dx    Procedure(s) Performed:  paracentesis    Anesthesia:  Local and Moderate Sedation    Findings:  Uncomplicated RLQ para, final volume pending.      Complications: None    Estimated Blood Loss:  minimal    Tubes and Drains: None    Specimens: None    Condition: Good       Musa Rico MD  [facility]  Vascular & Interventional Radiology  5/24/2019

## 2019-05-24 NOTE — ROUTINE PROCESS
Ultrasound guided paracentesis of the right lower quadrant. 9,205 ML of fluid drained. Fluid taken to lab for testing. Patient taken to care unit in stable condition.

## 2019-05-30 NOTE — PROGRESS NOTES
[facility], Zeus ESCAMILLA, Benton Echevarria MD Russella Jules, PA-DUNIA Banegas, [facility]     Ermelinda Francisco, [facility]   Tiffanie Adler, P-DUNIA Santos, [facility]       Nannette Benavides [address]    at [address], [address] [place] [address], [facility] 22.    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  ICD-9-CM: 289.83  3/27/2019        Splenomegaly ICD-10-CM: R16.1  ICD-9-CM: 789.2  10/10/2018        Ascites ICD-10-CM: R18.8  ICD-9-CM: 789.59  4/24/2018        Aortic stenosis ICD-10-CM: I35.0  ICD-9-CM: 424.1  4/24/2018        Phlebitis and thrombophlebitis of femoral vein (deep) (superficial) ([facility] ICD-10-CM: I80.10  ICD-9-CM: 451.11  7/31/2016        Polycythemia vera ([facility] ICD-10-CM: D45  ICD-9-CM: 238.4  6/30/2016        Coronary artery disease involving native coronary artery ICD-10-CM: I25.10  ICD-9-CM: 414.01  6/30/2016                Melly Jaimes returns to the [address] for management of ascites. The active problem list, all pertinent past medical history, medications, radiologic findings and laboratory findings related to the liver disorder were reviewed with the patient. The patient is a 70 y.o.  female who developed abdominal distention in 2017. 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 She is taking hydroxyurea for PVera. She developed ascites and underwent paracentesis in 7/2018. Cytology of the ascites was negative. There was no other analysis of the fluid such as cell count or albumin. Ascites has resolved with diuretics. An attempt at paracentesis since the last office appointment demonstrated only minimal ascites. The patient underwent a transjugular liver biopsy with hepatic venous pressure measurements in 3/2019. I have personally reviewed the liver biopsy slides. This demonstrates Normal liver, no fibrosis, extensive extramedulary hematopoesis throughout sinusoids. Hepatic vein pressures were. RA 1, IVC 2, free hepatic vein 3, wedge hepatic vein 14. HVPG = 11. The patient notes fatigue, swelling of the abdomen,     The patient has not experienced pain in the right side over the liver, problems concentrating, swelling of the lower extremities, hematemesis, hematochezia. The patient has limitations in functional activities which can be attributed to the liver disease and to other medical problems that are not related to the liver disease. 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 The patient does not have cirrhosis    ECHO demonstrated mild pulmonary HTN but normal RV and only mild TR. Elastography demonstrated elevated liver stiffness. This is due to filling the sinusoids with hematology precursors and causing hepatic congestion from physiology that is consistent with outflow obstruction. Continue step 1 diuretics. Continue paracentesis as needed.     Since the HVPG is elevated a TIPS could be considered, should reduce portal pressure and should aleviate ascites   I would hold off on performing TIPS until she has aortic valvuloplasty as this may reduce mild pulmonary HTN and reduce ascites formation. Aortic stenosis  ECHO showed tight aortic stenosis. This is likely the etiology for mild pulmonary HTN  She will need valvuloplasty  There is no contraindication to performing percutaneous aortic valve replacement. If fact this would be recommended since it is likely to improve forward cardiac output. Treatment of other medical problems in patients with chronic liver disease  There are no contraindications for the patient to take any medications that are necessary for treatment of other medical issues. The patient does not consume alcohol daily. Normal doses of acetaminophen can be used for pain as needed. Normal doses of acetaminophen as recommended on the label are not hepatotoxic, even in patients with cirrhosis. Counseling for alcohol in patients with chronic liver disease  The patient was counseled regarding alcohol consumption and the effect of alcohol on chronic liver disease. The patient does not consume any significant amount of alcohol. Vaccinations   The need for vaccination against viral hepatitis A and B will be assessed with serologic and instituted as appropriate. Routine vaccinations against other bacterial and viral agents can be performed as indicated. Annual flu vaccination should be administered if indicated. ALLERGIES  No Known Allergies    MEDICATIONS  Current Outpatient Medications   Medication Sig    aspirin 81 mg chewable tablet Take 81 mg by mouth every other day.  furosemide (LASIX) 40 mg tablet 1 tab po bid    hydroxyurea (HYDREA) 500 mg capsule Take 500 mg by mouth daily.  spironolactone (ALDACTONE) 100 mg tablet Take  by mouth daily. No current facility-administered medications for this visit.         SYSTEM REVIEW NOT RELATED TO LIVER DISEASE OR REVIEWED ABOVE:  Constitution systems: Negative for fever, chills, weight gain, weight loss. Eyes: Negative for visual changes. ENT: Negative for sore throat, painful swallowing. Respiratory: Negative for cough, hemoptysis, SOB. Cardiology: Negative for chest pain, palpitations. GI:  Negative for constipation or diarrhea. : Negative for urinary frequency, dysuria, hematuria, nocturia. Skin: Negative for rash. Hematology: Negative for easy bruising, blood clots. Musculo-skelatal: Negative for back pain, muscle pain, weakness. Neurologic: Negative for headaches, dizziness, vertigo, memory problems not related to HE. Psychology: Negative for anxiety, depression. FAMILY HISTORY:  The father  of PVera, CVA. The mother  of stomach cancer. There is no family history of liver disease. SOCIAL HISTORY:  The patient is . The patient has 1 child. The patient has never used tobacco products. The patient has never consumed significant amounts of alcohol. PHYSICAL EXAMINATION:  Visit Vitals  /70   Pulse 85   Temp 99.4 °F (37.4 °C) (Tympanic)   Ht 5' 5.5\" (1.664 m)   Wt 146 lb (66.2 kg)   SpO2 96%   BMI 23.93 kg/m²     General: No acute distress. Eyes: Sclera anicteric. ENT: No oral lesions. Thyroid normal.  Nodes: No adenopathy. Skin: No spider angiomata. No jaundice. No palmar erythema. Respiratory: Lungs clear to auscultation. Cardiovascular: Regular heart rate. No murmurs. No JVD. Abdomen: Distended. Some ascites appears the be present. Hepatomegaly. Splenomegaly. Extremities: No edema. Muscle wasting. Neurologic: Alert and oriented. Cranial nerves grossly intact. No asterixis.     LABORATORY STUDIES:  Liver [place] of [address] & Units 3/19/2019   WBC 4.6 - 13.2 K/uL 27.9 (H)   ANC 1.8 - 8.0 K/UL    HGB 12.0 - 16.0 g/dL 9.4 (L)   HGB 12 - 16 G/DL    HGB (iSTAT) 12 - 16 G/DL     - 420 K/uL 194   INR 0.8 - 1.2      AST 15 - 37 U/L 26   ALT 13 - 56 U/L 13   Alk Phos 45 - 117 U/L 187 (H)   Bili, Total 0.2 - 1.0 MG/DL 0.8   Albumin 3.4 - 5.0 g/dL 3.0 (L)   BUN 7.0 - 18 MG/DL 26 (H)   Creat 0.6 - 1.3 MG/DL 1.16   Na 136 - 145 mmol/L 135 (L)   K 3.5 - 5.5 mmol/L 4.8   Cl 100 - 108 mmol/L 102   CO2 21 - 32 mmol/L 28   Glucose 74 - 99 mg/dL 92     SEROLOGIES:  Not available or performed. Testing was performed today. LIVER HISTOLOGY:  3/2019. No inflammation, steatosis or fibrosis. Sinusoids are packed with hepatopoetic cells. ENDOSCOPIC PROCEDURES:  Not available or performed    RADIOLOGY:  9/2018. CT scan abdomen with IV contrast.  Normal appearing but enlarged liver. No liver mass lesions. Splenomegaly. Moderate ascites. OTHER TESTING:  10/2018. Bone marrow biopsy. Consistent with PVera, myeloproliferative disorder, myelofibrosis. 10/2018. ECHO heart. LVEF 61-65%. Moderate to severe AS. Mild to moderate PA HTN. Mild TR. FOLLOW-UP:  All of the issues listed above in the Assessment and Plan were discussed with the patient. All questions were answered. The patient expressed a clear understanding of the above. [facility] [place] [address] in 4 weeks to assess ascites after Aortic valve replacement.       Maxim Coppola MD  [address]  [address], [address] [place] [address], [address] [place] [address] - [address]  [address] [place] [address]

## 2019-06-18 NOTE — PROGRESS NOTES
[address] [place] Lois Tripathi MD, BLADIMIR Carter MD Baby Boon, PA-DUNIA Crook, [facility] April S Nolan, [facility]   Varun Pat, FNP-C Gurmeetdinorris Reeder, [facility] Nannette Deputado Giancarlo De Garcia 136 
  at [place] [address] [address] [facility] [place], [address] [address]  22. 
  [phone] FAX: [address] [place] [address] 
  [address], [address] [place], [address] 
  [phone] FAX: [phone] Patient Care Team: 
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  
  Of spleen and liver Myelofibrosis ([facility].) ICD-10-CM: Q78.30 ICD-9-CM: 289.83  3/27/2019 Splenomegaly ICD-10-CM: R16.1 ICD-9-CM: 789.2  10/10/2018 Ascites ICD-10-CM: R18.8 ICD-9-CM: 789.59  4/24/2018 Aortic stenosis ICD-10-CM: I35.0 ICD-9-CM: 424.1  4/24/2018 Phlebitis and thrombophlebitis of femoral vein (deep) (superficial) (HCC) ICD-10-CM: I80.10 ICD-9-CM: 451.11  7/31/2016 Polycythemia vera ([facility].) ICD-10-CM: E16 ICD-9-CM: 238.4  6/30/2016 Coronary artery disease involving native coronary artery ICD-10-CM: I25.10 ICD-9-CM: 414.01  6/30/2016 Zeinab Baugh returns to the [place] [address] for management of ascites. The active problem list, all pertinent past medical history, medications, radiologic findings and laboratory findings related to the liver disorder were reviewed with the patient. The patient is a 70 y.o.  female who developed abdominal distention in 2017. 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 She is taking hydroxyurea for PVera. She developed ascites and underwent paracentesis in 7/2018. Cytology of the ascites was negative. There was no other analysis of the fluid such as cell count or albumin. Ascites has resolved with diuretics. An attempt at paracentesis since the last office appointment demonstrated only minimal ascites. The patient underwent a transjugular liver biopsy with hepatic venous pressure measurements in 3/2019. I have personally reviewed the liver biopsy slides. This demonstrates Normal liver, no fibrosis, extensive extramedulary hematopoesis throughout sinusoids. Hepatic vein pressures were. RA 1, IVC 2, free hepatic vein 3, wedge hepatic vein 14. HVPG = 11. The patient notes fatigue, swelling of the abdomen, The patient has not experienced pain in the right side over the liver, problems concentrating, swelling of the lower extremities, hematemesis, hematochezia. The patient has limitations in functional activities which can be attributed to the liver disease and to other medical problems that are not related to the liver disease. ASSESSMENT AND PLAN: 
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 The patient does not have cirrhosis ECHO demonstrated mild pulmonary HTN but normal RV and only mild TR. Elastography demonstrated elevated liver stiffness. This is due to filling the sinusoids with hematology precursors and causing hepatic congestion from physiology that is consistent with outflow obstruction. Continue step 1 diuretics. Continue paracentesis as needed. Since the HVPG is elevated a TIPS could be considered, should reduce portal pressure and should aleviate ascites Since there is no critical aortic stenosis and no PHTN can proceed with TIPS to reduce need for paracenteiss. Aortic stenosis ECHO showed tight aortic stenosis. Cardiac catheterization showed moderate aortic stenosis. 100% occlusion LAD. No PHTN. There is no contraindication to performing percutaneous aortic valve replacement. If fact this would be recommended since it is likely to improve forward cardiac output. Treatment of other medical problems in patients with chronic liver disease There are no contraindications for the patient to take any medications that are necessary for treatment of other medical issues. The patient does not consume alcohol daily. Normal doses of acetaminophen can be used for pain as needed. Normal doses of acetaminophen as recommended on the label are not hepatotoxic, even in patients with cirrhosis. Counseling for alcohol in patients with chronic liver disease The patient was counseled regarding alcohol consumption and the effect of alcohol on chronic liver disease. The patient does not consume any significant amount of alcohol. Vaccinations The need for vaccination against viral hepatitis A and B will be assessed with serologic and instituted as appropriate. Routine vaccinations against other bacterial and viral agents can be performed as indicated. Annual flu vaccination should be administered if indicated. ALLERGIES No Known Allergies MEDICATIONS Current Outpatient Medications Medication Sig  
 aspirin 81 mg chewable tablet Take 81 mg by mouth every other day.  furosemide (LASIX) 40 mg tablet 1 tab po bid  hydroxyurea (HYDREA) 500 mg capsule Take 500 mg by mouth daily.  spironolactone (ALDACTONE) 100 mg tablet Take  by mouth daily.   
 cholecalciferol (VITAMIN D3) 1,000 unit tablet Take 1,000 Units by mouth daily. Indications: not sure of dose No current facility-administered medications for this visit. SYSTEM REVIEW NOT RELATED TO LIVER DISEASE OR REVIEWED ABOVE: 
Constitution systems: Negative for fever, chills, weight gain, weight loss. Eyes: Negative for visual changes. ENT: Negative for sore throat, painful swallowing. Respiratory: Negative for cough, hemoptysis, SOB. Cardiology: Negative for chest pain, palpitations. GI:  Negative for constipation or diarrhea. : Negative for urinary frequency, dysuria, hematuria, nocturia. Skin: Negative for rash. Hematology: Negative for easy bruising, blood clots. Musculo-skelatal: Negative for back pain, muscle pain, weakness. Neurologic: Negative for headaches, dizziness, vertigo, memory problems not related to HE. Psychology: Negative for anxiety, depression. FAMILY HISTORY: 
The father  of PVera, CVA. The mother  of stomach cancer. There is no family history of liver disease. SOCIAL HISTORY: 
The patient is . The patient has 1 child. The patient has never used tobacco products. The patient has never consumed significant amounts of alcohol. PHYSICAL EXAMINATION: 
Visit Vitals /69 (BP 1 Location: Right arm, BP Patient Position: Sitting) Pulse 87 Temp 97.9 °F (36.6 °C) Ht 5' 5.5\" (1.664 m) Wt 152 lb (68.9 kg) SpO2 95% BMI 24.91 kg/m² General: No acute distress. Eyes: Sclera anicteric. ENT: No oral lesions. Thyroid normal. 
Nodes: No adenopathy. Skin: No spider angiomata. No jaundice. No palmar erythema. Respiratory: Lungs clear to auscultation. Cardiovascular: Regular heart rate. No murmurs. No JVD. Abdomen: Distended. Some ascites appears the be present. Hepatomegaly. Splenomegaly. Extremities: No edema. Muscle wasting. Neurologic: Alert and oriented. Cranial nerves grossly intact. No asterixis.  
 
LABORATORY STUDIES: 
 Liver [place] [address] Units 3/19/2019 WBC 4.6 - 13.2 K/uL 27.9 (H) ANC 1.8 - 8.0 K/UL   
HGB 12.0 - 16.0 g/dL 9.4 (L) HGB 12 - 16 G/DL   
HGB (iSTAT) 12 - 16 G/DL   
 - 420 K/uL 194 INR 0.8 - 1.2 AST 15 - 37 U/L 26 ALT 13 - 56 U/L 13 Alk Phos 45 - 117 U/L 187 (H) Bili, Total 0.2 - 1.0 MG/DL 0.8 Albumin 3.4 - 5.0 g/dL 3.0 (L) BUN 7.0 - 18 MG/DL 26 (H) Creat 0.6 - 1.3 MG/DL 1.16 Na 136 - 145 mmol/L 135 (L)  
K 3.5 - 5.5 mmol/L 4.8 Cl 100 - 108 mmol/L 102 CO2 21 - 32 mmol/L 28 Glucose 74 - 99 mg/dL 92 SEROLOGIES: 
Not available or performed. Testing was performed today. LIVER HISTOLOGY: 
3/2019. No inflammation, steatosis or fibrosis. Sinusoids are packed with hepatopoetic cells. ENDOSCOPIC PROCEDURES: 
Not available or performed RADIOLOGY: 
9/2018. CT scan abdomen with IV contrast.  Normal appearing but enlarged liver. No liver mass lesions. Splenomegaly. Moderate ascites. OTHER TESTING: 
10/2018. Bone marrow biopsy. Consistent with PVera, myeloproliferative disorder, myelofibrosis. 10/2018. ECHO heart. LVEF 61-65%. Moderate to severe AS. Mild to moderate PA HTN. Mild TR. 
11/2018. Moderate aortic stenosis. 100% occlusion of LAD. No significant PHTN. FOLLOW-UP: 
All of the issues listed above in the Assessment and Plan were discussed with the patient. All questions were answered. The patient expressed a clear understanding of the above. [address] [place] [address] in 4 weeks to assess ascites after Aortic valve replacement. Gayla Huber MD 
[address] [facility], [address] Vandana Gregorio, [address] 
[phone] [facility]

## 2019-06-18 NOTE — PROGRESS NOTES
1. Have you been to the ER, urgent care clinic since your last visit? Hospitalized since your last visit? No 
 
2. Have you seen or consulted any other health care providers outside of the [address] [place] [address] since your last visit? Include any pap smears or colon screening. Yes

## 2019-06-18 NOTE — Clinical Note
7/14/19 Patient: Hector Paz YOB: 1940 Date of Visit: 6/18/2019 Cruz Howell MD 
[address] [facility] [address] [place] [address] VIA In Basket Dear Cruz Howell MD, Thank you for referring Ms. Hector Paz to [address] for evaluation. My notes for this consultation are attached. If you have questions, please do not hesitate to call me. I look forward to following your patient along with you. Sincerely, Maxine Andrade MD

## 2019-06-24 NOTE — PROGRESS NOTES
PT aware of arrival time pre procedure. Will arrive at 0930 1030 procedure. Pt states no questions at this time.

## 2019-06-25 NOTE — PROCEDURES
POST PARACENTESIS PROCEDURE NOTE:    Procedure: US Guided Paracentesis    Pre-operative Diagnosis: Ascites    Post-operative Diagnosis: same    Indication: Ascites    Procedure Details: Standard aseptic technique. Ultrasound guidance. 1% lidocaine for local anesthesia. Estimated 8.95 L of ascites removed. Please see final report for full details. Specimen:8.95 L ascites removed, sent to lab if requested         Complications:  None. EBL: Minimal.               Condition: Stable. Impression:  Successful paracentesis. Aris Jaquez MD  Vascular & Interventional Radiology    [facility]     6/25/2019

## 2019-06-25 NOTE — PROGRESS NOTES
Back from procedure. Band aid to right side of abdomen. Pt denies pain. Albumin infusing. 1120 Albumin completed. IV removed. Diet given tolerated well. Discharge instructions reviewed, verbalized understanding. 200 Discharged home via w/c in care of son in stable condition. Band-aid intact to right side of abdomen. denies pain.

## 2019-06-25 NOTE — DISCHARGE INSTRUCTIONS
Patient Education     DISCHARGE SUMMARY from Nurse    PATIENT INSTRUCTIONS:    After general anesthesia or intravenous sedation, for 24 hours or while taking prescription Narcotics:  · Limit your activities  · Do not drive and operate hazardous machinery  · Do not make important personal or business decisions  · Do  not drink alcoholic beverages  · If you have not urinated within 8 hours after discharge, please contact your surgeon on call. Report the following to your surgeon:  · Excessive pain, swelling, redness or odor of or around the surgical area  · Temperature over 100.5  · Nausea and vomiting lasting longer than 4 hours or if unable to take medications  · Any signs of decreased circulation or nerve impairment to extremity: change in color, persistent  numbness, tingling, coldness or increase pain  · Any questions    What to do at Home:  Recommended activity: Activity as tolerated,     If you experience any of the following symptoms , please follow up with . *  Please give a list of your current medications to your Primary Care Provider. *  Please update this list whenever your medications are discontinued, doses are      changed, or new medications (including over-the-counter products) are added. *  Please carry medication information at all times in case of emergency situations. These are general instructions for a healthy lifestyle:    No smoking/ No tobacco products/ Avoid exposure to second hand smoke  Surgeon General's Warning:  Quitting smoking now greatly reduces serious risk to your health.     Obesity, smoking, and sedentary lifestyle greatly increases your risk for illness    A healthy diet, regular physical exercise & weight monitoring are important for maintaining a healthy lifestyle    You may be retaining fluid if you have a history of heart failure or if you experience any of the following symptoms:  Weight gain of 3 pounds or more overnight or 5 pounds in a week, increased swelling in our hands or feet or shortness of breath while lying flat in bed. Please call your doctor as soon as you notice any of these symptoms; do not wait until your next office visit. The discharge information has been reviewed with the patient. The patient verbalized understanding. Discharge medications reviewed with the patient and appropriate educational materials and side effects teaching were provided. ___________________________________________________________________________________________________________________________________     Learning About Paracentesis  What is paracentesis? Paracentesis (say \"wkwn-xq-nfz-PAVAN-deyanira\") is a procedure that removes fluid from the belly. The buildup of fluid may be caused by infection, inflammation, an injury, or other problems. Swelling from too much fluid may cause pain or trouble breathing. The doctor will remove the extra fluid with a needle attached to a tube. Your doctor may remove the fluid to:  · Diagnose infection, injury, or other conditions. · Relieve pressure in your belly. How is the procedure done? Your doctor or nurse will clean the area of your belly where the needle will go in. Then he or she will put sterile towels around the area. You may get a shot of numbing medicine in your belly. Then your doctor will gently insert a needle where the fluid is. He or she may attach a tube (catheter) to the site to help collect the fluid. The procedure may take from a few minutes to 30 minutes or more. After the fluid has drained, your doctor will take out the needle or catheter and put a bandage on the site. What can you expect after the procedure? Your doctor will watch your pulse, blood pressure, and temperature for about an hour. If your doctor thinks that testing the fluid can help find the cause of a problem, he or she will send it to a lab.   For up to 2 days after the procedure, you may have a small amount of clear fluid coming out of the site where the needle was inserted, especially if you had a lot of fluid removed. You may need to change the bandage on the site. You can do your normal activities after the procedure, unless your doctor tells you not to. If fluid builds up in your belly again, your doctor may repeat this procedure. When should you call for help? Call 911 anytime you think you may need emergency care. For example, call if:  · You passed out (lost consciousness). Call your doctor now or seek immediate medical care if:  · You have symptoms of infection, such as:  ? Increased pain, swelling, warmth, or redness. ? Red streaks or pus. ? A fever. · You are dizzy or lightheaded, or you feel like you may faint. · You have new or worse belly pain. Watch closely for changes in your health, and be sure to contact your doctor if:  · Fluid builds up in your belly again. · You do not get better as expected. Where can you learn more? Go to http://alex-harry.info/. Enter X447 in the search box to learn more about \"Learning About Paracentesis. \"  Current as of: March 27, 2018  Content Version: 11.9  © 3184-7545 "[employer] Care instructions adapted under license by [employer] (which disclaims liability or warranty for this information). If you have questions about a medical condition or this instruction, always ask your healthcare professional. [place] [address] any warranty or liability for your use of this information.   Patient armband removed and shredded

## 2019-06-25 NOTE — ROUTINE PROCESS
Ultrasound guided RLQ paracentesis was performed. Pt tolerated procedure well, no signs of distress. Peritoneal fluid was collected and sent to lab for cell count and diff.  8,950 mL of fluid drained. Pt returned to Care Unit in stable condition.

## 2019-07-15 NOTE — TELEPHONE ENCOUNTER
Called patient to schedule 3 month follow up. Patient didn't answer. Left voicemail stating to call back at earliest convenience to schedule a 3 month follow up to [phone].

## 2019-07-15 NOTE — TELEPHONE ENCOUNTER
----- Message from Austen Hilliard MD sent at 7/14/2019  8:28 AM EDT -----  Schedule FU appt in 3 months.   DIAMOND

## 2019-07-22 NOTE — PROGRESS NOTES
PT aware of NPO status. PT aware of need to hold anticoagulants per protocol. Pt denies taking plavix which was ordered post aortic stent on 6/26/2019 by Bee Rivera MD, [facility]. PT aware of arrival time pre procedure. Pt states no questions at this time.

## 2019-07-24 PROBLEM — Z95.2 S/P AVR (AORTIC VALVE REPLACEMENT): Status: ACTIVE | Noted: 2019-01-01

## 2019-07-24 PROBLEM — K92.2 GI BLEEDING: Status: ACTIVE | Noted: 2019-01-01

## 2019-07-24 PROBLEM — D62 ANEMIA DUE TO ACUTE BLOOD LOSS: Status: ACTIVE | Noted: 2019-01-01

## 2019-07-24 NOTE — CONSULTS
Medicine Consult    Patient:  Hector Paz 70 y.o. female  Asked to evaluate patient by Dr. Jennifer Hanson   Primary Care Provider:  Nicki Batista MD  Date of Admission:  7/24/2019  Reason for Consult:         Assessment/Plan       Hospital Problems  Date Reviewed: 7/14/2019          Codes Class Noted POA    S/P AVR (aortic valve replacement) ICD-10-CM: Z95.2  ICD-9-CM: V43.3  7/24/2019 Unknown        GI bleeding ICD-10-CM: K92.2  ICD-9-CM: 578.9  7/24/2019 Unknown        Anemia due to acute blood loss ICD-10-CM: D62  ICD-9-CM: 285.1  7/24/2019 Unknown        Myelofibrosis ([facility] [address] ICD-10-CM: D75.81  ICD-9-CM: 289.83  3/27/2019 Yes        Ascites ICD-10-CM: R18.8  ICD-9-CM: 789.59  4/24/2018 Yes        Polycythemia vera ([facility] [address] ICD-10-CM: D45  ICD-9-CM: 238.4  6/30/2016 Yes        Coronary artery disease involving native coronary artery ICD-10-CM: I25.10  ICD-9-CM: 414.01  6/30/2016 Yes              PLAN:  Gi bleeding and AVR   Need h/h monitoring she has been on aspirin and plavix for her recent AVR, at that point, difficult to decide to hold aspirin/plavix  She had AVR one month ago per Rei Poole, for pt's safety, recommend to transfer to [facility]    Anemia- due to acute gi bleeding   no acute bleeding since arriving er, she hold plavix per her self without telling Dr, Linna Merlin Jannice Ben     Ascites and liver disease     Cad stable   Dr. Yony Schaeffer was called- no varices before. Having scheduled to be tapped in two days, no abdomen pain now   Thank you for allowing us to participate in  Pt's care and will follow   HPI:   CC:rectal bleeding   Hector Paz is a 70 y.o. female with past medical history significant for recent arv on aspirin/plavix, polycythemia vera , myelofibrosis came to ER due to rectal bleeding. She had 4 red bright blood per BM. She had AVR on aspirin and plavix. She stopped plavix per her self. Dr. Remi Younger was called and recommend to call gi. per  Anayeli-pt never has varices. Pt was seen in ER and discussed with her complicated situation. due to recent avr replacement, she will be benefit to be transferred back to [place]. She indicated a verbal understanding. Past Medical History:   Diagnosis Date    Aortic stenosis     Arrhythmia     [facility] CA [facility] [address]     CAD (coronary artery disease)     cabg 20 yrs ago  2019 leaking heart valve to be repaired 6/26/19    Cancer ([facility] [address]     breast, rt    Cellulitis of left leg 06/28/2016    pt denies    Ill-defined condition     polocythemia vera    Liver disease     swollen liver-swollen sleen-hernia 2017    Thromboembolus ([facility] [address] 06/2016    DVT leg leg     Past Surgical History:   Procedure Laterality Date    ABDOMEN SURGERY PROC UNLISTED      BREAST SURGERY PROCEDURE UNLISTED  2005    part. rt.mastectomy    CARDIAC SURG PROCEDURE UNLIST      2x bypass 20 years ago    CHEST SURGERY PROCEDURE UNLISTED      HX AORTIC VALVE REPLACEMENT  06/2019    HX CORONARY ARTERY BYPASS GRAFT  1996    HX HEENT      cataracts    IR BX TRANSCATHETER  3/22/2019      Social History     Tobacco Use    Smoking status: Never Smoker    Smokeless tobacco: Never Used   Substance Use Topics    Alcohol use: Yes     Alcohol/week: 0.8 standard drinks     Types: 1 Glasses of wine per week     Frequency: Never     Comment: 2 glasses per month    Drug use: No     Family History   Problem Relation Age of Onset    Migraines Maternal Grandmother     Cancer Mother     Cancer Father      No current facility-administered medications on file prior to encounter. Current Outpatient Medications on File Prior to Encounter   Medication Sig Dispense Refill    aspirin 81 mg chewable tablet Take 81 mg by mouth every other day.  hydroxyurea (HYDREA) 500 mg capsule Take 500 mg by mouth daily.  cholecalciferol (VITAMIN D3) 1,000 unit tablet Take 1,000 Units by mouth daily.  Indications: not sure of dose      furosemide (LASIX) 40 mg tablet 1 tab po bid (Patient taking differently: 20 mg. 1 tab po bid) 60 Tab 0    spironolactone (ALDACTONE) 100 mg tablet Take  by mouth daily. No Known Allergies        Review of Systems  Constitutional: No fever, chills, diaphoresis, malaise, fatigue or weight gain/loss or falls  Skin: no itching or rashes  HEENT: no ear discomfort, hearing loss, tinnitus, epistaxis or sore throat  EYES: no blurry vision, double vision or photophobia  CARDIOVASCULAR: no claudication, cp, palpitations, orthopnea, pnd or LE edema  PULMONARY: no cough, wheeze, shortness of breath or sputum production  GI: no nausea, vomiting, diarrhea, abdominal pain, +hematemesis or brbpr, ascites   : no dysuria, hematuria  MUSCULOSKELETAL: no back pain, joint pain or myalgias  ENDOCRINE: no heat/cold intolerance, polyuria or polydipsia  HEME: + easy bruising or bleeding  NEURO: no unilateral weakness, numbness, tingling or seizures      Physical Exam:      Visit Vitals  /61 (BP 1 Location: Left arm, BP Patient Position: At rest)   Pulse 86   Temp 97.2 °F (36.2 °C)   Resp 15   Ht 5' 5\" (1.651 m)   Wt 68.5 kg (151 lb)   SpO2 98%   BMI 25.13 kg/m²     Body mass index is 25.13 kg/m².     Physical Exam:  GEN: well nourished, laying in bed in no acute distress  HEENT: atraumatic, nose normal,oropharynx clear, MMM  NECK: supple, trachea midline, no supraclavicular or submandibular adenopathy noted  EYES: conjuctiva normal, lids with out lesions, PERRL  HEART: murmur , pmi nondisplaced, pulses 2+ distally  LUNGS: equal chest wall expansion, cta bl with out wheezes/rales or rhonchi  AB: ascites  +BS,   NEURO: alert, awake and oriented x3, gait not assessed, cranial nerves intact, strength 5/5 bl UE and LE, sensation intact, reflexes nonpathological  SKIN: dry, intact, warm no breakdown noted        Laboratory Studies:      Labs: Results:       Chemistry Recent Labs     07/24/19  0814   GLU 84      K 4.4      CO2 26   BUN 37* CREA 1.00   CA 8.4*   AGAP 7   BUCR 37*   *   TP 7.1   ALB 3.0*   GLOB 4.1*   AGRAT 0.7*      CBC w/Diff Recent Labs     07/24/19  1010 07/24/19 0814   WBC  --  35.4*   RBC  --  4.52   HGB 9.9* 10.5*   HCT 34.0* 35.2   PLT  --  199   GRANS  --  77*   LYMPH  --  6*   EOS  --  1      Cardiac Enzymes No results for input(s): CPK, CKND1, ANNIE in the last 72 hours. No lab exists for component: CKRMB, TROIP   Coagulation Recent Labs     07/24/19 0814   PTP 14.1   INR 1.1   APTT 40.1*       Lipid Panel Lab Results   Component Value Date/Time    Cholesterol, total 116 11/29/2018 07:40 AM    HDL Cholesterol 35 (L) 11/29/2018 07:40 AM    LDL, calculated 67.2 11/29/2018 07:40 AM    VLDL, calculated 13.8 11/29/2018 07:40 AM    Triglyceride 69 11/29/2018 07:40 AM    CHOL/HDL Ratio 3.3 11/29/2018 07:40 AM      BNP No results for input(s): BNPP in the last 72 hours. Liver Enzymes Recent Labs     07/24/19 0814   TP 7.1   ALB 3.0*   *   SGOT 46*      Thyroid Studies No results found for: T4, T3U, TSH, TSHEXT, TSHEXT         Imaging studies personally reviewed:  Significant Diagnostic Studies: Us Guide Paracentesis    Result Date: 6/25/2019  Paracentesis with ultrasound for guidance: Indication: Recurrent ascites. Procedure: This procedure was performed with standard aseptic technique and after informed consent. Time out/start time:  10:16 AM Ultrasound localization: An appropriate location was marked for planned paracentesis. Recurrent ascites is noted. Paracentesis: After local anesthesia, a 6 [employer] Yueh catheter needle was advanced into the peritoneal cavity. A total of 8.95 liters of ascitic fluid was aspirated. The patient tolerated the procedure well. Impression: Successful uncomplicated ultrasound guided paracentesis.             El Rubio MD, Internal Medicine

## 2019-07-24 NOTE — ED TRIAGE NOTES
Triage: pt reports large amounts of bright red blood from rectum x 4 hours. Pt with abdominal distention. States that she is scheduled for a paracentesis in 2 days.

## 2019-07-24 NOTE — ED NOTES
Pt informed that this RN will be back in ~30 min for PIV placement after Ativan has some time to start working for her. Lights dimmed per pt request to help herself relax more. Pt instructed to use call bell if she needs help prior to this RN returning for bloodwork.

## 2019-07-24 NOTE — ED PROVIDER NOTES
EMERGENCY DEPARTMENT HISTORY AND PHYSICAL EXAM    Date: 7/24/2019  Patient Name: Pablo Gonzales    History of Presenting Illness     Chief Complaint   Patient presents with    Rectal Bleeding         History Provided By: Patient      Pablo Gonzales is a 70 y.o. female with PMHX of CAD with CABG 20 years ago, aortic stenosis, polycythemia of area with liver disease and ascites who presents to the emergency department C/O for episodes of bright red blood per rectum. Patient notes that she was in her normal state of health until 3 AM this morning when she went to the bathroom and saw a large amount of bright red blood per rectum. She notes she has a history of hemorrhoids, she did not have any hard stool beforehand. She had 4 episodes with copious bleeding. The bleeding has since stopped since she arrived in the ED. She denies any changes in vision, headache, neck pain, shortness of breath, chest pain, nausea or vomiting, abdominal pain. She endorses abdominal distention but she is scheduled for a paracentesis in 2 days. This is her normally scheduled paracentesis. She follows with Dr. Steven Bird of hepatology. She denies numbness or weakness in hands or feet she denies urinary symptoms she denies fever    PCP: Sofy Dubose MD    Current Outpatient Medications   Medication Sig Dispense Refill    aspirin 81 mg chewable tablet Take 81 mg by mouth every other day.  hydroxyurea (HYDREA) 500 mg capsule Take 500 mg by mouth daily.  clopidogrel (PLAVIX) 75 mg tab Take 75 mg by mouth.  cholecalciferol (VITAMIN D3) 1,000 unit tablet Take 1,000 Units by mouth daily. Indications: not sure of dose      furosemide (LASIX) 40 mg tablet 1 tab po bid (Patient taking differently: 20 mg. 1 tab po bid) 60 Tab 0    spironolactone (ALDACTONE) 100 mg tablet Take  by mouth daily.          Past History     Past Medical History:  Past Medical History:   Diagnosis Date    Aortic stenosis     Arrhythmia  Breast CA ([facility] [address]     CAD (coronary artery disease)     cabg 20 yrs ago  2019 leaking heart valve to be repaired 6/26/19    Cancer ([facility] [address]     breast, rt    Cellulitis of left leg 06/28/2016    pt denies    Ill-defined condition     polocythemia vera    Liver disease     swollen liver-swollen sleen-hernia 2017    Thromboembolus ([facility] [address] 06/2016    DVT leg leg       Past Surgical History:  Past Surgical History:   Procedure Laterality Date    ABDOMEN SURGERY PROC UNLISTED      BREAST SURGERY PROCEDURE UNLISTED  2005    part. rt.mastectomy    CARDIAC SURG PROCEDURE UNLIST      2x bypass 20 years ago    CHEST SURGERY PROCEDURE UNLISTED      HX AORTIC VALVE REPLACEMENT  06/2019    HX CORONARY ARTERY BYPASS GRAFT  1996    HX HEENT      cataracts    IR BX TRANSCATHETER  3/22/2019       Family History:  Family History   Problem Relation Age of Onset    Migraines Maternal Grandmother     Cancer Mother     Cancer Father        Social History:  Social History     Tobacco Use    Smoking status: Never Smoker    Smokeless tobacco: Never Used   Substance Use Topics    Alcohol use: Yes     Alcohol/week: 0.8 standard drinks     Types: 1 Glasses of wine per week     Frequency: Never     Comment: 2 glasses per month    Drug use: No       Allergies:  No Known Allergies      Review of Systems   Review of Systems   All other systems reviewed and are negative.         Physical Exam     Vitals:    07/24/19 0646 07/24/19 1109   BP: 128/64 131/61   Pulse: 84 86   Resp: 16 15   Temp: 97.4 °F (36.3 °C) 97.2 °F (36.2 °C)   SpO2: 98%    Weight: 68.5 kg (151 lb)    Height: 5' 5\" (1.651 m)      Physical Exam    Nursing notes and vital signs reviewed    Constitutional: Non toxic appearing, no acute distress  Head: Normocephalic, Atraumatic  Eyes: Pupils are equal, round, and reactive to light, EOMI  Neck: Supple  Cardiovascular: Regular rate and rhythm  Chest: Normal work of breathing and chest excursion bilaterally  Lungs: Clear to ausculation bilaterally  Abdomen: Soft, non tender, distended, positive fluid wave, large reducible hernia. Back: No evidence of trauma or deformity  Extremities: No evidence of trauma or deformity, no LE edema  Skin: Warm and dry, normal cap refill  Neuro: Alert and appropriate, CN intact, normal speech, strength and sensation full and symmetric bilaterally,, normal coordination  Psychiatric: Normal mood and affect      Diagnostic Study Results     Labs -   No results found for this or any previous visit (from the past 12 hour(s)). Recent Results (from the past 24 hour(s))   OCCULT BLOOD, STOOL    Collection Time: 07/24/19  7:35 AM   Result Value Ref Range    Occult blood, stool NEGATIVE  NEG     CBC WITH AUTOMATED DIFF    Collection Time: 07/24/19  8:14 AM   Result Value Ref Range    WBC 35.4 (H) 4.6 - 13.2 K/uL    RBC 4.52 4.20 - 5.30 M/uL    HGB 10.5 (L) 12.0 - 16.0 g/dL    HCT 35.2 35.0 - 45.0 %    MCV 77.9 74.0 - 97.0 FL    MCH 23.2 (L) 24.0 - 34.0 PG    MCHC 29.8 (L) 31.0 - 37.0 g/dL    RDW 25.4 (H) 11.6 - 14.5 %    PLATELET 899 807 - 601 K/uL    NEUTROPHILS 77 (H) 42 - 75 %    BAND NEUTROPHILS 4 0 - 5 %    LYMPHOCYTES 6 (L) 20 - 51 %    MONOCYTES 2 2 - 9 %    EOSINOPHILS 1 0 - 5 %    BASOPHILS 1 0 - 3 %    METAMYELOCYTES 2 (H) 0 %    MYELOCYTES 6 (H) 0 %    PROMYELOCYTES 1 (H) 0 %    ABS. NEUTROPHILS 27.3 (H) 1.8 - 8.0 K/UL    ABS. LYMPHOCYTES 2.1 0.8 - 3.5 K/UL    ABS. MONOCYTES 0.7 0 - 1.0 K/UL    ABS. EOSINOPHILS 0.4 0.0 - 0.4 K/UL    ABS.  BASOPHILS 0.4 (H) 0.0 - 0.1 K/UL    RBC COMMENTS ANISOCYTOSIS  3+        RBC COMMENTS POIKILOCYTOSIS  2+        RBC COMMENTS OVALOCYTES  1+        RBC COMMENTS TEARDROP CELLS  2+        RBC COMMENTS HYPOCHROMIA  1+        WBC COMMENTS RBC FRAGMENTS      DF MANUAL     PROTHROMBIN TIME + INR    Collection Time: 07/24/19  8:14 AM   Result Value Ref Range    Prothrombin time 14.1 11.5 - 15.2 sec    INR 1.1 0.8 - 1.2     METABOLIC PANEL, COMPREHENSIVE    Collection Time: 07/24/19  8:14 AM   Result Value Ref Range    Sodium 143 136 - 145 mmol/L    Potassium 4.4 3.5 - 5.5 mmol/L    Chloride 110 100 - 111 mmol/L    CO2 26 21 - 32 mmol/L    Anion gap 7 3.0 - 18 mmol/L    Glucose 84 74 - 99 mg/dL    BUN 37 (H) 7.0 - 18 MG/DL    Creatinine 1.00 0.6 - 1.3 MG/DL    BUN/Creatinine ratio 37 (H) 12 - 20      GFR est AA >60 >60 ml/min/1.73m2    GFR est non-AA 54 (L) >60 ml/min/1.73m2    Calcium 8.4 (L) 8.5 - 10.1 MG/DL    Bilirubin, total 0.7 0.2 - 1.0 MG/DL    ALT (SGPT) 25 13 - 56 U/L    AST (SGOT) 46 (H) 10 - 38 U/L    Alk.  phosphatase 359 (H) 45 - 117 U/L    Protein, total 7.1 6.4 - 8.2 g/dL    Albumin 3.0 (L) 3.4 - 5.0 g/dL    Globulin 4.1 (H) 2.0 - 4.0 g/dL    A-G Ratio 0.7 (L) 0.8 - 1.7     LIPASE    Collection Time: 07/24/19  8:14 AM   Result Value Ref Range    Lipase 104 73 - 393 U/L   PTT    Collection Time: 07/24/19  8:14 AM   Result Value Ref Range    aPTT 40.1 (H) 23.0 - 36.4 SEC   URINALYSIS W/ RFLX MICROSCOPIC    Collection Time: 07/24/19  8:50 AM   Result Value Ref Range    Color YELLOW      Appearance CLEAR      Specific gravity 1.020 1.005 - 1.030      pH (UA) 5.0 5.0 - 8.0      Protein 100 (A) NEG mg/dL    Glucose NEGATIVE  NEG mg/dL    Ketone NEGATIVE  NEG mg/dL    Bilirubin NEGATIVE  NEG      Blood NEGATIVE  NEG      Urobilinogen 0.2 0.2 - 1.0 EU/dL    Nitrites NEGATIVE  NEG      Leukocyte Esterase NEGATIVE  NEG     URINE MICROSCOPIC ONLY    Collection Time: 07/24/19  8:50 AM   Result Value Ref Range    WBC 0 to 3 0 - 5 /hpf    RBC NONE 0 - 5 /hpf    Epithelial cells FEW 0 - 5 /lpf    Bacteria 1+ (A) NEG /hpf    Hyaline cast 0 to 2 0 - 2 /lpf    Granular cast 0 to 2 NEG /lpf   HGB & HCT    Collection Time: 07/24/19 10:10 AM   Result Value Ref Range    HGB 9.9 (L) 12.0 - 16.0 g/dL    HCT 34.0 (L) 35.0 - 45.0 %   CBC WITH AUTOMATED DIFF    Collection Time: 07/24/19  4:30 PM   Result Value Ref Range    WBC 39.3 (H) 4.6 - 13.2 K/uL    RBC 4.59 4.20 - 5.30 M/uL    HGB 11.1 (L) 12.0 - 16.0 g/dL    HCT 36.0 35.0 - 45.0 %    MCV 78.4 74.0 - 97.0 FL    MCH 24.2 24.0 - 34.0 PG    MCHC 30.8 (L) 31.0 - 37.0 g/dL    RDW 25.5 (H) 11.6 - 14.5 %    PLATELET 297 202 - 884 K/uL    NEUTROPHILS 83 (H) 42 - 75 %    BAND NEUTROPHILS 4 0 - 5 %    LYMPHOCYTES 5 (L) 20 - 51 %    MONOCYTES 3 2 - 9 %    EOSINOPHILS 4 0 - 5 %    BASOPHILS 0 0 - 3 %    METAMYELOCYTES 1 (H) 0 %    ABS. NEUTROPHILS 32.6 (H) 1.8 - 8.0 K/UL    ABS. LYMPHOCYTES 2.0 0.8 - 3.5 K/UL    ABS. MONOCYTES 1.2 (H) 0 - 1.0 K/UL    ABS. EOSINOPHILS 1.6 (H) 0.0 - 0.4 K/UL    ABS. BASOPHILS 0.0 0.0 - 0.1 K/UL    RBC COMMENTS ANISOCYTOSIS  2+        RBC COMMENTS SPHEROCYTES  1+        RBC COMMENTS SCHISTOCYTES  1+        RBC COMMENTS OVALOCYTES  1+        RBC COMMENTS TEARDROP CELLS  1+        DF MANUAL         Radiologic Studies -   No orders to display     CT Results  (Last 48 hours)    None        CXR Results  (Last 48 hours)    None          Medications given in the ED-  Medications   LORazepam (ATIVAN) tablet 0.5 mg (0.5 mg Oral Given 7/24/19 0739)         Medical Decision Making   I am the first provider for this patient. I reviewed the vital signs, available nursing notes, past medical history, past surgical history, family history and social history. Vital Signs-Reviewed the patient's vital signs. Pulse Oximetry Analysis - 98%% on RA     Records Reviewed: Nursing Notes, Old Medical Records, Previous Radiology Studies and Previous Laboratory Studies    Provider Notes (Medical Decision Making): Ju Brock is a 70 y.o. female with a history of prior TAVR, liver failure followed by Dr. Ivelisse Macias who presented today with rectal bleeding. Vital signs were stable on rectal exam patient had brown stool that was guaiac negative and one large hemorrhoid. I spoke to Dr. Ivelisse Macias who recommended evaluation by Dr. Hero Romero for possible scope. Dr. Hero Romero agreed to evaluate patient. And that we would trend hemoglobins. Given her recent TAVR I spoke to [facility] for possible transfer for evaluation current weight time for a bed was greater than 12 hours. Spoke to the inpatient provider who recommended if possible to continue care here due to the wait time, and that the patient had follow-up with her cardiac surgeon tomorrow. I spoke to the patient who preferred to stay at [facility]. Dr. Yaa Culp came down to the bedside and evaluated the patient. Agreed based on exam that bleeding source was likely the large hemorrhoid as patient was guaiac negative with brown stool and had not had additional episodes of bleeding in the department. Her hemoglobin remained stable vital check 11.1. Vital signs were stable patient and family were in agreement with plan patient was discharged home with follow-up with her cardiac surgeon tomorrow and Dr. Ute Santiago on Friday. Procedures:  Procedures        Diagnosis and Disposition     DISCHARGE NOTE:    Angelica Gomez  results have been reviewed with her. She has been counseled regarding her diagnosis, treatment, and plan. She verbally conveys understanding and agreement of the signs, symptoms, diagnosis, treatment and prognosis and additionally agrees to follow up as discussed. She also agrees with the care-plan and conveys that all of her questions have been answered. I have also provided discharge instructions for her that include: educational information regarding their diagnosis and treatment, and list of reasons why they would want to return to the ED prior to their follow-up appointment, should her condition change. She has been provided with education for proper emergency department utilization. CLINICAL IMPRESSION:    1. Rectal bleeding        PLAN:  1. D/C Home  2. Discharge Medication List as of 7/24/2019  5:25 PM        3.    Follow-up Information     Follow up With Specialties Details Why Contact Info    Abiola Anderson MD Internal Medicine Schedule an appointment as soon as possible for a visit in 1 week As needed Meghan Yeh  [phone]      Your cardiac surgeon at [facility]    Please follow-up with your cardiac surgeon at [facility] tomorrow at your previously scheduled appointment    Zoë Bird MD Hepatology, Liver Disease, Internal Medicine  Please follow-up with Dr. Murali Gutierrez Friday at your regularly scheduled appointment. [address]  [facility]      [facility] Emergency Medicine  If symptoms worsen, please return to this department with any concerning symptoms. With increased rectal bleeding or abdominal pain [address] [place] [id number]  [phone]        _______________________________      Please note that this dictation was completed with [employer], the computer voice recognition software. Quite often unanticipated grammatical, syntax, homophones, and other interpretive errors are inadvertently transcribed by the computer software. Please disregard these errors. Please excuse any errors that have escaped final proofreading.

## 2019-07-24 NOTE — ED NOTES
Attempted to start PIV on pt, pt became very anxious and stated she cannot tolerate needles and needs something \"for my nerves. \"     Pt A&O x 4, speaks in full/complete sentences to make needs known. Pt denies shortness of breath. Unable to start PIV at this time.

## 2019-07-24 NOTE — PROGRESS NOTES
Case discussed with Dr. Nikita Esteban. Pt has recent ARV replacement at [facility] -on aspirin and plavix, current gi bleeding with recent arv replacement on blood thinner. Pt will be benefit transferred back to [facility]. Pt was accepted per Rafi Rae per Nikita Poole.

## 2019-07-25 NOTE — CONSULTS
[address] [facility]    Name:  Mary Burger  MR#:   [id number]  :  1940  ACCOUNT #:  [de-identified]  DATE OF SERVICE:  2019      HISTORY OF PRESENT ILLNESS:  This is a 82-year-old female who came to the emergency room because she has been having rectal bleeding since yesterday 4 o'clock in the morning. She claims that she passed large amount of fresh blood through rectum; however, at the emergency room, her digital rectal exam showed only brown thin negative stools. The patient claims that she has been having diarrhea for the past at least year and half where she was having between 6 to 10 loose bowel movements mostly in the morning. Never at night. She claims that before that she used to have regular bowel movement every day. In the last three years, she has been diagnosed with polycythemia vera with portal hypertension and severe resistant ascites requiring paracentesis once a month. She is due to have her next in two more days. She denies having any melena. She also denies having any dyspepsia, heartburns, nausea, vomiting, or dysphagia. She claims that she has lost a lot of weight. Her abdomen is very distended and presently she is annoyed by a very large umbilical hernia. On top of polycythemia vera and cirrhosis and portal hypertension, she has coronary artery disease. She has aortic stenosis. She already had aortic valvuloplasty about a month ago. She has history of breast cancer. She had CABG 20 years ago and has a leaking valve, aortic valve repair on 2019. She has right lumpectomy. She had a history of deep vein thrombosis in the legs in 2016, partial right mastectomy, CABG 20 years ago, cataract surgery. ALLERGIES:  SHE HAS NO KNOWN DRUG ALLERGIES. SOCIAL HISTORY:  She does not smoke or abuse alcohol.     MEDICATIONS:  At home, she has been taking aspirin 81 mg, Hydrea 500 mg daily, Plavix 75, vitamin D3, Lasix 40 b.i.d., Aldactone 100 mg once a day. She has no chest pain. No shortness of breath, but does feel very uncomfortable from her distended abdomen. She has no dysuria or hematuria. She stays on low salt diet. There is no stroke, no paralysis, no diabetes. PHYSICAL EXAMINATION:  GENERAL:  We have a pleasant, emaciated, 72-year-old  female who appeared to be in no distress. She is alert and oriented. She answered well to all our questions. VITAL SIGNS:  She weighs only 151 pounds, despite her significant ascites. Temperature 97.4, pulse 86, breathing 16, saturation 98%, blood pressure 131/61. SKIN:  The skin is normal.  No evidence of any rash. HEENT:  Eyes are unremarkable. The pupils are equal and reactive to light. The sclerae are anicteric. The conjunctivae are relatively pink. The oropharyngeal cavity, she has dry and relatively pink mucous membrane. Normal teeth. NECK:  Supple. No palpable mass. LUNGS:  Clear to auscultation. HEART: The cardiac rhythm is regular. S1 and S2 normal.  There is I/VI systolic murmur at the base. She has a long mid sternotomy surgical scar. ABDOMEN:  It is very distended with positive fluid wave, large with use of old umbilical hernia measuring about a large orange or the size of the large orange. Bowel sounds are normal.  EXTREMITIES: Unremarkable. No pedal edema. No clubbing. No tremors. NEUROLOGIC:  No focal neurologic signs. She is alert and oriented. Moves all of her four extremities. LABORATORY DATA:  Blood test:  Hemoglobin on arrival was 10.5, but after hydration dropped to 9.9, it was 9.4 on 03/19/2019 with a slightly low MCH at 23.2 and this has been stable on comparison to before. Her platelets are 364 which is stable. WBC is 35, used to be 27.9 on 03/19/2019. She has 77% neutrophils and 6% lymphocytes. Normal coagulation except for PTT of 40.1. Complete metabolic panel: We have a BUN of 37, creatinine 1 and they were 27/ 1.16 on 03/19/2019.   LFTs are remarkable for AST of 46, alkaline phosphatase 359 and they were ?/ 187 in March. Lipase is 104. Ammonia was 35 on 03/17/2019. Occult blood in the stool and WBC in the stools were negative today and on 04/03/2018. CT scan of the abdomen and pelvis on 03/17/2019 talk about cirrhosis with hepatomegaly, portal hypertension, secondary skin ascites with abdominal distention, bilateral renal atrophy, colonic diverticulosis. ASSESSMENT AND PLAN:  In conclusion, this is a 68-year-old female who unfortunately has advanced cirrhosis of the liver probably from polycythemia vera with significant ascites resistant to diuretics requiring regular paracentesis almost once every month. She presently presents with huge ascites. I am not sure if she can even wait today to have it drained. This patient presents with rectal bleeding, which appears to be hemorrhoidal.  She already had a negative colonoscopy about 10 years ago. The occult blood in his stools and digital rectal exams were negative for any blood; therefore, I suspect a probably internal hemorrhoids and knowing that she has between 6 and 10 bowel movement every day. I did advise regarding hemorrhoids management, avoid staying too long on a toilet bowl and also avoid using regular paper tissue where she has to use baby wipes only. I told her in my opinion there is no need to do the endoscopy and also herself was not really very keen on having one. I did not repeat her digital rectal exam because it was already done. I explained to her that her hemorrhoids can be very large in the setting of portal hypertension and the management of the hemorrhoid is related to the portal hypertension. Unfortunately, surgery or even banding of the hemorrhoids are too risky in this setting. From my side, I will see her as needed. I told her to discuss with Dr. Kirstin Garg about the other options for management of her large volume ascites.   I advised her nevertheless to stay on low salt diet. The diarrhea is most likely related to the ascites itself where her colon is completely compressed. I told her there is no harm in trying the Metamucil and I explained to her that it may aggravate or even improve her diarrhea.       Yousif CULP MD      [id number]  D:  07/24/2019 16:34  T:  07/24/2019 22:16  JOB #:  [id number]  CC:  Hollie Gosselin, MD Jefrey Record, MD Jannetta Clay, MD

## 2019-07-26 NOTE — ROUTINE PROCESS
RLQ Paracentesis performed by Dr. Syed Price. Time out 1007. 7400 cc fluid drained. Fluid sent to lab for testing. Pt tolerated procedure well and without pain. Pt transported to care unit in stable condtition.

## 2019-07-26 NOTE — DISCHARGE INSTRUCTIONS
DISCHARGE SUMMARY from Nurse    PATIENT INSTRUCTIONS:    After general anesthesia or intravenous sedation, for 24 hours or while taking prescription Narcotics:  · Limit your activities  · Do not drive and operate hazardous machinery  · Do not make important personal or business decisions  · Do  not drink alcoholic beverages  · If you have not urinated within 8 hours after discharge, please contact your surgeon on call. Report the following to your surgeon:  · Excessive pain, swelling, redness or odor of or around the surgical area  · Temperature over 100.5  · Nausea and vomiting lasting longer than 4 hours or if unable to take medications  · Any signs of decreased circulation or nerve impairment to extremity: change in color, persistent  numbness, tingling, coldness or increase pain  · Any questions    What to do at Home:  Recommended activity: Activity as tolerated,   *  Please give a list of your current medications to your Primary Care Provider. *  Please update this list whenever your medications are discontinued, doses are      changed, or new medications (including over-the-counter products) are added. *  Please carry medication information at all times in case of emergency situations. These are general instructions for a healthy lifestyle:    No smoking/ No tobacco products/ Avoid exposure to second hand smoke  Surgeon General's Warning:  Quitting smoking now greatly reduces serious risk to your health. Obesity, smoking, and sedentary lifestyle greatly increases your risk for illness    A healthy diet, regular physical exercise & weight monitoring are important for maintaining a healthy lifestyle    You may be retaining fluid if you have a history of heart failure or if you experience any of the following symptoms:  Weight gain of 3 pounds or more overnight or 5 pounds in a week, increased swelling in our hands or feet or shortness of breath while lying flat in bed.   Please call your doctor as soon as you notice any of these symptoms; do not wait until your next office visit. The discharge information has been reviewed with the patient. The patient verbalized understanding. Discharge medications reviewed with the patient and appropriate educational materials and side effects teaching were provided. ___________________________________________________________________________________________________________________________________    PARACENTESIS DISCHARGE INSTRUCTIONS    General Information:  During this procedure, the doctor will insert a needle into the abdomen to drain fluid. After the procedure, you will be able to take a deep breath much easier. The site of the puncture may ooze the first day. This will decrease and eventually stop. Paracentesis (draining fluid from the abdomen) sometimes makes patients hypotensive (low blood pressure). Your doctor may order for you to receive fluids or albumin (a volume booster) during the procedure through an IV site. Home Care Instructions:  Keep the puncture site clean and dry. No tub baths or swimming until puncture site heals. Showering is acceptable. Resume your normal diet, and resume your normal activity slowly and as you tolerate. If you are short of breath, rest. If shortness of breath does not ease, please call your ordering doctor. Fluid can re-accumulate in the chest and/or in the abdomen. If this should occur, your doctor needs to know as you may need to have the procedure done again. Call If:     You should call your Physician and/or the Radiology Nurse if you notice any signs of infection, like pus draining, or if it is swollen or reddened. Also call if you have a fever, or if you are bleeding from the puncture site more than a small amount on the dressing. Call if the puncture site keeps draining fluid. Some oozing is to be expected, but should slow and then stop. Call if you feel like you have pressure in your abdomen.  SEEK IMMEDIATE CARE OR CALL 911 IF YOU SUDDENLY HAVE TROUBLE BREATHING, OR IF YOUR LIPS TURN BLUE, OR IF YOU NOTICE BLOOD IN YOUR SPUTUM. Follow-Up Instructions: Please see your ordering doctor as he/she has requested.      To Reach Us:        Date: 7/26/2019  Discharging Nurse: Abiola Gutierrez RN

## 2019-07-26 NOTE — PROCEDURES
POST PARACENTESIS PROCEDURE NOTE:    Procedure: US Guided Paracentesis    Pre-operative Diagnosis: Ascites    Post-operative Diagnosis: same    Indication: Ascites    Procedure Details: Standard aseptic technique. Ultrasound guidance. 1% lidocaine for local anesthesia. Estimated 7.4 L of ascites removed. Please see final report for full details. Specimen: 7.4 L ascites removed, sent to lab if requested         Complications:  None. EBL: Minimal.               Condition: Stable. Impression:  Successful paracentesis. Aris Jennings MD  Vascular & Interventional Radiology    [facility]     7/26/2019

## 2019-07-26 NOTE — PROGRESS NOTES
Son in, discharge inst given and  reviewed with pt and son, both verbalize understanding,  Pt discharged via wheelchair to car in care of son.   Pt denies any pain or distress at time of discharge,  Arm bands removed and shredded

## 2019-07-31 NOTE — PROGRESS NOTES
[address] Pamela Carrillo MD, Chun Archibald, Carlitos Valladares, MD Deny Morton Se, PA-C    Karen Randolph, [facility]     April JEANNE Francisco, [facility]   Wayne Tre, P[facility]    Jordan Keenan, [facility]    [address] [facility], [address] [place] [address], [facility] 22. [phone]    FAX: [address] [place] [address], [facility] - [address]    [phone]    FAX: [phone]       Patient Care Team:  Kelli Miramontes MD as PCP - General (Internal Medicine)  Jenniffer Alvarez MD (Oncology)  Shivam Batres MD (Surgery)  Letty Almanzar MD (Cardiology)      Problem List  Date Reviewed: 7/14/2019          Codes Class Noted    S/P AVR (aortic valve replacement) ICD-10-CM: Z95.2  ICD-9-CM: V43.3  7/24/2019        GI bleeding ICD-10-CM: K92.2  ICD-9-CM: 578.9  7/24/2019        Anemia due to acute blood loss ICD-10-CM: D62  ICD-9-CM: 285.1  7/24/2019        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  ICD-9-CM: 289.89  3/27/2019    Overview Signed 3/27/2019  8:08 PM by Isidra Hargrove MD     Of spleen and liver             Myelofibrosis ([facility] ICD-10-CM: U93.24  ICD-9-CM: 289.83  3/27/2019        Splenomegaly ICD-10-CM: R16.1  ICD-9-CM: 789.2  10/10/2018        Ascites ICD-10-CM: R18.8  ICD-9-CM: 789.59  4/24/2018        Aortic stenosis ICD-10-CM: I35.0  ICD-9-CM: 424.1  4/24/2018        Phlebitis and thrombophlebitis of femoral vein (deep) (superficial) ([facility] ICD-10-CM: I80.10  ICD-9-CM: 451.11  7/31/2016        Polycythemia vera ([facility] ICD-10-CM: D45  ICD-9-CM: 238.4  6/30/2016        Coronary artery disease involving native coronary artery ICD-10-CM: I25.10  ICD-9-CM: 414.01  6/30/2016              Brynn Ramirez returns to the [facility] [place] [facility] for management of ascites. The active problem list, all pertinent past medical history, medications, radiologic findings and laboratory findings related to the liver disorder were reviewed with the patient. The patient is a 70 y.o.  female who developed abdominal distention in 2017. 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 She is taking hydroxyurea for PVera. She developed ascites and underwent paracentesis in 7/2018 and has undergone repeated paracentesis every 1-2 months since then. Cytology has been negative for malignancy. The most recent cell count was negative for SBP. SAAG is elevated suggesting portal HTN. A liver biopsy was performed in 3/2019. There was no fibrosis or cirrhosis. There was extramedulary hematopoesis in the sinusoids. Since last office visit; The patient underwent TAVR in 6/2019. She was at THE [facility] ER on 7/26/2019 for rectal bleeding bleeding secondary to hemorrhoids. The patient currently notes significant abdominal swelling and currently feels miserable. She is also complaining of diarrhea about 6- 7 loose bowel movements daily. She stopped all diurectics several weeks ago as she states they were not working. The patient has not experienced pain in the right side over the liver, problems concentrating, swelling of the lower extremities, hematemesis, hematochezia. The patient has moderate  limitations in functional activities which can be attributed to the liver disease and to other medical problems that are not related to the liver disease. ASSESSMENT AND PLAN:  Ascites   The etiology for ascites is extramedulary hematopoesis with extensive hematologic precursor cells throughout the hepatic sinusoids causing sinusoidal obstructive syndrome.     The patient does not have cirrhosis    The patient underwent a transjugular liver biopsy with hepatic venous pressure measurements in 3/2019. Liver slides were reviewed and this demonstrate normal liver, no fibrosis, and extensive extramedulary hematopoesis throughout sinusoids. Hepatic vein pressures were RA 1, IVC 2, free hepatic vein 3, wedge hepatic vein 14. HVPG = 11. Ascites albumin in 3/2019 was 1.7 with SAAG = 1.4 consistent with portal HTN as the etiology for ascites. ECHO demonstrated mild pulmonary HTN but normal RV and only mild TR. Elastography demonstrated elevated liver stiffness. This is due to filling the sinusoids with hematology precursors and causing hepatic congestion from physiology that is consistent with outflow obstruction. Since the HVPG is elevated a TIPS could be considered, this could reduce portal pressure and improve ascites. We will schedule patient for TIPS and see if ascites will improve. Patient will have to decide if she wants to have the TIPS procedure  Meanwhile, we will continue paracentesis as needed. Aortic stenosis  ECHO showed tight aortic stenosis. This is likely the etiology for mild pulmonary HTN  TAVR was recently performed. Treatment of other medical problems in patients with chronic liver disease  There are no contraindications for the patient to take any medications that are necessary for treatment of other medical issues. The patient does not consume alcohol daily. Normal doses of acetaminophen can be used for pain as needed. Normal doses of acetaminophen as recommended on the label are not hepatotoxic, even in patients with cirrhosis. Counseling for alcohol in patients with chronic liver disease  The patient was counseled regarding alcohol consumption and the effect of alcohol on chronic liver disease. The patient does not consume any significant amount of alcohol. Vaccinations   The need for vaccination against viral hepatitis A and B will be assessed with serologic and instituted as appropriate.   Routine vaccinations against other bacterial and viral agents can be performed as indicated. Annual flu vaccination should be administered if indicated. ALLERGIES  No Known Allergies    MEDICATIONS  Current Outpatient Medications   Medication Sig    cholecalciferol (VITAMIN D3) 1,000 unit tablet Take 1,000 Units by mouth daily. Indications: not sure of dose    aspirin 81 mg chewable tablet Take 81 mg by mouth every other day.  hydroxyurea (HYDREA) 500 mg capsule Take 500 mg by mouth daily.  clopidogrel (PLAVIX) 75 mg tab Take 75 mg by mouth.  furosemide (LASIX) 40 mg tablet 1 tab po bid (Patient taking differently: 20 mg. 1 tab po bid)    spironolactone (ALDACTONE) 100 mg tablet Take  by mouth daily. No current facility-administered medications for this visit. SYSTEM REVIEW NOT RELATED TO LIVER DISEASE OR REVIEWED ABOVE:  Constitution systems: Negative for fever, chills, weight gain, weight loss. Eyes: Negative for visual changes. ENT: Negative for sore throat, painful swallowing. Respiratory: Negative for cough, hemoptysis, SOB. Cardiology: Negative for chest pain, palpitations. GI:  Negative for constipation or diarrhea. : Negative for urinary frequency, dysuria, hematuria, nocturia. Skin: Negative for rash. Hematology: Negative for easy bruising, blood clots. Musculo-skelatal: Negative for back pain, muscle pain, weakness. Neurologic: Negative for headaches, dizziness, vertigo, memory problems not related to HE. Psychology: Negative for anxiety, depression. PHYSICAL EXAMINATION:  Visit Vitals  /63 (BP 1 Location: Right arm, BP Patient Position: Sitting)   Pulse 84   Temp 98.4 °F (36.9 °C) (Tympanic)   Resp 16   Ht 5' 5.5\" (1.664 m)   Wt 147 lb (66.7 kg)   SpO2 94%   BMI 24.09 kg/m²     General: No acute distress. Chronically ill looking and cachectic. Eyes: Sclera anicteric. ENT: No oral lesions. Thyroid normal.  Nodes: No adenopathy. Skin: No spider angiomata. No jaundice.   No palmar erythema. Respiratory: Lungs clear to auscultation. Cardiovascular: Regular heart rate. No murmurs. No JVD. Abdomen: Distended with ascites. Umbilical hernia. Hepatomegaly. Splenomegaly. Extremities: No edema. Muscle wasting. Neurologic: Alert and oriented. Cranial nerves grossly intact. No asterixis. LABORATORY STUDIES:  Liver [place] of [address] Units 7/24/2019   WBC 4.6 - 13.2 K/uL 35.4 (H)   ANC 1.8 - 8.0 K/UL 27.3 (H)   HGB 12.0 - 16.0 g/dL 10.5 (L)   HGB 12 - 16 G/DL    HGB (iSTAT) 12 - 16 G/DL     - 420 K/uL 199   INR 0.8 - 1.2   1.1   AST 10 - 38 U/L 46 (H)   ALT 13 - 56 U/L 25   Alk Phos 45 - 117 U/L 359 (H)   Bili, Total 0.2 - 1.0 MG/DL 0.7   Albumin 3.4 - 5.0 g/dL 3.0 (L)   BUN 7.0 - 18 MG/DL 37 (H)   Creat 0.6 - 1.3 MG/DL 1.00   Na 136 - 145 mmol/L 143   K 3.5 - 5.5 mmol/L 4.4   Cl 100 - 111 mmol/L 110   CO2 21 - 32 mmol/L 26   Glucose 74 - 99 mg/dL 84     SEROLOGIES:  Not available or performed. Testing was performed today. LIVER HISTOLOGY:  3/2019. No inflammation, steatosis or fibrosis. Sinusoids are packed with hepatopoetic cells. ENDOSCOPIC PROCEDURES:  Not available or performed    RADIOLOGY:  9/2018. CT scan abdomen with IV contrast.  Normal appearing but enlarged liver. No liver mass lesions. Splenomegaly. Moderate ascites. OTHER TESTING:  10/2018. Bone marrow biopsy. Consistent with PVera, myeloproliferative disorder, myelofibrosis. 10/2018. ECHO heart. LVEF 61-65%. Moderate to severe AS. Mild to moderate PA HTN. Mild TR. FOLLOW-UP:  All of the issues listed above in the Assessment and Plan were discussed with the patient. All questions were answered. The patient expressed a clear understanding of the above. [facility] 87 1 week following TIPS.     Margaret Howe MD, MPH  [address]  [place]  22.  [phone]  BON Flora Singleton MD  I have personally interviewed and examined the patient during the encounter. I have explained the key findings related to the liver disease and other pertinent diagnoses as noted above to the patient and answered all questions. I have reviewed and agree with the findings documented above, including all diagnostic interpretations, and plans. I have edited the original note as appropriate for clarity.     MD Christel [address] [place] [address] 22.  [phone]  [address] [place] [address]

## 2019-07-31 NOTE — PROGRESS NOTES
Milly Wahl is a 70 y.o. female      1. Have you been to the ER, urgent care clinic or hospitalized since your last visit? YES. Patient has been to THE [facility] ED since last visit for bleeding rectum. 2. Have you seen or consulted any other health care providers outside of the [address] [place] [address] since your last visit (Include any pap smears or colon screening)?  NO          Learning Assessment 2/7/2019   PRIMARY LEARNER Patient   HIGHEST LEVEL OF EDUCATION - PRIMARY LEARNER  > 4 YEARS OF COLLEGE   BARRIERS PRIMARY LEARNER NONE   CO-LEARNER CAREGIVER No   PRIMARY LANGUAGE ENGLISH   LEARNER PREFERENCE PRIMARY LISTENING   ANSWERED BY patient   RELATIONSHIP SELF

## 2019-07-31 NOTE — ACP (ADVANCE CARE PLANNING)
Do you have an Advanced Directive? yes    Would you like information on Advanced Directives? NO      Was information provided?  NO

## 2019-07-31 NOTE — Clinical Note
8/30/19 Patient: Hansel Fuller YOB: 1940 Date of Visit: 7/31/2019 Babs Marvin MD 
[address] [facility] [address] [place] [address] VIA In Basket Dear Babs Marvin MD, Thank you for referring Ms. Hansel Fuller to [address] [place] [address],[address] for evaluation. My notes for this consultation are attached. If you have questions, please do not hesitate to call me. I look forward to following your patient along with you. Sincerely, Dangelo Go MD

## 2019-08-07 NOTE — PROGRESS NOTES
PT aware of need to hold anticoagulants per protocol. Patient is calling oncologist for clearance to hold asa 81mg prior to procedure. PT aware of arrival time pre procedure. Will arrive for consult at 1330 for 1400 consult. Pt states no questions at this time. Patient has number to call with any questions or concerns.

## 2019-08-09 PROBLEM — K92.2 LOWER GI BLEED: Status: ACTIVE | Noted: 2019-01-01

## 2019-08-09 NOTE — PROGRESS NOTES
.  PT aware of arrival time pre procedure. Will arrive at [phone] for 1400 consult appt. Also aware of tenative TIPs appt if approved. Pt states no questions at this time.

## 2019-08-09 NOTE — PROGRESS NOTES
Patient had a uneventful shift. Monitor H/H. No signs of bleeding or reported bloody stools since admission. Completed NM GI bleed scan this evening, results pending.

## 2019-08-09 NOTE — PROGRESS NOTES
Bedside and Verbal shift change report given to ANNAMARIA Tariq (oncoming nurse) by Mary Kate Dutta (offgoing nurse). Report included the following information SBAR, Kardex, Intake/Output, MAR and Recent Results.

## 2019-08-09 NOTE — H&P
History & Physical    Patient: Chantell Dias MRN: [id number]  CSN: [id number]    YOB: 1940  Age: 70 y.o. 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 Last night bleeding intensified. Bright red blood. Directly admitted from Dr. Sue Moreno clinic. Discussed benji with Dr. Korina Logan. Recommends nuc med bleeding scan to assess for active bleedingand to localize. As going out of town [facility], requests that we reach out ot Dr. Denice Blake who will be covering GI.  Mrs. Conni Carrel will likely need a c-scope within the next few days. Lower gi bleeding. P vera with massive splenomegaly  S/p TAVR for Aortic Stenosis. Recurrent ascites 2/2 p.vera not cirrhosis. -Admit.  -Assess hgb. -Clear diet. -Nuc med gi bleeding scan. -GI consult tomorrow to set up c-scope. -Pharma dvt px contraindicated given bleeding.  -Dispo TBD. CC: blood in stool. HPI:     Chantell Dias is a 70 y.o. female who I was asked to admit directly by Dr. Sharmaine Boas. Came into the clinic c/o fatigue, malaise, and multiple bouts of lower gi bleeding. Complicated medical hisotry. Recently had aortic valve replacement. TAVR. Under treatement by Dr. Mark Aleman for her Antonina Kenney with massive splenomegaly.       Past Medical History:   Diagnosis Date    Aortic stenosis     Arrhythmia  Breast CA ([facility]     CAD (coronary artery disease)     cabg 20 yrs ago  2019 leaking heart valve to be repaired 6/26/19    Cancer ([facility] [address] [facility]     breast, rt    Cellulitis of left leg 06/28/2016    pt denies    Ill-defined condition     polocythemia vera    Liver disease     swollen liver-swollen sleen-hernia 2017    Thromboembolus ([facility] [address] [facility] 06/2016    DVT leg leg       Past Surgical History:   Procedure Laterality Date    BREAST SURGERY PROCEDURE UNLISTED  2005    part. rt.mastectomy    CARDIAC SURG PROCEDURE UNLIST      2x bypass 20 years ago    CHEST SURGERY PROCEDURE UNLISTED      HX AORTIC VALVE REPLACEMENT  06/2019    HX CORONARY ARTERY BYPASS GRAFT  1996    HX HEENT      cataracts    IR BX TRANSCATHETER  3/22/2019       Family History   Problem Relation Age of Onset    Migraines Maternal Grandmother     Cancer Mother     Cancer Father        Social History     Socioeconomic History    Marital status:      Spouse name: Not on file    Number of children: Not on file    Years of education: Not on file    Highest education level: Not on file   Tobacco Use    Smoking status: Never Smoker    Smokeless tobacco: Never Used   Substance and Sexual Activity    Alcohol use: Yes     Alcohol/week: 0.8 standard drinks     Types: 1 Glasses of wine per week     Frequency: Never     Comment: 2 glasses per month    Drug use: No    Sexual activity: Not Currently   Other Topics Concern       Prior to Admission medications    Medication Sig Start Date End Date Taking? Authorizing Provider   cholecalciferol (VITAMIN D3) 1,000 unit tablet Take 1,000 Units by mouth daily. Indications: not sure of dose    Provider, Historical   aspirin 81 mg chewable tablet Take 81 mg by mouth every other day. Provider, Historical   hydroxyurea (HYDREA) 500 mg capsule Take 500 mg by mouth daily. Provider, Historical       No Known Allergies    Review of Systems  Gen: No fever, chills, malaise, weight loss/gain.    Heent: No headache, rhinorrhea, epistaxis, ear pain, hearing loss, sinus pain, neck pain/stiffness, sore throat. Heart: No chest pain, palpitations, LANGLEY, pnd, or orthopnea. Resp: No cough, hemoptysis, wheezing and shortness of breath. GI: see hpi. Appetite preserved. : No urinary obstruction, dysuria or hematuria. Derm: No rash, new skin lesion or pruritis. Musc/skeletal: no bone or joint complains. Vasc: No edema, cyanosis or claudication. Endo: No heat/cold intolerance, no polyuria,polydipsia or polyphagia. Neuro: No unilateral weakness, numbness, tingling. No seizures. Heme: No easy bruising or bleeding. Physical Exam:     Physical Exam:  Visit Vitals  /59 (BP 1 Location: Left arm, BP Patient Position: Head of bed elevated (Comment degrees))   Pulse 84   Temp 98.1 °F (36.7 °C)   Resp 16   Ht 5' 5.5\" (1.664 m)   Wt 69.2 kg (152 lb 8 oz)   SpO2 99%   BMI 24.99 kg/m²      O2 Device: Room air    Temp (24hrs), Av.1 °F (36.7 °C), Min:98.1 °F (36.7 °C), Max:98.1 °F (36.7 °C)    No intake/output data recorded. No intake/output data recorded. General:  Awake, cooperative, no distress. Head:  Normocephalic, without obvious abnormality, atraumatic. Eyes:  Conjunctivae/corneas clear, sclera anicteric, PERRL, EOMs intact. Nose: Nares normal. No drainage or sinus tenderness. Throat: Lips, mucosa, and tongue normal.    Neck: Supple, symmetrical, trachea midline, no adenopathy. Lungs:   Clear to auscultation bilaterally. Heart:  Regular rate and rhythm, S1, S2 normal, no murmur, click, rub or gallop. Abdomen: Soft, bs+, ascites present. Non tender   Extremities: Extremities normal, atraumatic, no cyanosis or edema. Capillary refill normal.   Pulses: 2+ and symmetric all extremities. Skin: Skin color pink, turgor normal. No rashes or lesions   Neurologic: CNII-XII intact. No focal motor or sensory deficit. Labs Reviewed:     All lab results for the last 24 hours reviewed. No results found for this or any previous visit (from the past 24 hour(s)).       CC: Kelli Miramontes MD

## 2019-08-10 NOTE — PHYSICIAN ADVISORY
Letter of Status Determination:   Recommend hospitalization status   INPATIENT Status is appropriate     Pt Name:  Milly Wahl   MR#   [address] [facility] # [id number] /  [id number]  Payor: Lucy Peacock / Plan: Danna Ford / Product Type: Medicare /    FROILAN#  [id number]   Room and Hospital  332/01  @ [facility]   Hospitalization date  8/9/2019  1:16 PM   Current Attending Physician  Kirstin Caal MD   Principal diagnosis  Lower GI bleed [K92.2]     Clinicals  70 y.o. y.o  female hospitalized with lower GI bleed and one point drop in Hemoglobin overnight. Evaluated by GI planned for EGD and colonoscopy tomorrow. STATUS DETERMINATION  This patient is at high risk of adverse events and deterioration based on documented clinical data, comorbid conditions and current acute care course. Ms. Milly Wahl is expected to meet Inpatient Admission status criteria in accordance with CMS regulation Section 43 .3. Specifically, due to medical necessity the patient's stay is expected to exceed Two Midnights. It is our recommendation that this patient's current hospitalization  INPATIENT status is appropriate    The final decision of the patient's hospitalization status depends on the attending physician's judgment. Additional comments     Payor: Lucy Peacock / Plan: [place] MEDICARE PART A & B / Product Type: Medicare /           Josiah Peters MD  Physician [address]  Carey Baxter, [address] [place] [address]. [email or web address] [address]

## 2019-08-10 NOTE — PROGRESS NOTES
900 [facility] care of patient from Jeferson Lugo RN. Patient resting comfortably in bed.    0754 Assessment complete. Patient denies chest pain or SOB. Call bell in reach. No signs of active bleeding. 0900 Patient stated DNR on file. DNR ordered per Dr. Kj Valdovinos.    1200 Reassessment complete. Patient resting comfortably in chair watching tv. No complaints of pain or SOB. No signs of active bleeding. Call rodriguez is in reach. Dr. Jeffrey Lu ordered bowel prep for procedure tomorrow morning. Patient to complete bowel prep by 0500 per Dr. Jeffrey Lu. 1430 Bedside and Verbal shift change report given to Lovely Madrid RN (oncoming nurse) by Jeremy Toledo RN (offgoing nurse). Report included the following information SBAR, Kardex, Intake/Output, MAR and Recent Results.

## 2019-08-10 NOTE — PROGRESS NOTES
Assumed pt care. Received SBAR report from [facility]. Pt in bed watching TV at this time. Pt white board updated. Bowel prep to begin at 1800. Needs to be refrigerated w/  NPO at midnight except for Bowel prep.  requests bowel prep to be completed by 0500. Endoscopy and Colonoscopy tomorrow morning. Pt ordering clear liquid lunch. Pt has no complains or requests at this time. Call bell, phone, and personal items w/in pt reach. Bed locked in lowest position. Will continue to monitor. 1524 Reassessment complete. 1630 COLYTE mixed, labeled and placed in refrigerator. 1847 COLYTE bowel prep started. Pt educated on bowel prep procedure. Pt and oncoming RN informed bowel prep needs to be completed by 0500. Briefs, wipes, gowns, pads, Crystal lite in pt room. COLYTE labeled and placed in refrigerator. Bedside and Verbal shift change report given to Donna Campbell RN by Pearl Toledo RN. Report included the following information SBAR, Kardex, OR Summary, Intake/Output and MAR.

## 2019-08-10 NOTE — ROUTINE PROCESS
0700 Bedside shift change report given to Colt Walters RN (oncoming nurse) by Iván kimball. Darian Ball RN (offgoing nurse). Report included the following information SBAR and Kardex.

## 2019-08-10 NOTE — PROGRESS NOTES
Hospitalist Progress Note    Patient: Law Garcia MRN: [id number]  CSN: [id number]    YOB: 1940  Age: 70 y.o. Sex: female    DOA: 8/9/2019 LOS:  LOS: 1 day            Assessment/Plan     Principal Problem:    Lower GI bleed (8/9/2019)    Active Problems:    Polycythemia vera ([address] (6/30/2016)      Splenomegaly (10/10/2018)      Extramedullary hematopoiesis (3/27/2019)      Overview: Of spleen and liver      S/P AVR (aortic valve replacement) (7/24/2019)      Anemia due to acute blood loss (7/24/2019)      Lower gi bleeding: No bleeding since early am. Pt has been having intermittent multiple bouts of BRBPR for 3 wks with her last ER visit on 07/24. However her AS was severe and endoscopy and colonoscopy were postponed for valve replacement. Dicussed the case with GI/Dr. Todd Carrasco who will see the pt today. Nuc med gi bleeding scan revealed with no active bleeding. Anemia: 2nd to 1. P vera with massive splenomegaly: Ongoing. Seen and f/u by Dr. Venessa Carver    S/p TAVR for Aortic Stenosis. Recurrent ascites 2/2 p.vera not cirrhosis: Paracentesis prn/      Clear diet. NPO this pm     DNR    Dispo: TBD    Long discussion with the pt regarding herDx. medicines, hospital course. We reviewed and discussed assessment of condition and went over plans of care. Questions answered. Total time 45 min's, including more than 50% on discussion with family and coordinating care. CC:  A 69 y/o female with on and off lower gi bleeding over the past three weeks. Subjective:     Pt was seen and examined with the nurse in the morning round. No active bleeding since this am. Wants to go home. Review of systems  General: No fevers or chills. Cardiovascular: No chest pain or pressure. No palpitations. Pulmonary: No cough, SOB  Gastrointestinal: No nausea, vomiting.      Objective:      Visit Vitals  /69   Pulse 84   Temp 98 °F (36.7 °C)   Resp 14   Ht 5' 5.5\" (1.664 m)   Wt 68.9 kg (152 lb)   SpO2 96%   Breastfeeding? No   BMI 24.91 kg/m²       Physical Exam:    Gen: NAD, Frail   Heent:  MMM, NC, AT. Cor: s1s2 RRR. No MRG. PMI mid 5th intercostal space. Resp:  CTA b/l. No w/r/r. Nml effort and diaphragmatic excursion. Abd: + distended. NT ND.  BS positive. + ascites and splenomegaly and umbilical hernia   Ext: No edema or cyanosis. Intake and Output:  Current Shift:  No intake/output data recorded. Last three shifts:  No intake/output data recorded. Labs: Results:       Chemistry Recent Labs     08/09/19  1430   GLU 87      K 5.1      CO2 27   BUN 37*   CREA 1.19   CA 8.2*   AGAP 8   BUCR 31*   *   TP 7.5   ALB 3.2*   GLOB 4.3*   AGRAT 0.7*      CBC w/Diff Recent Labs     08/10/19  0245 08/09/19  1430   WBC  --  32.4*   RBC  --  4.65   HGB 9.4* 10.7*   HCT 31.3* 36.2   PLT  --  194   GRANS  --  65   LYMPH  --  2*   EOS  --  5      Cardiac Enzymes No results for input(s): CPK, CKND1, ANNIE in the last 72 hours. No lab exists for component: CKRMB, TROIP   Coagulation Recent Labs     08/09/19  1430   PTP 14.3   INR 1.1       Lipid Panel Lab Results   Component Value Date/Time    Cholesterol, total 116 11/29/2018 07:40 AM    HDL Cholesterol 35 (L) 11/29/2018 07:40 AM    LDL, calculated 67.2 11/29/2018 07:40 AM    VLDL, calculated 13.8 11/29/2018 07:40 AM    Triglyceride 69 11/29/2018 07:40 AM    CHOL/HDL Ratio 3.3 11/29/2018 07:40 AM      BNP No results for input(s): BNPP in the last 72 hours.    Liver Enzymes Recent Labs     08/09/19  1430   TP 7.5   ALB 3.2*   *   SGOT 43*      Thyroid Studies No results found for: T4, T3U, TSH, TSHEXT     Procedures/imaging: see electronic medical records for all procedures/Xrays and details which were not copied into this note but were reviewed prior to creation of Plan      Medications Reviewed  Hermelinda Cuadra MD

## 2019-08-10 NOTE — PROGRESS NOTES
Problem: Patient Education: Go to Patient Education Activity  Goal: Patient/Family Education  Outcome: Progressing Towards Goal     Problem: Upper and Lower GI Bleed: Day 1  Goal: Off Pathway (Use only if patient is Off Pathway)  Outcome: Progressing Towards Goal  Goal: Activity/Safety  Outcome: Progressing Towards Goal  Goal: Consults, if ordered  Outcome: Progressing Towards Goal  Goal: Diagnostic Test/Procedures  Outcome: Progressing Towards Goal  Goal: Nutrition/Diet  Outcome: Progressing Towards Goal  Goal: Discharge Planning  Outcome: Progressing Towards Goal  Goal: Medications  Outcome: Progressing Towards Goal  Goal: Respiratory  Outcome: Progressing Towards Goal  Goal: Treatments/Interventions/Procedures  Outcome: Progressing Towards Goal  Goal: Psychosocial  Outcome: Progressing Towards Goal  Goal: *Optimal pain control at patient's stated goal  Outcome: Progressing Towards Goal  Goal: *Hemodynamically stable  Outcome: Progressing Towards Goal  Goal: *Demonstrates progressive activity  Outcome: Progressing Towards Goal     Problem: Anemia Care Plan (Adult and Pediatric)  Goal: *Labs within defined limits  Outcome: Progressing Towards Goal  Goal: *Tolerates increased activity  Outcome: Progressing Towards Goal

## 2019-08-10 NOTE — PROGRESS NOTES
Problem: Patient Education: Go to Patient Education Activity  Goal: Patient/Family Education  Outcome: Progressing Towards Goal     Problem: Upper and Lower GI Bleed: Day 1  Goal: Off Pathway (Use only if patient is Off Pathway)  Outcome: Progressing Towards Goal  Goal: Activity/Safety  Outcome: Progressing Towards Goal  Goal: Consults, if ordered  Outcome: Progressing Towards Goal  Goal: Diagnostic Test/Procedures  Outcome: Progressing Towards Goal  Goal: Nutrition/Diet  Outcome: Progressing Towards Goal  Goal: Discharge Planning  Outcome: Progressing Towards Goal  Goal: Medications  Outcome: Progressing Towards Goal  Goal: Respiratory  Outcome: Progressing Towards Goal  Goal: Treatments/Interventions/Procedures  Outcome: Progressing Towards Goal  Goal: Psychosocial  Outcome: Progressing Towards Goal  Goal: *Optimal pain control at patient's stated goal  Outcome: Progressing Towards Goal  Goal: *Hemodynamically stable  Outcome: Progressing Towards Goal  Goal: *Demonstrates progressive activity  Outcome: Progressing Towards Goal

## 2019-08-10 NOTE — PROGRESS NOTES
Reason for Admission:   Chart reviewed as CM on call; patient is a 70 yr old female who presented with c/o fatigue, malaise and multiple cases of lower GI bleeding; recent AVR; medical hx of polycythemia vera, CAD, ascites, aortic stenosis, splenomegaly and anemia. RRAT Score: Moderate; 16           Do you (patient/family) have any concerns for transition/discharge?     none              Plan for utilizing home health:   none  Current Advanced Directive/Advance Care Plan:  DNR            Transition of Care Plan:      Met with patient at bedside; she states she lives w/ 43 yr old son who moved in with her; does not use DME or HH services; no discharge needs anticipated at this time but CM available as needed.     Care Management Interventions  PCP Verified by CM: Yes(Dr. Dorie Dugan)  Last Visit to PCP: 08/09/19  Mode of Transport at Discharge: (son to transport home)  Current Support Network: Own Home(apurva Manzo lives w/ her)  Confirm Follow Up Transport: Family  Plan discussed with Pt/Family/Caregiver: Yes  Discharge Location  Discharge Placement: Home

## 2019-08-10 NOTE — CONSULTS
Gastroenterology Consult     Patient: Pablo Gonzales MRN: [id number]  CSN: [id number]    YOB: 1940  Age: 70 y.o. Sex: female    DOA: 8/9/2019 LOS:  LOS: 1 day        Requesting Physician: Dr. Letty Martel  Reason for Consultation: bright red blood from rectum          HPI:    70y.o. year old Female was referred for bright red blood from rectum. Associated symptoms of none. Started Thursday night with multiple bout of BRBPR. Admitted Friday night. No blood since. However patient also presented to the ED in July with multiple bouts of BRBPR. However her AS was severe and endoscopy was postponed for valve replacement. She currently denies SOB, chest pain, abdominal pain with exception of pressure from her ascites. No diarrhea/constipation. 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 The patient does not have cirrhosis. ECHO demonstrated mild pulmonary HTN but normal RV and only mild TR. Elastography demonstrated elevated liver stiffness. This is due to filling the sinusoids with hematology precursors and causing hepatic congestion from physiology that is consistent with outflow obstruction. Review of Systems:    A comprehensive review of systems was negative except for that written in the History of Present Illness.     Past Medical History:   Diagnosis Date    Aortic stenosis     Arrhythmia     Breast CA ([address]     CAD (coronary artery disease)     cabg 20 yrs ago  2019 leaking heart valve to be repaired 6/26/19    Cancer ([address]     breast, rt    Cellulitis of left leg 06/28/2016    pt denies    Ill-defined condition     polocythemia vera    Liver disease     swollen liver-swollen sleen-hernia 2017    Thromboembolus ([address] 06/2016    DVT leg leg      Home medications  Vit D  ASA 81mg daily  Hydroxyurea    Current Facility-Administered Medications:     acetaminophen (TYLENOL) tablet 650 mg, 650 mg, Oral, Q4H PRN, Nannette Cope MD    No Known Allergies    Past Surgical History:   Procedure Laterality Date    BREAST SURGERY PROCEDURE UNLISTED  2005    part. rt.mastectomy    CARDIAC SURG PROCEDURE UNLIST      2x bypass 20 years ago    CHEST SURGERY PROCEDURE UNLISTED      HX AORTIC VALVE REPLACEMENT  06/2019    HX CORONARY ARTERY BYPASS GRAFT  1996    HX HEENT      cataracts    IR BX TRANSCATHETER  3/22/2019       Social History     Socioeconomic History    Marital status:      Spouse name: Not on file    Number of children: Not on file    Years of education: Not on file    Highest education level: Not on file   Tobacco Use    Smoking status: Never Smoker    Smokeless tobacco: Never Used   Substance and Sexual Activity    Alcohol use: Yes     Alcohol/week: 0.8 standard drinks     Types: 1 Glasses of wine per week     Frequency: Never     Comment: 2 glasses per month    Drug use: No    Sexual activity: Not Currently   Other Topics Concern       Family History   Problem Relation Age of Onset    Migraines Maternal Grandmother     Cancer Mother     Cancer Father        Visit Vitals  /59   Pulse 82   Temp 97.6 °F (36.4 °C)   Resp 15   Ht 5' 5.5\" (1.664 m)   Wt 68.9 kg (152 lb)   SpO2 98%   Breastfeeding? No   BMI 24.91 kg/m²       Physical Exam   Constitutional: She is oriented to person, place, and time and well-developed, well-nourished, and in no distress. HENT:   Head: Normocephalic and atraumatic. Mouth/Throat: Oropharynx is clear and moist.   mallampati III     Eyes: Pupils are equal, round, and reactive to light. Conjunctivae and EOM are normal.   Neck: Normal range of motion. Neck supple. No tracheal deviation present. Cardiovascular: Normal rate, regular rhythm, normal heart sounds and intact distal pulses. Exam reveals no gallop and no friction rub.    No murmur heard.  Pulmonary/Chest: Effort normal and breath sounds normal. No respiratory distress. She has no wheezes. She has no rales. She exhibits no tenderness. Abdominal: Bowel sounds are normal. She exhibits distension and ascites. She exhibits no mass. There is no tenderness. There is no rebound and no guarding. A hernia is present. Hernia confirmed positive in the umbilical area. Musculoskeletal: Normal range of motion. She exhibits edema. She exhibits no tenderness or deformity. Lymphadenopathy:     She has no cervical adenopathy. Neurological: She is alert and oriented to person, place, and time. No cranial nerve deficit. Gait normal.   Skin: Skin is warm and dry. Numerous ecchymosis   Psychiatric: Mood, memory, affect and judgment normal.            Recent Results (from the past 24 hour(s))   CBC WITH AUTOMATED DIFF    Collection Time: 08/09/19  2:30 PM   Result Value Ref Range    WBC 32.4 (H) 4.6 - 13.2 K/uL    RBC 4.65 4.20 - 5.30 M/uL    HGB 10.7 (L) 12.0 - 16.0 g/dL    HCT 36.2 35.0 - 45.0 %    MCV 77.8 74.0 - 97.0 FL    MCH 23.0 (L) 24.0 - 34.0 PG    MCHC 29.6 (L) 31.0 - 37.0 g/dL    RDW 25.2 (H) 11.6 - 14.5 %    PLATELET 460 727 - 421 K/uL    NEUTROPHILS 65 42 - 75 %    BAND NEUTROPHILS 13 (H) 0 - 5 %    LYMPHOCYTES 2 (L) 20 - 51 %    MONOCYTES 3 2 - 9 %    EOSINOPHILS 5 0 - 5 %    BASOPHILS 0 0 - 3 %    METAMYELOCYTES 8 (H) 0 %    MYELOCYTES 4 (H) 0 %    NRBC 2.0 (H) 0  WBC    ABS. NEUTROPHILS 21.1 (H) 1.8 - 8.0 K/UL    ABS. LYMPHOCYTES 0.6 (L) 0.8 - 3.5 K/UL    ABS. MONOCYTES 1.0 0 - 1.0 K/UL    ABS. EOSINOPHILS 1.6 (H) 0.0 - 0.4 K/UL    ABS.  BASOPHILS 0.0 0.0 - 0.1 K/UL    RBC COMMENTS ANISOCYTOSIS  1+        DF MANUAL     METABOLIC PANEL, COMPREHENSIVE    Collection Time: 08/09/19  2:30 PM   Result Value Ref Range    Sodium 140 136 - 145 mmol/L    Potassium 5.1 3.5 - 5.5 mmol/L    Chloride 105 100 - 111 mmol/L    CO2 27 21 - 32 mmol/L    Anion gap 8 3.0 - 18 mmol/L    Glucose 87 74 - 99 mg/dL    BUN 37 (H) 7.0 - 18 MG/DL    Creatinine 1.19 0.6 - 1.3 MG/DL    BUN/Creatinine ratio 31 (H) 12 - 20      GFR est AA 53 (L) >60 ml/min/1.73m2    GFR est non-AA 44 (L) >60 ml/min/1.73m2    Calcium 8.2 (L) 8.5 - 10.1 MG/DL    Bilirubin, total 0.7 0.2 - 1.0 MG/DL    ALT (SGPT) 25 13 - 56 U/L    AST (SGOT) 43 (H) 10 - 38 U/L    Alk. phosphatase 349 (H) 45 - 117 U/L    Protein, total 7.5 6.4 - 8.2 g/dL    Albumin 3.2 (L) 3.4 - 5.0 g/dL    Globulin 4.3 (H) 2.0 - 4.0 g/dL    A-G Ratio 0.7 (L) 0.8 - 1.7     PROTHROMBIN TIME + INR    Collection Time: 08/09/19  2:30 PM   Result Value Ref Range    Prothrombin time 14.3 11.5 - 15.2 sec    INR 1.1 0.8 - 1.2     HGB & HCT    Collection Time: 08/10/19  2:45 AM   Result Value Ref Range    HGB 9.4 (L) 12.0 - 16.0 g/dL    HCT 31.3 (L) 35.0 - 45.0 %       Radiology:  none    Assessment & Plan:    Hospital Problems  Date Reviewed: 8/9/2019          Codes Class Noted POA    * (Principal) Lower GI bleed ICD-10-CM: K92.2  ICD-9-CM: 578.9  8/9/2019 Yes        S/P AVR (aortic valve replacement) ICD-10-CM: Z95.2  ICD-9-CM: V43.3  7/24/2019 Yes        Anemia due to acute blood loss ICD-10-CM: D62  ICD-9-CM: 285.1  7/24/2019 Yes        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 Patient also with bleeding from rectum 7/19 at ED visit. This admission with decrease in H/H from 10.7 to 9.4. Pt not on fluids. plt 194, INR 1.1. BUN elevated 37 with Cr 1.19. Not on blood thinners. Colonoscopy reportedly 10 years ago.     Bleeding could be from most likely lower source - AVM associated with previous AS, versus colon cancer/polyps, vs hemorrhoidal given portal HTN, versus other. Less likely upper GI bleed given stable VS however pt with increase BUN and risk factors of portal HTN. 1.  GI bleeding  - clear liquid diet  - EGD/COLO to eval.  Benefits/risks/alternatives discussed  - Golytely prep    2. Ascites  - pt with planned TIPS for 29th of Aug however has re-accumulation of fluid and will need repeat paracentesis prior  -Currently with SOB  - please schedule outpatient IR appointment, pt followed at [facility] IR for paracentesis.     3.  Large umbilical hernia   - not strangulated  - will monitor    Richie Gary MD  8/10/2019

## 2019-08-11 NOTE — ROUTINE PROCESS
TRANSFER - IN REPORT:    Verbal report received from [address] [place] [address] RN(name) on Gabi Ireland  being received from OR(unit) for routine progression of care      Report consisted of patients Situation, Background, Assessment and   Recommendations(SBAR). Information from the following report(s) SBAR, Kardex, OR Summary, Procedure Summary and Intake/Output was reviewed with the receiving nurse. Opportunity for questions and clarification was provided. Assessment completed upon patients arrival to unit and care assumed.

## 2019-08-11 NOTE — ANESTHESIA POSTPROCEDURE EVALUATION
Post-Anesthesia Evaluation & Assessment Visit Vitals /62 Pulse 88 Temp 36.1 °C (97 °F) Resp 12 Ht 5' 5.5\" (1.664 m) Wt 68.9 kg (152 lb) SpO2 96% Breastfeeding? No  
BMI 24.91 kg/m² Pt did well with procedure and ready to transport to floor in stable condition. Nausea/Vomiting: Controlled. Post-operative hydration adequate. Pain Scale 1: Visual (08/11/19 0315) Pain Intensity 1: 0 (08/11/19 0315) Managed Pain score at or below stated goal level. Mental status & Level of consciousness: alert and oriented x 3 Neurological status: moves all extremities, sensation grossly intact Pulmonary status: airway patent, adequate oxygenation. Complications related to anesthesia: none Patient has met all PACU discharge requirements.  
 
 
Ezio Signs, DO

## 2019-08-11 NOTE — PROGRESS NOTES
1915: Assumed patient care from [place] [id number]. Patient is alert and oriented to person, place, time and situation. Respiratory status is stable on room air. Vital signs are stable. MEWS score is a one. Patient denies any pain, discomfort, nausea vomiting dizziness or anxiety. White board and fall card is updated. Bed is locked and in lowest position. Call bell, water and personal belongings are within reach. Patient has no questions, comments or concerns after bedside shift report. 0700: Patient had an uneventful shift. Respiratory status, vital signs and MEWS score remained stable. Patient was resting quietly with no signs of distress noted. Bed locked and in lowest position. Call bell water and personal belongings were within reach. Patient had no questions, comments or concerns after bedside shift report.  Bedside report given to [facility].

## 2019-08-11 NOTE — DISCHARGE SUMMARY
Discharge Summary    Patient: Law Garcia MRN: [id number]  CSN: [id number]    YOB: 1940  Age: 70 y.o.   Sex: female    DOA: 8/9/2019 LOS:  LOS: 2 days   Discharge Date:      Primary Care Provider:  Gilma Sevilla MD    Admission Diagnoses: Lower GI bleed [K92.2]    Discharge Diagnoses:    Problem List as of 8/11/2019 Date Reviewed: 8/11/2019          Codes Class Noted - Resolved    * (Principal) Lower GI bleed ICD-10-CM: K92.2  ICD-9-CM: 578.9  8/9/2019 - Present        S/P AVR (aortic valve replacement) ICD-10-CM: Z95.2  ICD-9-CM: V43.3  7/24/2019 - Present        GI bleeding ICD-10-CM: K92.2  ICD-9-CM: 578.9  7/24/2019 - Present        Anemia due to acute blood loss ICD-10-CM: D62  ICD-9-CM: 285.1  7/24/2019 - Present        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  ICD-9-CM: 289.89  3/27/2019 - Present    Overview Signed 3/27/2019  8:08 PM by Yaa Rivera MD     Of spleen and liver             Myelofibrosis ([facility] ICD-10-CM: B64.38  ICD-9-CM: 289.83  3/27/2019 - Present        Splenomegaly ICD-10-CM: R16.1  ICD-9-CM: 789.2  10/10/2018 - Present        Ascites ICD-10-CM: R18.8  ICD-9-CM: 789.59  4/24/2018 - Present        Aortic stenosis ICD-10-CM: I35.0  ICD-9-CM: 424.1  4/24/2018 - Present        Phlebitis and thrombophlebitis of femoral vein (deep) (superficial) ([facility] ICD-10-CM: I80.10  ICD-9-CM: 451.11  7/31/2016 - Present        Polycythemia vera ([facility] ICD-10-CM: D45  ICD-9-CM: 238.4  6/30/2016 - Present        Coronary artery disease involving native coronary artery ICD-10-CM: I25.10  ICD-9-CM: 414.01  6/30/2016 - Present        RESOLVED: Cirrhosis ([address] ICD-10-CM: K74.60  ICD-9-CM: 571.5  3/17/2019 - 3/27/2019        RESOLVED: Left leg cellulitis ICD-10-CM: L03.116  ICD-9-CM: 682.6  7/30/2016 - 10/10/2018        RESOLVED: Cellulitis ICD-10-CM: L03.90  ICD-9-CM: 682.9  6/30/2016 - 10/10/2018              Discharge Medications:     Current Discharge Medication List START taking these medications    Details   omeprazole (PRILOSEC) 20 mg capsule Take 1 Cap by mouth daily. Indications: inflammation of the esophagus with erosion  Qty: 30 Cap, Refills: 0         CONTINUE these medications which have NOT CHANGED    Details   hydroxyurea (HYDREA) 500 mg capsule Take 500 mg by mouth daily. cholecalciferol (VITAMIN D3) 1,000 unit tablet Take 1,000 Units by mouth daily. Indications: not sure of dose         STOP taking these medications       aspirin 81 mg chewable tablet Comments:   Reason for Stopping:               Discharge Condition: Stable    Procedures : EGD and Colonoscopy    Consults: GI/Dr. Enmanuel Sylvester      PHYSICAL EXAM   Visit Vitals  /62   Pulse 88   Temp 97 °F (36.1 °C)   Resp 12   Ht 5' 5.5\" (1.664 m)   Wt 68.9 kg (152 lb)   SpO2 96%   Breastfeeding? No   BMI 24.91 kg/m²     General: Awake, cooperative, no acute distress    HEENT: NC, Atraumatic. PERRLA, EOMI. Anicteric sclerae. Lungs:  CTA Bilaterally. No Wheezing/Rhonchi/Rales. Heart:  Regular  rhythm,  No murmur, No Rubs, No Gallops  Abdomen: Distended. + Umbilical hernia and ascities. +Bowel sounds,   Extremities: No c/c/e  Psych:   Not anxious or agitated. Neurologic:  No acute neurological deficits. Admission HPI :      A 69 y/o female with on and off lower gi bleeding over the past three weeks. Last night bleeding intensified. Bright red blood. Directly admitted from Dr. Ac Christina clinic. Discussed benji with Dr. Omid Hodge. Recommends nuc med bleeding scan to assess for active bleedingand to localize. As going out of [facility], requests that we reach out ot Dr. Enmanuel Sylvester who will be covering GI.  Mrs. Rosa Rodas will likely need a c-scope within the next few days.     Hospital Course :     Lower gi bleeding:  Pt has been having intermittent multiple bouts of BRBPR for 3 wks with her last ER visit on 07/24. Carlasha Reap her AS was severe and endoscopy and colonoscopy were postponed for valve replacement. A consult to GI was obtained. Dicussed the case with GI/Dr. Lisset Gonzalez who suggested the pt to have both EGD and Colonoscopy for further work up. Nuc med gi bleeding scan revealed with no active bleeding. Colonoscopy on 08/11:  Diverticulosis of colon without hemorrhage [K57.30]   External hemorrhoids [K64.4]   Internal hemorrhoids [K64.8]     EGD on 08/11:  Esophageal varices determined by endoscopy ([address] [I85.00]   Portal hypertensive gastropathy ([address] [K76.6, K31.89]   Gastric erosions, chronic [K25.7     She will be on omeprazole daily for 2-4 wks per GI reccommendation. Anemia: 2nd to 1. H/H been monitored and been stable.     P vera with massive splenomegaly: Ongoing. Seen and f/u by Dr. Ramonia Boas     S/p TAVR for Aortic Stenosis.     Recurrent ascites 2/2 p.vera not cirrhosis: Been seen and f/u by Dr. Norma Cabrera. Paracentesis prn and TIPS been scheduled at the end of August.      DNR    Activity: Activity as tolerated    Diet: Regular Diet    Follow-up: With PCP, Dr. Norma Cabrera within 1 wk    Disposition: Home    Minutes spent on discharge: 43 min      Labs: Results:       Chemistry Recent Labs     08/10/19  1541 08/09/19  1430   * 87    140   K 5.0 5.1    105   CO2 25 27   BUN 33* 37*   CREA 1.07 1.19   CA 8.0* 8.2*   AGAP 8 8   BUCR 31* 31*   * 349*   TP 6.9 7.5   ALB 2.9* 3.2*   GLOB 4.0 4.3*   AGRAT 0.7* 0.7*      CBC w/Diff Recent Labs     08/10/19  1541 08/10/19  0245 08/09/19  1430   WBC 31.3*  --  32.4*   RBC 4.28  --  4.65   HGB 9.9* 9.4* 10.7*   HCT 33.2* 31.3* 36.2     --  194   GRANS 79*  --  65   LYMPH 3*  --  2*   EOS 2  --  5      Cardiac Enzymes No results for input(s): CPK, CKND1, ANNIE in the last 72 hours.     No lab exists for component: CKRMB, TROIP   Coagulation Recent Labs     08/09/19  1430   PTP 14.3   INR 1.1       Lipid Panel Lab Results   Component Value Date/Time    Cholesterol, total 116 11/29/2018 07:40 AM    HDL Cholesterol 35 (L) 11/29/2018 07:40 AM    LDL, calculated 67.2 11/29/2018 07:40 AM    VLDL, calculated 13.8 11/29/2018 07:40 AM    Triglyceride 69 11/29/2018 07:40 AM    CHOL/HDL Ratio 3.3 11/29/2018 07:40 AM      BNP No results for input(s): BNPP in the last 72 hours. Liver Enzymes Recent Labs     08/10/19  1541   TP 6.9   ALB 2.9*   *   SGOT 36      Thyroid Studies No results found for: T4, T3U, TSH, TSHEXT         Significant Diagnostic Studies: Nm Acute Gi Bleed Scan    Result Date: 8/9/2019  EXAM: Nuclear medicine acute GI bleed scan INDICATION: GI bleed Past 3 weeks COMPARISON: CT dated March 17, 2019 _______________ FINDINGS: Following the administration of 27.9 mCi of technetium 99m UltraTag red blood cells injected in the left antecubital fossa, abdominal imaging was performed. This study is limited due to extensive uptake of radiotracer in a markedly enlarged spleen, which obscures a significant portion of the left side of the abdomen. Given this limitation no abnormal foci of increased radiotracer activity seen which increases with time or travels to suggest intraluminal hemorrhage. _______________     IMPRESSION: Markedly enlarged spleen with increased radiotracer activity limiting evaluation. The visualized portions the abdomen demonstrate no convincing evidence for acute intraluminal hemorrhage however as described above spleen limits evaluation. Us Guide Paracentesis    Result Date: 7/26/2019  Paracentesis with ultrasound for guidance: Indication: Recurrent ascites. Procedure: This procedure was performed with standard aseptic technique and after informed consent. Time out/start time:  10:00 AM Ultrasound localization: An appropriate location was marked for planned paracentesis. Recurrent ascites is noted. Paracentesis: After local anesthesia, a 5 [employer] May[employer] Yueh catheter was advanced into the peritoneal cavity.   Using Vacutainer's, a total of 7.4 liters of ascitic fluid was aspirated. The patient tolerated the procedure well. Impression: Successful uncomplicated ultrasound guided paracentesis. No results found for this or any previous visit.         CC: Thais Hurtado MD

## 2019-08-11 NOTE — ROUTINE PROCESS
Shift summary: 
1015: Received verbal orders from Dr. Yany Lyon for pt to receive soft diet advance as tolerated.

## 2019-08-11 NOTE — PROCEDURES
Esophagogastroduodenoscopy Procedure Note    [facility]  [id number]      Indications: gastrointestinal bleeding    Anesthesia/Sedation: MAC anesthesia Propofol    Assistants: Endoscopy Technician-1: Daxa Esparza  Endoscopy RN-1: Doyle Lyon RN    Pre-Procedure Exam:  Airway: clear   Heart: normal S1and S2    Lungs: clear bilateral  Abdomen: soft, nontender, bowel sounds present and normal in all quadrants   Mental Status: awake, alert, and oriented to person, place, and time      Procedure in Detail:  Informed consent was obtained for the procedure, including conscious sedation. Risks of pancreatitis, infection, perforation, hemorrhage, adverse drug reaction, and aspiration were discussed. The patient was placed in the left lateral decubitus position. Based on the pre-procedure assessment, including review of the patient's medical history, medications, allergies, and review of systems, he had been deemed to be an appropriate candidate for moderate sedation; he was therefore sedated with the medications listed above. He was monitored continuously with electrocardiogram tracing, pulse oximetry, blood pressure monitoring, and direct observation. The [id number] gastroscope was inserted into the mouth and advanced under direct vision to third portion of the duodenum. A careful inspection was made as the gastroscope was withdrawn, including a retroflexed view of the proximal stomach; findings and interventions are described below. Appropriate photodocumentation was obtained. Findings:   OROPHARYNX: Cords and pyriform recesses normal.   ESOPHAGUS: The esophagus showed two small distal esophageal varices. The Z-Line is intact. STOMACH: The fundus on antegrade and retroflex views is normal. The body and fundus showed portal hypertensive gastropathy. The antrum and several diminutive erosionsl.    DUODENUM: The bulb and second portions are normal.    Therapies:    none    Specimens: No specimens were collected. Complications:   None; patient tolerated the procedure well. EBL:  None           Attending Attestation:  I performed the procedure. Recommendations:  - Acid suppression with a proton pump inhibitor. Recommend Prilosec 20mg by mouth daily.  - Proceed with colonoscopy.     Signed by: Milly Hanson MD                     8/11/2019

## 2019-08-11 NOTE — PERIOP NOTES
Complaining of abd cramping post Endo procedures. Abd hernia noted to be enlarged. Seen by Dr. Martha Holbrook post procedure. Abd massage and turning from side to side effective in helping patient to expel flatus. Patient reports less discomfort, and hernia reduced.

## 2019-08-11 NOTE — H&P
Patient seen and examined yesterday with full consult on chart. Patient seen and examined today without interval change.     Dr. Albania Casey

## 2019-08-11 NOTE — PROCEDURES
Colonoscopy Procedure Note    Indications: Rectal bleeding    Anesthesia/Sedation: MAC anesthesia Propofol    Pre-Procedure Exam:  Airway: clear   Heart: normal S1and S2    Lungs: clear bilateral  Abdomen: soft, nontender, bowel sounds present and normal in all quadrants   Mental Status: awake, alert, and oriented to person, place, and time      Procedure in Detail:  Informed consent was obtained for the procedure, including sedation. Risks of perforation, hemorrhage, adverse drug reaction, and aspiration were discussed. The patient was placed in the left lateral decubitus position. Based on the pre-procedure assessment, including review of the patient's medical history, medications, allergies, and review of systems, she had been deemed to be an appropriate candidate for moderate sedation; she was therefore sedated with the medications listed above. The patient was monitored continuously with ECG tracing, pulse oximetry, blood pressure monitoring, and direct observations. A rectal examination was performed. The [id number] was inserted into the rectum and advanced under direct vision to the cecum, which was identified by the appendiceal orifice. The quality of the colonic preparation was good. A careful inspection was made as the colonoscope was withdrawn, including a retroflexed view of the rectum; findings and interventions are described below. Appropriate photodocumentation was obtained. ANUS: Anal exam reveals no masses and sphincter tone is normal.  Large external hemorrhoids with stigmata of recent bleeding. RECTUM: Rectal exam reveals large external and internal hemorrhoids. SIGMOID COLON: The mucosa is normal with reduced vascular pattern and without ulcers and polyps. Diverticulosis present  DESCENDING COLON: The mucosa is normal with reduced vascular pattern and without ulcers, diverticula, and polyps.    SPLENIC FLEXURE: The splenic flexure is normal.   TRANSVERSE COLON: The mucosa is normal with reduced vascular pattern and without ulcers, diverticula, and polyps. HEPATIC FLEXURE: The hepatic flexure is normal.   ASCENDING COLON: The mucosa is normal with good reduced vascular pattern and without ulcers and polyps. Single inverted diverticulum. CECUM: The appendiceal orifice appears normal. The ileocecal valve appears normal.   TERMINAL ILEUM: The terminal ileum was not entered. Specimens: No specimens were collected. EBL: None    Withdrawal Time: 13 minutes    Complications: None; patient tolerated the procedure well. Attending Attestation: I performed the procedure. Recommendations:   - PPI per EGD  - bleeding from external hemorrhoids  - pt okay to go home today from GI standpoint  - advance diet as tolerated  - pt with f/u with IR on 20 Aug with planned TIPS on 29 Aug which will treat her ascites, esophageal varices, hemorrhoids and reduce her umbilical hernia  - pt should f/u with Dr. Ivelisse Macias  - no further colonoscopies needed, unless patient has a different problem.     Signed By: Leonor Bajwa MD                      8/11/2019    ADDENDUM  Called and spoke with Dr. Juliann Choe, relayed results and recommendations

## 2019-08-11 NOTE — ANESTHESIA PREPROCEDURE EVALUATION
Relevant Problems No relevant active problems Anesthetic History No history of anesthetic complications Review of Systems / Medical History Patient summary reviewed, nursing notes reviewed and pertinent labs reviewed Pulmonary Within defined limits Neuro/Psych Within defined limits Cardiovascular Valvular problems/murmurs: aortic stenosis CAD and CABG Pertinent negatives: No hypertension, past MI, dysrhythmias and angina Comments: AS s/p aortic valve replacement GI/Hepatic/Renal 
  
 
 
 
Liver disease Pertinent negatives: No GERD, hepatitis and renal disease Comments: Splenomegaly, GI bleed Endo/Other Blood dyscrasia and anemia Pertinent negatives: No diabetes, hypothyroidism, hyperthyroidism and obesity Other Findings Comments: Polycythemia Physical Exam 
 
Airway Mallampati: II 
TM Distance: 4 - 6 cm Neck ROM: normal range of motion Mouth opening: Normal 
 
 Cardiovascular Regular rate and rhythm,  S1 and S2 normal,  no murmur, click, rub, or gallop Dental 
 
 
Comments: Missing few molars Pulmonary Breath sounds clear to auscultation Abdominal 
GI exam deferred Other Findings Anesthetic Plan ASA: 4 Anesthesia type: MAC Induction: Intravenous Anesthetic plan and risks discussed with: Patient

## 2019-08-11 NOTE — PERIOP NOTES
Post Endoscopy SBAR report    Report given post procedure to:FLOOR RN  Patient tolerated procedure well.   Vital signs stable  Findings:  LARGE INTERNAL AND EXTERNAL HEMORRHOIDS,DIVERTICULOSIS, ESOPHAGEAL VARICES, PORTAL GASTROPATHY, GASTRITIS, GASTRIC EROSIONS      Medications given in the procedure: MAC  Fentanyl   25  Mcg  Versed   2   Mg  PROPOFOL  50  100 LIDO  4 ZOFRAN   400 LR

## 2019-08-23 NOTE — PROGRESS NOTES
Spoke with patient to verify medications. patient is NOT taking any plavix or aspirin at this time and will not until after procedure. Cardiac clearance from Dr Luigi Villanueva has been scanned into chart.

## 2019-08-27 NOTE — PROCEDURES
POST PARACENTESIS PROCEDURE NOTE:    Procedure: US Guided Paracentesis    Pre-operative Diagnosis: [facility] c Sinusoidal obstruction, Portal HTN and Intractable Ascites    Post-operative Diagnosis: same    Indication: Recurrent ascites    Procedure Details: Standard aseptic technique. Ultrasound guidance. RLQ approach. 1% lidocaine for local anesthesia. 5 [place] Yueh sheathed needle connected to vacuum bottle. Large volume ascites, exact amount in dictated report. Specimen: taken to lab         Complications:  None. EBL: Minimal.               Condition: Stable. Impression:  Successful paracentesis. Plan: Observe x 1 hr then d/c home       Luiz Cole MD  Vascular & Interventional Radiology    [facility]     8/27/2019    ADDENDUM; The first 8 ltrs of fluid was clear yellow. The fluid then became blood tinged and was intermittently more bloody. US tech removed Yueh catheter at [facility] and held manual compression. Small volume of residual ascites seen on limited f/u US. Catheter likely sucked up against a bowel wall vein and caused bleeding. She seems perfectly fine but will watch for an extra hour to be safe.     [address] [facility]

## 2019-08-27 NOTE — ROUTINE PROCESS
RLQ Paracentesis performed by Dr. Kady Hooper. Time out 1504. 58613 cc fluid drained and sent to lab for testing. Pt tolerated procedure well and without pain. Pt transported to care unit in stable condition.

## 2019-08-27 NOTE — DISCHARGE INSTRUCTIONS
Nilesh Mast PARACENTESIS DISCHARGE INSTRUCTIONS    General Information:  During this procedure, the doctor will insert a needle into the abdomen to drain fluid. After the procedure, you will be able to take a deep breath much easier. The site of the puncture may ooze the first day. This will decrease and eventually stop. Paracentesis (draining fluid from the abdomen) sometimes makes patients hypotensive (low blood pressure). Your doctor may order for you to receive fluids or albumin (a volume booster) during the procedure through an IV site. Home Care Instructions:  Keep the puncture site clean and dry. No tub baths or swimming until puncture site heals. Showering is acceptable. Resume your normal diet, and resume your normal activity slowly and as you tolerate. If you are short of breath, rest. If shortness of breath does not ease, please call your ordering doctor. Fluid can re-accumulate in the chest and/or in the abdomen. If this should occur, your doctor needs to know as you may need to have the procedure done again. Call If:     You should call your Physician and/or the Radiology Nurse if you notice any signs of infection, like pus draining, or if it is swollen or reddened. Also call if you have a fever, or if you are bleeding from the puncture site more than a small amount on the dressing. Call if the puncture site keeps draining fluid. Some oozing is to be expected, but should slow and then stop. Call if you feel like you have pressure in your abdomen. SEEK IMMEDIATE CARE OR CALL 911 IF YOU SUDDENLY HAVE TROUBLE BREATHING, OR IF YOUR LIPS TURN BLUE, OR IF YOU NOTICE BLOOD IN YOUR SPUTUM. Follow-Up Instructions: Please see your ordering doctor as he/she has requested.      To Reach Us:        Date: 8/27/2019  Discharging Nurse: Annie Holden RN        DISCHARGE SUMMARY from Nurse    PATIENT INSTRUCTIONS:    After general anesthesia or intravenous sedation, for 24 hours or while taking prescription Narcotics:  · Limit your activities  · Do not drive and operate hazardous machinery  · Do not make important personal or business decisions  · Do  not drink alcoholic beverages  · If you have not urinated within 8 hours after discharge, please contact your surgeon on call. Report the following to your surgeon:  · Excessive pain, swelling, redness or odor of or around the surgical area  · Temperature over 100.5  · Nausea and vomiting lasting longer than 4 hours or if unable to take medications  · Any signs of decreased circulation or nerve impairment to extremity: change in color, persistent  numbness, tingling, coldness or increase pain  · Any questions    What to do at Home:  Recommended activity: Activity as tolerated,       *  Please give a list of your current medications to your Primary Care Provider. *  Please update this list whenever your medications are discontinued, doses are      changed, or new medications (including over-the-counter products) are added. *  Please carry medication information at all times in case of emergency situations. These are general instructions for a healthy lifestyle:    No smoking/ No tobacco products/ Avoid exposure to second hand smoke  Surgeon General's Warning:  Quitting smoking now greatly reduces serious risk to your health. Obesity, smoking, and sedentary lifestyle greatly increases your risk for illness    A healthy diet, regular physical exercise & weight monitoring are important for maintaining a healthy lifestyle    You may be retaining fluid if you have a history of heart failure or if you experience any of the following symptoms:  Weight gain of 3 pounds or more overnight or 5 pounds in a week, increased swelling in our hands or feet or shortness of breath while lying flat in bed. Please call your doctor as soon as you notice any of these symptoms; do not wait until your next office visit.         The discharge information has been reviewed with the patient. The patient verbalized understanding. Discharge medications reviewed with the patient and appropriate educational materials and side effects teaching were provided.     Patient armband removed and shredded  ___________________________________________________________________________________________________________________________________

## 2019-08-27 NOTE — PROGRESS NOTES
Back from paracentesis. First bottle Albumin completed, 2 nd one bottle hung. Large band aid intact to right side of abdomen dry & intact. denies pain, diet given. 1535 Albumin completed. Dressing intact to right side abdomen dry & intact. 1600 Ambulated to bathroom, voided. Back to room. 1630 Discharge instructions reviewed, verbalized understanding. Pt denies pain. Dressing intact to right side of abdomen. Pt discharged home in care of son via w/c in stable condition. Denies pain dressing intact and dry.

## 2019-08-29 PROBLEM — I35.0 AORTIC STENOSIS: Status: RESOLVED | Noted: 2018-04-24 | Resolved: 2019-01-01

## 2019-08-29 PROBLEM — K92.2 GI BLEEDING: Status: RESOLVED | Noted: 2019-01-01 | Resolved: 2019-01-01

## 2019-08-29 PROBLEM — D72.823 LEUKEMOID REACTION: Status: ACTIVE | Noted: 2019-01-01

## 2019-08-29 PROBLEM — K92.2 LOWER GI BLEED: Status: RESOLVED | Noted: 2019-01-01 | Resolved: 2019-01-01

## 2019-08-29 PROBLEM — Z95.828 S/P TIPS (TRANSJUGULAR INTRAHEPATIC PORTOSYSTEMIC SHUNT): Status: ACTIVE | Noted: 2019-01-01

## 2019-08-29 NOTE — CONSULTS
[facility]  Pulmonary, Critical Care, and Sleep Medicine    Initial Patient Consult    Name: Dean Ridley MRN: [id number]   : 1940 Hospital: [facility] [place]   Date: 2019  Room: [facility]/     Subjective: This patient has been seen and evaluated at the request of Dr. Praneeth aKye for patient in icu post TIPS. Patient is a 70 y.o. female with hx of chronic abdominal distention in . This was initially thought to be due to hepatomegaly and splenomegaly from polycythemia vera and myelofibrosis. Subsequently, patient developed ascites and underwent paracentesis in 2018, cytology of the ascites was negative. 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 She follows with Dr Elvira Robledo and Dr Savannah Conroy. Patient recently had admission for LGI bleeding in Aug 2019. She had EGD which showed two small distal esophageal varices, and body and fundus of stomach showed portal hypertensive gastropathy. Colonoscopy showed large external hemorrhoids with stigmata of recent bleeding. RBC scan was neg for active bleeding. Hx of AV replacement TAVR May 2019. Patient has been admitted for TIPS due to refractory ascites. S/p paracentesis 3.9 L followed by TIPS procedure. Patient in icu. She is doing well. No worsening abd pains. No cough or SOB. No CPs or hemoptysis. BLOOD SMEAR 19  PERIPHERAL BLOOD SMEAR:   NEUTROPHILIC LEUKOCYTOSIS WITH IMMATURE FORMS PRESENT. MILD FROM CYTOPENIA. MILD PERIPHERAL ANEMIA. DIAGNOSIS COMMENT:   The leukocytosis could be reactive, such as associated with sepsis. However, the appearance of blasts is some cause for concern. If the leukocytosis does not respond with appropriate medical therapy, please consider further evaluation for myeloid neoplasia.  The findings could be related to the patient's history of myeloproliferative disease. Clinical correlation is required. LIVER, CORE BIOPSY: MARCH 2019  EXTRAMEDULLARY HEMATOPOIESIS. MILD PERIPORTAL FIBROSIS WITH SHORT FIBROUS SEPTAE. DIAGNOSIS COMMENT:   I do not see definite evidence of cirrhosis in the current biopsy. The hepatic sinusoids show extensive presence of extramedullary hematopoiesis ([address]). While hepatomegaly and splenomegaly are frequently seen in association with myeloproliferative disorders, there are occasional reports of ascites associated with EMH (J CLIN EXP HEPATOL 2012;2:188-190). Clinical correlation is required. The slide and case were reviewed with Dr. Norma Cabrera. Past Medical History:   Diagnosis Date    Aortic stenosis     Arrhythmia     Breast CA ([address]     CAD (coronary artery disease)     cabg 20 yrs ago  2019 leaking heart valve to be repaired 6/26/19    Cancer ([address] 2004    breast, rt    Cellulitis of right leg     Ill-defined condition     polocythemia vera    Liver disease     swollen liver-swollen spleen-hernia 2017    Polycythemia vera ([address] 2016    Thromboembolus ([address] 06/2016    DVT left leg      Past Surgical History:   Procedure Laterality Date    BREAST SURGERY PROCEDURE UNLISTED  2005    part. rt.mastectomy    CARDIAC SURG PROCEDURE UNLIST      2x bypass 20 years ago    CHEST SURGERY PROCEDURE UNLISTED      COLONOSCOPY N/A 8/11/2019    COLONOSCOPY performed by Brittney Nieves MD at [facility]COPY    HX AORTIC VALVE REPLACEMENT  06/2019    HX CORONARY ARTERY BYPASS GRAFT  1996    HX HEENT      cataracts    IR BX TRANSCATHETER  3/22/2019    IR INSERT TIPS HEPATIC SHUNT  8/29/2019      Prior to Admission medications    Medication Sig Start Date End Date Taking? Authorizing Provider   aspirin delayed-release 81 mg tablet Take 81 mg by mouth daily. Yes Provider, Historical   hydroxyurea (HYDREA) 500 mg capsule Take 500 mg by mouth daily.     Provider, Historical   furosemide (LASIX) 20 mg tablet Take  by mouth daily as needed. Provider, Historical     No Known Allergies   Social History     Tobacco Use    Smoking status: Never Smoker    Smokeless tobacco: Never Used   Substance Use Topics    Alcohol use: Yes     Frequency: Never     Comment: 2 glasses per month- or less      Family History   Problem Relation Age of Onset    Migraines Maternal Grandmother     Cancer Mother     Cancer Father         Current Facility-Administered Medications   Medication Dose Route Frequency    0.9% sodium chloride infusion  25 mL/hr IntraVENous CONTINUOUS    ceFAZolin (ANCEF) 2 g/20 mL in sterile water IV syringe  2 g IntraVENous ONCE    lactated Ringers infusion  50 mL/hr IntraVENous CONTINUOUS    ondansetron (ZOFRAN) injection 4 mg  4 mg IntraVENous ONCE    iopamidol (ISOVUE 300) 61 % contrast injection 1-150 mL  1-150 mL IntraVENous RAD CONTINUOUS       Review of Systems:  Ears, nose, mouth, throat, and face: No epistaxis, No nasal drainage, no difficulty in swallowing  Respiratory: as above  Cardiovascular: no chest pain or palpitations, no chronic leg edema, no syncope  Gastrointestinal: no abd pain, vomitting or diarrhea, no bleeding symptoms; chronic ascites with repeated paracentesis  Genitourinary: No urinary symptoms  Integument/breast: No ulcers  Musculoskeletal:Neg  Neurological: No focal weakness or seizures or headaches  Behvioral/Psych: No anxiety or depression  Constitutional: No fever or chills or weight loss or night sweats       Objective:   Vital Signs:    Visit Vitals  /70   Pulse 90   Temp 99 °F (37.2 °C)   Resp 25   SpO2 94%       O2 Device: Nasal cannula   O2 Flow Rate (L/min): 3 l/min   Temp (24hrs), Av.3 °F (36.8 °C), Min:97.5 °F (36.4 °C), Max:99 °F (37.2 °C)       Intake/Output:   Last shift:       07 -  1900  In: 1000 [I.V.:1000]  Out: 5   Last 3 shifts: No intake/output data recorded.     Intake/Output Summary (Last 24 hours) at 2019 1530  Last data filed at 8/29/2019 1209  Gross per 24 hour   Intake 1000 ml   Output 5 ml   Net 995 ml         Physical Exam:   Comfortable; on nc O2; acyanotic; thin and frail   HEENT: pupils not dilated, no scleral jaundice, moist oral mucosa  Neck: No adenopathy or thyroid swelling  CVS: S1S2 no murmurs; JVD not elevated  RS: Mod air entry bilaterally, decreased BS at bases, no wheezes or crackles  Abd: soft, non tender, massive splenomegaly, mild abd distension, no guarding or rigidity, bowel sounds heard  Neuro: awake, alert, non focal exam  Extrm: no leg edema or swelling or clubbing  Skin: no rash  Lymphatic: no cervical or supraclavicular adenopathy  RT neck sheath; no bleeding or hematoma    Telemetry: SR    Data review:     Recent Results (from the past 24 hour(s))   TYPE & SCREEN    Collection Time: 08/29/19  8:35 AM   Result Value Ref Range    Crossmatch Expiration 09/01/2019     ABO/Rh(D) A POSITIVE     Antibody screen NEG    CBC WITH AUTOMATED DIFF    Collection Time: 08/29/19  8:35 AM   Result Value Ref Range    WBC 69.8 (HH) 4.6 - 13.2 K/uL    RBC 4.67 4.20 - 5.30 M/uL    HGB 10.6 (L) 12.0 - 16.0 g/dL    HCT 35.8 35.0 - 45.0 %    MCV 76.7 74.0 - 97.0 FL    MCH 22.7 (L) 24.0 - 34.0 PG    MCHC 29.6 (L) 31.0 - 37.0 g/dL    RDW 24.3 (H) 11.6 - 14.5 %    PLATELET 692 (L) 369 - 420 K/uL    NEUTROPHILS 76 (H) 42 - 75 %    LYMPHOCYTES 5 (L) 20 - 51 %    MONOCYTES 2 2 - 9 %    EOSINOPHILS 3 0 - 5 %    BASOPHILS 1 0 - 3 %    METAMYELOCYTES 3 (H) 0 %    MYELOCYTES 8 (H) 0 %    BLASTS 2 (H) 0 %    NRBC 2.0 (H) 0  WBC    RBC COMMENTS ANISOCYTOSIS  1+        RBC COMMENTS TEARDROP CELLS  1+  SPHEROCYTES  1+        RBC COMMENTS SCHISTOCYTES  FEW  MICROCYTOSIS  1+        DF MANUAL      ABS. NEUTROPHILS 53.0 (H) 1.8 - 8.0 K/UL    ABS. LYMPHOCYTES 3.5 0.8 - 3.5 K/UL    ABS. MONOCYTES 1.4 (H) 0 - 1.0 K/UL    ABS. EOSINOPHILS 2.1 (H) 0.0 - 0.4 K/UL    ABS.  BASOPHILS 0.7 (H) 0.0 - 0.1 K/UL   METABOLIC PANEL, BASIC    Collection Time: 08/29/19  8:35 AM   Result Value Ref Range    Sodium 139 136 - 145 mmol/L    Potassium 4.1 3.5 - 5.5 mmol/L    Chloride 107 100 - 111 mmol/L    CO2 26 21 - 32 mmol/L    Anion gap 6 3.0 - 18 mmol/L    Glucose 74 74 - 99 mg/dL    BUN 33 (H) 7.0 - 18 MG/DL    Creatinine 1.27 0.6 - 1.3 MG/DL    BUN/Creatinine ratio 26 (H) 12 - 20      GFR est AA 49 (L) >60 ml/min/1.73m2    GFR est non-AA 41 (L) >60 ml/min/1.73m2    Calcium 8.5 8.5 - 10.1 MG/DL   PROTHROMBIN TIME + INR    Collection Time: 08/29/19  8:35 AM   Result Value Ref Range    Prothrombin time 14.8 11.5 - 15.2 sec    INR 1.2 0.8 - 1.2     PTT    Collection Time: 08/29/19  8:35 AM   Result Value Ref Range    aPTT 39.4 (H) 23.0 - 36.4 SEC   PERIPHERAL SMEAR    Collection Time: 08/29/19  8:35 AM   Result Value Ref Range    PERIPHERAL SMEAR (NOTE)    CELL COUNT AND DIFF, BODY FLUID    Collection Time: 08/29/19 10:39 AM   Result Value Ref Range    BODY FLUID TYPE ASCITIC FLUID       FLUID COLOR PINK      FLUID APPEARANCE CLOUDY      FLUID RBC CT. (A) NRRE /cu mm     SPECIMEN GROSSLY BLOODY. RBC'S TOO NUMEROUS TO COUNT. FLUID NUCLEATED CELLS 628 (A) NRRE /cu mm    FLD NEUTROPHILS PENDING %    FLD BANDS PENDING %    FLD LYMPHS PENDING %    FLD MONOCYTES PENDING %    FLD EOSINS PENDING %           No results for input(s): FIO2I, IFO2, HCO3I, IHCO3, HCOPOC, PCO2I, PCOPOC, IPHI, PHI, PHPOC, PO2I, PO2POC in the last 72 hours. No lab exists for component: IPOC2    All Micro Results     None           Ref. Range 8/10/2019 15:41   Bilirubin, total Latest Ref Range: 0.2 - 1.0 MG/DL 0.8   Protein, total Latest Ref Range: 6.4 - 8.2 g/dL 6.9   Albumin Latest Ref Range: 3.4 - 5.0 g/dL 2.9 (L)   Globulin Latest Ref Range: 2.0 - 4.0 g/dL 4.0   A-G Ratio Latest Ref Range: 0.8 - 1.7   0.7 (L)   ALT (SGPT) Latest Ref Range: 13 - 56 U/L 22   AST Latest Ref Range: 10 - 38 U/L 36   Alk.  phosphatase Latest Ref Range: 45 - 117 U/L 311 (H)       ECHO AUG 2018  Interpretation Summary · Estimated left ventricular ejection fraction is 61 - 65%. Biplane method used to measure ejection fraction. Normal left ventricular wall motion, no regional wall motion abnormality noted. E/E' was 13.60. · There is moderate to severe aortic stenosis. The AV peak velocity was 4.05 m/s with a peak/mean gradient of 65.67/38.86 mmHg. The peak aortic velocity was measured in the apical view. Mild aortic valve regurgitation. · Left atrial cavity size is severely dilated. · Mild tricuspid valve regurgitation is present. Mild to moderate pulmonary hypertension is present. Pulmonary arterial systolic pressure is 98.54 mmHg. · Mitral valve thickening. Mild mitral annular calcification. Trace mitral valve regurgitation. Imaging:  [x]I have personally reviewed the patients chest radiographs images and report       Results from Hospital Encounter encounter on 03/17/19   CT ABD PELV W CONT    Addendum Addendum: Digital Imaging and Communications in Medicine (DICOM) format  image data are available to nonaffiliated external healthcare facilities or  entities on a secure, media free, reciprocally searchable basis with patient authorization for at least a 12-month period after this study. Kevin Mcneil MD 3/17/2019  7:42 PM          Narrative EXAM: CT of the abdomen and pelvis    INDICATION: Pain. COMPARISON: 4/24/2018. TECHNIQUE: Axial CT imaging of the abdomen and pelvis was performed without  intravenous contrast. Evaluation of internal organs are limited due to lack of  intravenous contrast.  Multiplanar reformats were generated. One or more dose reduction techniques were used on this CT: automated exposure  control, adjustment of the mAs and/or kVp according to patient size, and  iterative reconstruction techniques.   The specific techniques used on this CT  exam have been documented in the patient's electronic medical record.    _______________    FINDINGS:      LOWER CHEST: Dianna Alex atelectasis. Stable right breast implant. LIVER, BILIARY: Heterogeneous enlarged liver with no evidence for mass. No  biliary dilation. Gallbladder is unremarkable. PANCREAS: Atrophied and stable. SPLEEN: Enlarged measuring 22 cm in length. ADRENALS: Normal.    KIDNEYS:   Bilateral atrophic cystic kidneys. No renal stones. No ureteral stones. No hydronephrosis. No hydroureter. Urinary bladder is distended. No bladder stone. LYMPH NODES: No enlarged lymph nodes. GASTROINTESTINAL TRACT: No bowel dilation or wall thickening. Uncomplicated  diverticula seen throughout colon. PELVIC ORGANS: Within normal limits. VASCULATURE: Aorta has a normal caliber. No periaortic collections. Mild  atherosclerotic vascular atherosclerotic disease. Dilated splenic and portal  veins. BONES: No significant change of diffuse mild sclerotic osseous structures. Multilevel spinal degenerative changes. OTHER:   No intraperitoneal free air. Significant ascites distending the abdomen. Umbilical hernia with ascites. Diffuse subcutaneous edema.    _______________      Impression IMPRESSION:    1. Findings suggestive of cirrhosis. No evidence of mass. Hepatosplenomegaly  similar to previous exam.  Portal hypertension. 2. Interval marked increase of ascites with distention of the abdomen. 3. No significant change of mottled sclerosis of osseous structures. 4. Bilateral atrophic kidneys consistent with medical renal disease. 5. Colonic diverticulosis.          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    Myelofibrosis ([address] D75.81    S/P AVR (aortic valve replacement) Z95.2    Leukemoid reaction D72.823     Code Status Full Code        RECOMMENDATIONS:   · Pulm: stable respirations; check CXR; incent spirometry  · Cardiac: stable hemodynamics  · ID: post procedure Ab per IR  · Renal: watch IOs and follow renal fn  · GI: s/p TIPS; check ammonia; LFTs in am; oral lactulose if ammonia elevated  · Neuro: stable  · Hem: watch Hb and Plts; chronic leucocytosis with worsening; await Dr Kirstin Garg recommendations; patient known to Hematology Dr Copoer Almendarez  · Endo: watch BG  · Fluids: albumin x 1 dose; NS maintenance 50 /hr  · Nutrition: oral diet  · Proph:  DVt and GI proph- SCDs and PPI   · D/w patient and updated medical management  Will defer respective systems problem management to primary and other consultant and follow patient in ICU with primary and other medical team  Further recommendations will be based on the patient's response to recommended treatment and results of the investigation ordered. Quality Care: PPI, DVT prophylaxis, HOB elevated, Infection control all reviewed and addressed.   PAIN AND SEDATION: prn low dose opiate with prn narcan  · Skin/Wound: no active issues  · Nutrition: oral diet  · Prophylaxis: DVT and GI Prophylaxis reviewed  · Restraints: none  · PT/OT eval and treat: as needed  · Lines/Tubes: PIVs; RT IJ sheath - defer to IR for removal   ADVANCE DIRECTIVE: Full Code    · Thank you for the consult     High complexity decision making was performed in this consultation and evaluation of this patient who is at high risk for decompensation with multiple organ involvement         Venu Mason MD

## 2019-08-29 NOTE — DISCHARGE INSTRUCTIONS
TRANSJUGULAR INTRAHEPATIC PORTOSYSTEMIC SHUNT (TIPS) DISCHARGE INSTRUCTIONS    General Information:   Your gastroenterologist has spoken to our doctors and has decided for you to have TIPS procedure done. TIPS stands for transjugular intrahepatic portosystemic shunt. It is done to reduce the pressure within the portal vein, a large vessel in the liver. It can treat several disorders, including hemorrhage and ascites that is related to your liver disease. Home Care Instructions: You can resume your regular diet and medication regimen. Do not drink alcohol, drive, or make any important legal decisions in the next 24 hours. Do not lift anything heavier than a gallon of milk, or do anything strenuous for the next 24 hours. You will notice a dressing on your neck. This was the site of the insertion for the procedure. This dressing may be removed in 24 hours. You may take a shower after the bandage comes off. Do not take a bath, swim or immerse yourself in water until the wound on your neck has completely healed. Call If:   You should call your Physician and/or Radiology Nurse if you have any bleeding other than a small spot on your bandage. Call if you have any signs of infection, fever, or increased pain at the site. Call if you have any questions of how to take care of your wound. Call if you notice your abdomen swelling. You may still have to come in for the paracentesis, but you should not have to come in as often. Follow-Up Instructions: Please see your ordering doctor as he/she has requested.      To Reach Us:     Patient Signature:  Date: 8/29/2019  Discharging Nurse: Julissa Woods RN

## 2019-08-29 NOTE — PROGRESS NOTES
TRANSFER - OUT REPORT:    Verbal report given to Kevin RN on [employer]  being transferred to [employer]) for ordered procedure       Report consisted of patients Situation, Background, Assessment and   Recommendations(SBAR). Information from the following report(s) SBAR, Kardex and MAR was reviewed with the receiving nurse. Lines:   Peripheral IV 08/29/19 Anterior;Right Forearm (Active)   Site Assessment Clean, dry, & intact 8/29/2019  8:48 AM   Phlebitis Assessment 0 8/29/2019  8:48 AM   Dressing Status Clean, dry, & intact 8/29/2019  8:48 AM   Hub Color/Line Status Pink 8/29/2019  8:48 AM   Action Taken Open ports on tubing capped 8/29/2019  8:48 AM   Alcohol Cap Used Yes 8/29/2019  8:48 AM        Opportunity for questions and clarification was provided. US Paracentesis output was 3910 ml. Patient transported with:   Registered Nurse and MARCELLO Baez.

## 2019-08-29 NOTE — H&P
History & Physical    Patient: Loretta Morales MRN: [id number]  CSN: [id number]    YOB: 1940  Age: 70 y.o. 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 Recurrent Ascites - secondary to extramedullary hematopoesis with extensive hematologic precursor cells throughout the hepatic sinusoids causing sinusoidal obstructive syndrome. She does not have cirrhosis. Polycythemia Vera - follows Dr. Nicholas Peters, takes aspirin and hydroxyurea. Ok from IR to start back on aspirin and hydroxyurea. Aortic stenosis - s/p [facility]AVR at [facility] on 06/2019.    hepatosplenomegaly    DVT prophylaxis SCD    GI ppi      Estimated length of stay : 1-2 days    CC: s/p TIPS       HPI:     Loretta Morales is a 70 y.o. female who has past history of polycythemia vera, myelofibrosis, hepatosplenomegaly, aortic stenosis s/p TAVR is admitted s/p TIPS for recurrent ascites. She has been requiring frequent paracentesis recently. She does not have cirrhosis. She was recently admitted to this hospital for GI bleed. Her procedure went well.      Past Medical History:   Diagnosis Date    Aortic stenosis     Arrhythmia     Breast CA ([facility]     CAD (coronary artery disease)     cabg 20 yrs ago  2019 leaking heart valve to be repaired 6/26/19    Cancer ([facility] 2004    breast, rt    Cellulitis of right leg     Ill-defined condition     polocythemia vera    Liver disease     swollen liver-swollen spleen-hernia 2017    Polycythemia vera ([facility] 2016    Thromboembolus ([facility] 06/2016    DVT left leg       Past Surgical History:   Procedure Laterality Date    BREAST SURGERY PROCEDURE UNLISTED  2005    part. rt.mastectomy    CARDIAC SURG PROCEDURE UNLIST      2x bypass 20 years ago    CHEST SURGERY PROCEDURE UNLISTED      COLONOSCOPY N/A 8/11/2019    COL[facility]es [facility]avedra [facility] at [facility]    HX AORTIC VALVE REPLACEMENT  06/2019    HX CORONARY ARTERY BYPASS GRAFT  1996    HX HEENT      cataracts    IR BX TRANSCATHETER  3/22/2019    IR INSERT TIPS HEPATIC SHUNT  8/29/2019       Family History   Problem Relation Age of Onset    Migraines Maternal Grandmother     Cancer Mother     Cancer Father        Social History     Socioeconomic History    Marital status:      Spouse name: Not on file    Number of children: Not on file    Years of education: Not on file    Highest education level: Not on file   Tobacco Use    Smoking status: Never Smoker    Smokeless tobacco: Never Used   Substance and Sexual Activity    Alcohol use: Yes     Frequency: Never     Comment: 2 glasses per month- or less    Drug use: No    Sexual activity: Not Currently   Other Topics Concern       Prior to Admission medications    Medication Sig Start Date End Date Taking? Authorizing Provider   aspirin delayed-release 81 mg tablet Take 81 mg by mouth daily. Yes Provider, Historical   hydroxyurea (HYDREA) 500 mg capsule Take 500 mg by mouth daily. Provider, Historical   furosemide (LASIX) 20 mg tablet Take  by mouth daily as needed. Provider, Historical       No Known Allergies    Review of Systems  Gen: No fever, chills, malaise, weight loss/gain. Heent: No headache, rhinorrhea, epistaxis, ear pain, hearing loss, sinus pain, neck pain/stiffness, sore throat. Heart: No chest pain, palpitations, LANGLEY, pnd, or orthopnea. Resp: No cough, hemoptysis, wheezing and shortness of breath. GI: see above. : No urinary obstruction, dysuria or hematuria. Derm: No rash, new skin lesion or pruritis.    Musc/skeletal: no bone or joint complains. Vasc: No edema, cyanosis or claudication. Endo: No heat/cold intolerance, no polyuria,polydipsia or polyphagia. Neuro: No unilateral weakness, numbness, tingling. No seizures. Heme: see above. Physical Exam:     Physical Exam:  Visit Vitals  /61   Pulse 97   Temp 97.7 °F (36.5 °C)   Resp (!) 33   SpO2 97%    O2 Flow Rate (L/min): 3 l/min O2 Device: Nasal cannula    Temp (24hrs), Av.1 °F (36.7 °C), Min:97.5 °F (36.4 °C), Max:99 °F (37.2 °C)     07 -  1900  In: 1000 [I.V.:1000]  Out: 5    No intake/output data recorded. General:  Awake, cooperative, no distress. Head:  Normocephalic, without obvious abnormality, atraumatic. Eyes:  Conjunctivae/corneas clear, sclera anicteric, PERRL, EOMs intact. Nose: Nares normal. No drainage or sinus tenderness. Throat: Lips, mucosa, and tongue normal.    Neck: Supple, symmetrical, trachea midline, no adenopathy. Lungs:   Clear to auscultation bilaterally. Heart:   S1, S2, no murmur, click, rub or gallop. Abdomen: Soft, mild distended non-tender. Bowel sounds normal. No masses,  hepatosplenomegaly. Extremities: Extremities normal, atraumatic, no cyanosis or edema. Capillary refill normal.   Pulses: 2+ and symmetric all extremities. Skin: Skin color pink, turgor normal. No rashes or lesions   Neurologic: CNII-XII intact. No focal motor or sensory deficit. Labs Reviewed:    CMP: No results found for: NA, K, CL, CO2, AGAP, GLU, BUN, CREA, GFRAA, GFRNA, CA, MG, PHOS, ALB, TBIL, TP, ALB, GLOB, AGRAT, SGOT, ALT, GPT  CBC: No results found for: WBC, HGB, HGBEXT, HCT, HCTEXT, PLT, PLTEXT, HGBEXT, HCTEXT, PLTEXT      Procedures/imaging: see electronic medical records for all procedures/Xrays and details which were not copied into this note but were reviewed prior to creation of Plan        CC:  Gayle Morris MD

## 2019-08-29 NOTE — ROUTINE PROCESS
1400  TRANSFER - IN REPORT:    Verbal report received from Racheal Angulo (name) on Hector Paz  being received from PACU (unit) for routine progression of care      Report consisted of patients Situation, Background, Assessment and   Recommendations(SBAR). Information from the following report(s) SBAR, ED Summary, Procedure Summary, Intake/Output and Recent Results was reviewed with the receiving nurse. Opportunity for questions and clarification was provided. Assessment completed upon patients arrival to unit and care assumed. Pt aox4, following commands, denies pain, 4L NC, tele monitored, sheath right neck. 1700  Pt sitting up at bedside eating dinner, denies pain, sob, VSS  1915  Bedside, Verbal and Written shift change report given to BRITTNEY Betancur RN (oncoming nurse) by GONZALEZ Roth (offgoing nurse). Report included the following information SBAR, Kardex, Intake/Output and Recent Results.

## 2019-08-29 NOTE — PROCEDURES
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 PV-RA Gradient:  Pre-TIPS:  12 mm Hg  Post TIPS: 8 mm Hg    Complications: None    Estimated Blood Loss:  minimal    Tubes and Drains: as above    Specimens: ascites taken to lab    Condition: Good    Disposition: To PACU initially then to ICU for overnight monitoring    Plan: 10 Fr RT IJ Sheath may be used as needed. Plan to remove sheath in am.    I d/w Dr Prisca Novak who has graciously agreed to admit her. Hero Leon MD  [facility]  Vascular & Interventional Radiology  8/29/2019       ADDENDUM:  WBC from this morning was 69.8k up from the 30s this month. No signs of sepsis. Path report of peripheral smear:  PERIPHERAL BLOOD SMEAR:   NEUTROPHILIC LEUKOCYTOSIS WITH IMMATURE FORMS PRESENT. MILD FROM CYTOPENIA. MILD PERIPHERAL ANEMIA. DIAGNOSIS COMMENT:   The leukocytosis could be reactive, such as associated with sepsis. However, the appearance of blasts is some cause for concern. If the leukocytosis does not respond with appropriate medical therapy, please consider further evaluation for myeloid neoplasia. May be worth considering a hematology consult.     [address] [place] [address]

## 2019-08-29 NOTE — ANESTHESIA POSTPROCEDURE EVALUATION
* No procedures listed *.    general    Anesthesia Post Evaluation      Multimodal analgesia: multimodal analgesia used between 6 hours prior to anesthesia start to PACU discharge  Patient location during evaluation: PACU  Patient participation: complete - patient participated  Level of consciousness: awake and alert  Pain management: adequate  Airway patency: patent  Anesthetic complications: no  Cardiovascular status: stable  Respiratory status: acceptable and nasal cannula  Hydration status: stable  Post anesthesia nausea and vomiting:  none      Vitals Value Taken Time   /73 8/29/2019  1:10 PM   Temp 37.2 °C (99 °F) 8/29/2019 12:55 PM   Pulse 91 8/29/2019  1:14 PM   Resp 26 8/29/2019  1:14 PM   SpO2 90 % 8/29/2019  1:14 PM   Vitals shown include unvalidated device data.

## 2019-08-29 NOTE — PROGRESS NOTES
Critical lab resulst received from [facility] lab of WBC=69.8. Notified Dr. Tru Mesa, interventional radiologist.  MD aware. No orders received.

## 2019-08-29 NOTE — PERIOP NOTES
TRANSFER - OUT REPORT:    Verbal report given to Geoffrey [place] Theron on [employer]  being transferred to ICU for routine post - op       Report consisted of patients Situation, Background, Assessment and   Recommendations(SBAR). Information from the following report(s) SBAR and MAR was reviewed with the receiving nurse. Opportunity for questions and clarification was provided.       Patient transported with:   O2 @ 3 liters  Registered Nurse  Pauly

## 2019-08-29 NOTE — ANESTHESIA PREPROCEDURE EVALUATION
Relevant Problems   No relevant active problems       Anesthetic History   No history of anesthetic complications            Review of Systems / Medical History  Patient summary reviewed, nursing notes reviewed and pertinent labs reviewed    Pulmonary  Within defined limits                 Neuro/Psych   Within defined limits           Cardiovascular      Valvular problems/murmurs: aortic stenosis        CAD and CABG  Pertinent negatives: No dysrhythmias  Exercise tolerance: >4 METS  Comments: S/P CABG - years ago and AVR - recently  Has done quite well and no symptoms. Clearance in chart.    GI/Hepatic/Renal           Liver disease     Endo/Other  Within defined limits           Other Findings              Physical Exam    Airway  Mallampati: II  TM Distance: 4 - 6 cm  Neck ROM: normal range of motion   Mouth opening: Normal     Cardiovascular               Dental  No notable dental hx       Pulmonary                 Abdominal         Other Findings            Anesthetic Plan    ASA: 3  Anesthesia type: general          Induction: Intravenous  Anesthetic plan and risks discussed with: Patient

## 2019-08-29 NOTE — PROGRESS NOTES
The patient is an appropriate candidate to undergo TIPS and possible paracentesis. Please see my note from 8/20/19 for details. Patient assessed immediately prior to induction. Anesthesia plan as follows:   general anesthesia. Planned agent(s):  general anesthesia    History and Physical update:  H&P was reviewed and the patient was examined. No changes have occurred in the patient's condition since the H&P was completed.     Bridgette Tolentino MD  Vascular & Interventional Radiology  [address] [place] [address],-[facility]  8/29/2019

## 2019-08-30 NOTE — ROUTINE PROCESS
TRANSFER - IN REPORT:    Verbal report received from ICU Nurse(name) on Pablo Gonzales  being received from ICU(unit) for routine progression of care      Report consisted of patients Situation, Background, Assessment and   Recommendations(SBAR). Information from the following report(s) SBAR, Procedure Summary, Intake/Output and Recent Results was reviewed with the receiving nurse. Opportunity for questions and clarification was provided. Assessment completed upon patients arrival to unit and care assumed.

## 2019-08-30 NOTE — PROGRESS NOTES
Reason for Admission:   Chart reviewed and noted Pt currently admitted to ICU for polycthemia, ascites, s/p TIPS procedure requiring frequent paracentesis. CM will continue to monitor for needs at time of discharge. RRAT Score:     16             Do you (patient/family) have any concerns for transition/discharge? TBD              Plan for utilizing home health:   TBD    Current Advanced Directive/Advance Care Plan:  No Code status, not on file. MD please consider ordering Palliative care and or  services for Advance directives needs. Transition of Care Plan:      TBD        Care Management Interventions  PCP Verified by CM: Yes  Mode of Transport at Discharge:  Other (see comment)  Transition of Care Consult (CM Consult): Discharge Planning

## 2019-08-30 NOTE — PROGRESS NOTES
Patient received Sacrament of the Sick by Father Hebert Manzo  on 8/30//2019. Entered By:  Adi Fried M.Div.    Board Certified   [phone] - Office

## 2019-08-30 NOTE — PROGRESS NOTES
1704 - Patient in bed at this time. A/O x 3. IV to right arm  intact and patent. SCDS and  to legs. Denies numbness/tingling. Pedal pulses palpable. Lungs clear. Bowel sounds active to all quadrants. Pain 0/10.     1749-Paged MD Jeanette Nails about SIRS pop up. HR is over 90 at 95, Respirations over 20 at 24, /50 temp 99.8. 61.3 WBC. 1753-MD BELL say continue to monitor. No new orders at this time. 1755-Assisted pt to use Spirometer needs encouraged can barely get to 500.    1935-Dual assessment done with oncoming nurse. Redness to sacrum Mepilex dressing applied.  Report given to [employer]

## 2019-08-30 NOTE — PROGRESS NOTES
conducted an initial consultation and Spiritual Assessment for Gaston Lloyd, who is a 70 y.o.,female. Patient is a member of [employer]. At her request I asked Father Carine Jarvis to come by and meet her (new  at the [facility]) before/after he does mass. 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 Listened empathically. Provided information about Spiritual Care Services. Offered assurance of continued prayers on patients behalf. Chart reviewed.  confirmed Patient's [employer] Affiliation. Patient processed feelings about current hospitalization. Patient expressed gratitude for Spiritual Care visit. Chaplains will continue to follow and will provide pastoral care as needed or requested.  recommends bedside caregivers page  on duty if patient shows signs of acute spiritual or emotional distress. [address], M.Div.   Board Certified   [phone] - Office

## 2019-08-30 NOTE — ROUTINE PROCESS
0700  Bedside, Verbal and Written shift change report given to GONZALEZ Tejada (oncoming nurse) by BRITTNEY Betancur RN (offgoing nurse). Report included the following information SBAR, Kardex, Intake/Output and Recent Results. 1400  Pt voided, 100cc, Bladder scan done, residual >500. Will place marrero. [address] per order. Luke Dave, RN and Kenny Mares RN  1500  Dr. Marguerite Elias on phone, orders received. Transfer pt to medical.   1615  1st attempt to call report. RN not available for report at this time. 1631  TRANSFER - OUT REPORT:    Verbal report given to IDA Hill RN (name) on German Culp  being transferred to [address] [place] [address] (unit) for change in patient condition(Retention)       Report consisted of patients Situation, Background, Assessment and   Recommendations(SBAR). Information from the following report(s) SBAR, Kardex, Procedure Summary and Recent Results was reviewed with the receiving nurse. Lines:   Peripheral IV 08/29/19 Anterior;Right Forearm (Active)   Site Assessment Clean, dry, & intact 8/30/2019  4:00 AM   Phlebitis Assessment 0 8/30/2019  4:00 AM   Infiltration Assessment 0 8/30/2019  4:00 AM   Dressing Status Clean, dry, & intact 8/30/2019  4:00 AM   Dressing Type Tape;Transparent 8/30/2019  4:00 AM   Hub Color/Line Status Infusing;Pink 8/30/2019  4:00 AM   Action Taken Open ports on tubing capped 8/30/2019  4:00 AM   Alcohol Cap Used Yes 8/30/2019  4:00 AM        Opportunity for questions and clarification was provided.       Patient transported with:   Exterity

## 2019-08-30 NOTE — PROGRESS NOTES
[facility] Patricia Last MD, [address], [facility] Candy Goncalves MD Marlinda Copping, JOHNNY Moore, [facility]     Ermelinda Francisco [facility]   Robbie Olsen, MERVIN-DUNIA Blanchard, [facility]       [facility] 136    at [address]    [address] [place] [address], [facility] Sid Cortez  22.    [phone]    FAX: [address] [place] [address], [address], [address]    [phone]    FAX: [phone]       HEPATOLOGY NOTE  I am not at [facility] today. I have reviewed the physician notes, laboratory and imaging studies in the EMR. The patient is well know to and regularly cared for by [facility]. 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 This is due to extramedulary hematopoesis within the hepatic sinusoids causing portal HTN. She undwent TIPS to relieve portal HTN yesterday. She did well with reduction in HVPG from 12 to 8 mmHg. She should be monitored in the ICU overnight. If she is alert, oriented, can eat and ambulate she can go home today. She should not go home on diuretics. She does not need lactulose since she does not have cirrhosis and has never had HE. She does not need IV albumin since she does not have cirrhosis, Sna is normal.        ASSESSMENT AND PLAN:  Ascites   The etiology for ascites is extramedulary hematopoesis with extensive hematologic precursor cells throughout the hepatic sinusoids causing sinusoidal obstructive syndrome. The patient does not have cirrhosis    The patient underwent a transjugular liver biopsy with hepatic venous pressure measurements in 3/2019.    Liver slides were reviewed and this demonstrate normal liver, no fibrosis, and extensive extramedulary hematopoesis throughout sinusoids. Hepatic vein pressures were RA 1, IVC 2, free hepatic vein 3, wedge hepatic vein 14. HVPG = 11. Ascites albumin in 3/2019 was 1.7 with SAAG = 1.4 consistent with portal HTN as the etiology for ascites. ECHO demonstrated mild pulmonary HTN but normal RV and only mild TR. Elastography demonstrated elevated liver stiffness. This is due to filling the sinusoids with hematology precursors and causing hepatic congestion from physiology that is consistent with outflow obstruction. TIPS was placed yesterday. The HVPG was reduced from 12 to 8 mmHg. MYRTLE  Screat is mildly increased to 1.4 mg  This can be treated with IV fluids. Do not restart any diuretics. Aortic stenosis  ECHO showed tight aortic stenosis. This is likely the etiology for mild pulmonary HTN  TAVR was recently performed. Elevated WBC  The WBC is markedly elevated and increased from baseline of 31K to 61-69K. She had been on hydroxyurea to keep her counts low and the elevation may be due to that or emergence of leukemia. She is regulary followed by Dr Cooper Weber. Will consult him this AM as he may want to due bone marrow evaluation while she is here or her could defer this to out-patient. Results for Yenny Dus (MRN [id number]) as of 8/30/2019 06:19   Ref.  Range 8/9/2019 14:30 8/10/2019 15:41 8/29/2019 08:35 8/30/2019 03:49   WBC Latest Ref Range: 4.6 - 13.2 K/uL 32.4 (H) 31.3 (H) 69.8 (HH) 61.3 (HH)   HGB Latest Ref Range: 12.0 - 16.0 g/dL 10.7 (L) 9.9 (L) 10.6 (L) 9.2 (L)   PLATELET Latest Ref Range: 135 - 420 K/uL 194 189 118 (L) 90 (L)   INR Latest Ref Range: 0.8 - 1.2   1.1  1.2    Sodium Latest Ref Range: 136 - 145 mmol/L 140 137 139 139   Potassium Latest Ref Range: 3.5 - 5.5 mmol/L 5.1 5.0 4.1 4.6   Chloride Latest Ref Range: 100 - 111 mmol/L 105 104 107 109   CO2 Latest Ref Range: 21 - 32 mmol/L 27 25 26 22   Glucose Latest Ref Range: 74 - 99 mg/dL 87 113 (H) 74 79   BUN Latest Ref Range: 7.0 - 18 MG/DL 37 (H) 33 (H) 33 (H) 34 (H)   Creatinine Latest Ref Range: 0.6 - 1.3 MG/DL 1.19 1.07 1.27 1.41 (H)   Bilirubin, total Latest Ref Range: 0.2 - 1.0 MG/DL 0.7 0.8  0.8   Albumin Latest Ref Range: 3.4 - 5.0 g/dL 3.2 (L) 2.9 (L)  2.8 (L)   ALT (SGPT) Latest Ref Range: 13 - 56 U/L 25 22  214 (H)   AST Latest Ref Range: 10 - 38 U/L 43 (H) 36  489 (H)   Alk.  phosphatase Latest Ref Range: 45 - 117 U/L 349 (H) 311 (H)  306 (H)       MD Lizzie [facility]  [address] [place] [address] A, [address] [place] [address] [address]

## 2019-08-30 NOTE — PROGRESS NOTES
Bedside shift change report received from GONZALEZ Natarajan RN (offgoing nurse). Report included the following information SBAR, Kardex, Intake/Output, MAR and Recent Results. 1945 Shift assessment completed, see EMR.    2300 Patient assisted to bedside commode, void 50 ml urine but patient complained of bladder pressure and pain. Bladder scan done which recorded 877 ml urine in bladder. 200 Notified Dr. Teresa Quiñones that patient complained of bladder pressure and pain after voiding 50 ml and bladder scan measured 877 ml in bladder. New order given for straight catheter once. 2335 Straight cathete patient drained 350 ml from bladder. 2345 Reassessment completed, see EMR    0400 Reassessment completed, see EMR    4119 Patient assisted to bedside commode but unable to void. Notified Dr. Teresa Quiñones of patient's concern. Order given to bladder scan patient and If bladder scan greater than 250 mL straight catheter. Then bladder scan in 6 hr if bladder scan greater than 250 ml insert indwelling marerro catheter. 0740 Bladder scanner recorded 641 ml in bladder, straight cathete patient as ordered drained 375 ml urine. Bedside and Verbal shift change report given to GONZALEZ Natarajan RN (oncoming nurse)  Report included the following information SBAR, Kardex, Intake/Output, MAR and Recent Results.

## 2019-08-30 NOTE — PROGRESS NOTES
[facility]  Pulmonary, Critical Care, and Sleep Medicine    Pulm/CC Note    Name: German Culp MRN: [id number]   : 1940 Hospital: [facility] [place]   Date: 2019  Room: [employer]/     Subjective: This patient has been seen and evaluated at the request of Dr. Daniel Crenshaw for patient in icu post TIPS. Patient is a 70 y.o. female with hx of chronic abdominal distention in . This was initially thought to be due to hepatomegaly and splenomegaly from polycythemia vera and myelofibrosis. Subsequently, patient developed ascites and underwent paracentesis in 2018, cytology of the ascites was negative. 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 She follows with Dr Mary Waters and Dr Leslie Caro. Patient recently had admission for LGI bleeding in Aug 2019. She had EGD which showed two small distal esophageal varices, and body and fundus of stomach showed portal hypertensive gastropathy. Colonoscopy showed large external hemorrhoids with stigmata of recent bleeding. RBC scan was neg for active bleeding. Hx of AV replacement TAVR May 2019. Patient has been admitted for TIPS due to refractory ascites. S/p paracentesis 3.9 L followed by TIPS procedure 19.     19  Patient in icu. Awake, alert. Difficulty voiding in lying position. Otherwise no complaints. No worsening abd pains. No cough or SOB. No CPs or hemoptysis. Afebrile; BP stable   ml overnight      BLOOD SMEAR 19  PERIPHERAL BLOOD SMEAR:   NEUTROPHILIC LEUKOCYTOSIS WITH IMMATURE FORMS PRESENT. MILD FROM CYTOPENIA. MILD PERIPHERAL ANEMIA. DIAGNOSIS COMMENT:   The leukocytosis could be reactive, such as associated with sepsis. However, the appearance of blasts is some cause for concern.  If the leukocytosis does not respond with appropriate medical therapy, please consider further evaluation for myeloid neoplasia. The findings could be related to the patient's history of myeloproliferative disease. Clinical correlation is required. LIVER, CORE BIOPSY: MARCH 2019  EXTRAMEDULLARY HEMATOPOIESIS. MILD PERIPORTAL FIBROSIS WITH SHORT FIBROUS SEPTAE. DIAGNOSIS COMMENT:   I do not see definite evidence of cirrhosis in the current biopsy. The hepatic sinusoids show extensive presence of extramedullary hematopoiesis ([address]). While hepatomegaly and splenomegaly are frequently seen in association with myeloproliferative disorders, there are occasional reports of ascites associated with EMH (J CLIN EXP HEPATOL 2012;2:188-190). Clinical correlation is required. The slide and case were reviewed with Dr. Zee Khoury. Past Medical History:   Diagnosis Date    Aortic stenosis     Arrhythmia     Breast CA ([address]     CAD (coronary artery disease)     cabg 20 yrs ago  2019 leaking heart valve to be repaired 6/26/19    Cancer ([address] 2004    breast, rt    Cellulitis of right leg     Ill-defined condition     polocythemia vera    Liver disease     swollen liver-swollen spleen-hernia 2017    Polycythemia vera ([address] 2016    Thromboembolus ([address] 06/2016    DVT left leg      Past Surgical History:   Procedure Laterality Date    BREAST SURGERY PROCEDURE UNLISTED  2005    part. rt.mastectomy    CARDIAC SURG PROCEDURE UNLIST      2x bypass 20 years ago    CHEST SURGERY PROCEDURE UNLISTED      COLONOSCOPY N/A 8/11/2019    COLONOSCOPY performed by Milton Maza MD [facility]    HX AORTIC VALVE REPLACEMENT  06/2019    HX CORONARY ARTERY BYPASS GRAFT  1996    HX HEENT      cataracts    IR BX TRANSCATHETER  3/22/2019    IR INSERT TIPS HEPATIC SHUNT  8/29/2019      Prior to Admission medications    Medication Sig Start Date End Date Taking? Authorizing Provider   aspirin delayed-release 81 mg tablet Take 81 mg by mouth daily.    Yes Provider, Historical   hydroxyurea (HYDREA) 500 mg capsule Take 500 mg by mouth daily. Provider, Historical   furosemide (LASIX) 20 mg tablet Take  by mouth daily as needed. Provider, Historical     No Known Allergies   Social History     Tobacco Use    Smoking status: Never Smoker    Smokeless tobacco: Never Used   Substance Use Topics    Alcohol use: Yes     Frequency: Never     Comment: 2 glasses per month- or less      Family History   Problem Relation Age of Onset    Migraines Maternal Grandmother     Cancer Mother     Cancer Father         Current Facility-Administered Medications   Medication Dose Route Frequency    iopamidol (ISOVUE 300) 61 % contrast injection 1-150 mL  1-150 mL IntraVENous RAD CONTINUOUS    0.9% sodium chloride infusion  50 mL/hr IntraVENous CONTINUOUS    aspirin delayed-release tablet 81 mg  81 mg Oral DAILY    hydroxyurea (HYDREA) chemo cap 500 mg  500 mg Oral DAILY    pantoprazole (PROTONIX) tablet 40 mg  40 mg Oral ACB       Review of Systems:  Ears, nose, mouth, throat, and face: No epistaxis, no difficulty in swallowing  Respiratory: as above  Cardiovascular: no chest pain or palpitations, no chronic leg edema  Gastrointestinal: no abd pain, vomitting or diarrhea, no bleeding symptoms; chronic ascites with repeated paracentesis  Genitourinary: No urinary symptoms  Neurological: No focal weakness or seizures or headaches; not confused  Behvioral/Psych: No anxiety or depression  Constitutional: No fever or chills or weight loss       Objective:   Vital Signs:    Visit Vitals  /56   Pulse 93   Temp 97.8 °F (36.6 °C)   Resp 27   SpO2 94%   Breastfeeding?  No       O2 Device: Room air   O2 Flow Rate (L/min): 2 l/min   Temp (24hrs), Av °F (36.7 °C), Min:97.5 °F (36.4 °C), Max:99 °F (37.2 °C)       Intake/Output:   Last shift:      701 - 1900  In: -   Out: 375 [Urine:375]  Last 3 shifts: 1901 -  0700  In: 1687.5 [I.V.:1687.5]  Out: 784 [Urine:450]    Intake/Output Summary (Last 24 hours) at 8/30/2019 1003  Last data filed at 8/30/2019 9088  Gross per 24 hour   Intake 1687.5 ml   Output 830 ml   Net 857.5 ml         Physical Exam:   Comfortable; on nc O2; acyanotic; thin and frail   HEENT: pupils not dilated, no scleral jaundice, moist oral mucosa  Neck: No adenopathy or thyroid swelling  CVS: S1S2 no murmurs; JVD not elevated  RS: Mod air entry bilaterally, decreased BS at bases, no wheezes or crackles  Abd: soft, non tender, massive splenomegaly, mild abd distension, no guarding or rigidity, bowel sounds heard  Neuro: awake, alert, non focal exam  Extrm: no leg edema or swelling or clubbing  Skin: no rash  Lymphatic: no cervical or supraclavicular adenopathy  RT neck sheath; no bleeding or hematoma    Telemetry: SR    Data review:     Recent Results (from the past 24 hour(s))   CELL COUNT AND DIFF, BODY FLUID    Collection Time: 08/29/19 10:39 AM   Result Value Ref Range    BODY FLUID TYPE ASCITIC FLUID       FLUID COLOR PINK      FLUID APPEARANCE CLOUDY      FLUID RBC CT. (A) NRRE /cu mm     SPECIMEN GROSSLY BLOODY. RBC'S TOO NUMEROUS TO COUNT.     FLUID NUCLEATED CELLS 628 (A) NRRE /cu mm    FLD NEUTROPHILS 59 %    FLD BANDS 7 %    FLD LYMPHS 19 %    FLD MONOCYTES 4 %    FLD EOSINS 1 %    MACROPHAGE 10 %   AMMONIA    Collection Time: 08/29/19  4:11 PM   Result Value Ref Range    Ammonia 58 (H) 11 - 32 UMOL/L   AMMONIA    Collection Time: 08/30/19  3:49 AM   Result Value Ref Range    Ammonia 61 (H) 11 - 32 UMOL/L   CBC WITH AUTOMATED DIFF    Collection Time: 08/30/19  3:49 AM   Result Value Ref Range    WBC 61.3 (HH) 4.6 - 13.2 K/uL    RBC 4.05 (L) 4.20 - 5.30 M/uL    HGB 9.2 (L) 12.0 - 16.0 g/dL    HCT 31.1 (L) 35.0 - 45.0 %    MCV 76.8 74.0 - 97.0 FL    MCH 22.7 (L) 24.0 - 34.0 PG    MCHC 29.6 (L) 31.0 - 37.0 g/dL    RDW 24.3 (H) 11.6 - 14.5 %    PLATELET 90 (L) 596 - 420 K/uL    NEUTROPHILS 65 42 - 75 %    BAND NEUTROPHILS 16 (H) 0 - 5 %    LYMPHOCYTES 1 (L) 20 - 51 %    MONOCYTES 2 2 - 9 % EOSINOPHILS 6 (H) 0 - 5 %    BASOPHILS 1 0 - 3 %    METAMYELOCYTES 5 (H) 0 %    MYELOCYTES 4 (H) 0 %    ABS. NEUTROPHILS 39.8 (H) 1.8 - 8.0 K/UL    ABS. LYMPHOCYTES 0.6 (L) 0.8 - 3.5 K/UL    ABS. MONOCYTES 1.2 (H) 0 - 1.0 K/UL    ABS. EOSINOPHILS 3.7 (H) 0.0 - 0.4 K/UL    ABS. BASOPHILS 0.6 (H) 0.0 - 0.1 K/UL    PLATELET COMMENTS SLIDE ESTIMATE CONFIRMS PLT COUNT      RBC COMMENTS ANISOCYTOSIS  2+        RBC COMMENTS POIKILOCYTOSIS  1+        RBC COMMENTS        POLYCHROMASIA  SLIGHT  SCHISTOCYTES  FEW  TEARDROP CELLS  1+      DF MANUAL     METABOLIC PANEL, COMPREHENSIVE    Collection Time: 08/30/19  3:49 AM   Result Value Ref Range    Sodium 139 136 - 145 mmol/L    Potassium 4.6 3.5 - 5.5 mmol/L    Chloride 109 100 - 111 mmol/L    CO2 22 21 - 32 mmol/L    Anion gap 8 3.0 - 18 mmol/L    Glucose 79 74 - 99 mg/dL    BUN 34 (H) 7.0 - 18 MG/DL    Creatinine 1.41 (H) 0.6 - 1.3 MG/DL    BUN/Creatinine ratio 24 (H) 12 - 20      GFR est AA 44 (L) >60 ml/min/1.73m2    GFR est non-AA 36 (L) >60 ml/min/1.73m2    Calcium 7.6 (L) 8.5 - 10.1 MG/DL    Bilirubin, total 0.8 0.2 - 1.0 MG/DL    ALT (SGPT) 214 (H) 13 - 56 U/L    AST (SGOT) 489 (H) 10 - 38 U/L    Alk. phosphatase 306 (H) 45 - 117 U/L    Protein, total 6.2 (L) 6.4 - 8.2 g/dL    Albumin 2.8 (L) 3.4 - 5.0 g/dL    Globulin 3.4 2.0 - 4.0 g/dL    A-G Ratio 0.8 0.8 - 1.7             No results for input(s): FIO2I, IFO2, HCO3I, IHCO3, HCOPOC, PCO2I, PCOPOC, IPHI, PHI, PHPOC, PO2I, PO2POC in the last 72 hours. No lab exists for component: IPOC2    All Micro Results     None           Ref. Range 8/10/2019 15:41   Bilirubin, total Latest Ref Range: 0.2 - 1.0 MG/DL 0.8   Protein, total Latest Ref Range: 6.4 - 8.2 g/dL 6.9   Albumin Latest Ref Range: 3.4 - 5.0 g/dL 2.9 (L)   Globulin Latest Ref Range: 2.0 - 4.0 g/dL 4.0   A-G Ratio Latest Ref Range: 0.8 - 1.7   0.7 (L)   ALT (SGPT) Latest Ref Range: 13 - 56 U/L 22   AST Latest Ref Range: 10 - 38 U/L 36   Alk.  phosphatase Latest Ref Range: 45 - 117 U/L 311 (H)       ECHO AUG 2018  Interpretation Summary     · Estimated left ventricular ejection fraction is 61 - 65%. Biplane method used to measure ejection fraction. Normal left ventricular wall motion, no regional wall motion abnormality noted. E/E' was 13.60. · There is moderate to severe aortic stenosis. The AV peak velocity was 4.05 m/s with a peak/mean gradient of 65.67/38.86 mmHg. The peak aortic velocity was measured in the apical view. Mild aortic valve regurgitation. · Left atrial cavity size is severely dilated. · Mild tricuspid valve regurgitation is present. Mild to moderate pulmonary hypertension is present. Pulmonary arterial systolic pressure is 45.43 mmHg. · Mitral valve thickening. Mild mitral annular calcification. Trace mitral valve regurgitation. Imaging:  [x]I have personally reviewed the patients chest radiographs images and report     Chest X-ray 8/29  CLINICAL INDICATION/HISTORY: s/p TIPS; elevated wbcs   TECHNIQUE: Portable frontal view of the chest was obtained. FINDINGS:  SUPPORT DEVICES: Right jugular sheath is present with its tip projecting at the  SVC. HEART AND MEDIASTINUM: Status post median sternotomy. TAVR in place. Cardiac  silhouette is within normal range in size. Aorta is slightly tortuous with mild  calcified plaque at the arch. LUNGS AND PLEURAL SPACES: Pulmonary vascular redistribution has developed. Elevation of the right hemidiaphragm is unchanged with mild adjacent linear  streaking, also unchanged, likely chronic atelectasis related to the elevated  right hemidiaphragm. No alveolar consolidation. Skinfold is present projecting  at the lateral right lung base. Focal linear scarring is present along the minor  fissure. Costophrenic angles are sharp. BONY THORAX AND SOFT TISSUES: Surgical clips are present in the right axilla. TIPS in situ. IMPRESSION:  CHF.   Chronic elevation of the right hemidiaphragm with mild chronic right basilar  atelectasis. No focal alveolar consolidation. Results from [place] encounter on 03/17/19   CT ABD PELV W CONT    Addendum Addendum: Digital Imaging and Communications in Medicine (DICOM) format  image data are available to nonaffiliated external healthcare facilities or  entities on a secure, media free, reciprocally searchable basis with patient authorization for at least a 12-month period after this study. Vinicius Aponte MD 3/17/2019  7:42 PM          Narrative EXAM: CT of the abdomen and pelvis    INDICATION: Pain. COMPARISON: 4/24/2018. TECHNIQUE: Axial CT imaging of the abdomen and pelvis was performed without  intravenous contrast. Evaluation of internal organs are limited due to lack of  intravenous contrast.  Multiplanar reformats were generated. One or more dose reduction techniques were used on this CT: automated exposure  control, adjustment of the mAs and/or kVp according to patient size, and  iterative reconstruction techniques. The specific techniques used on this CT  exam have been documented in the patient's electronic medical record.    _______________    FINDINGS:      LOWER CHEST: Lingula atelectasis. Stable right breast implant. LIVER, BILIARY: Heterogeneous enlarged liver with no evidence for mass. No  biliary dilation. Gallbladder is unremarkable. PANCREAS: Atrophied and stable. SPLEEN: Enlarged measuring 22 cm in length. ADRENALS: Normal.    KIDNEYS:   Bilateral atrophic cystic kidneys. No renal stones. No ureteral stones. No hydronephrosis. No hydroureter. Urinary bladder is distended. No bladder stone. LYMPH NODES: No enlarged lymph nodes. GASTROINTESTINAL TRACT: No bowel dilation or wall thickening. Uncomplicated  diverticula seen throughout colon. PELVIC ORGANS: Within normal limits. VASCULATURE: Aorta has a normal caliber. No periaortic collections. Mild  atherosclerotic vascular atherosclerotic disease. Dilated splenic and portal  veins. BONES: No significant change of diffuse mild sclerotic osseous structures. Multilevel spinal degenerative changes. OTHER:   No intraperitoneal free air. Significant ascites distending the abdomen. Umbilical hernia with ascites. Diffuse subcutaneous edema.    _______________      Impression IMPRESSION:    1. Findings suggestive of cirrhosis. No evidence of mass. Hepatosplenomegaly  similar to previous exam.  Portal hypertension. 2. Interval marked increase of ascites with distention of the abdomen. 3. No significant change of mottled sclerosis of osseous structures. 4. Bilateral atrophic kidneys consistent with medical renal disease. 5. Colonic diverticulosis.          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    Myelofibrosis ([facility] [address] [facility] D75.81    S/P AVR (aortic valve replacement) Z95.2    Leukemoid reaction D72.823    S/P TIPS (transjugular intrahepatic portosystemic shunt) Z95.828     Code Status Full Code        RECOMMENDATIONS:   · Pulm: stable respirations; CXR shows stable findings; incent spirometry  · Cardiac: stable hemodynamics  · ID: elevated wbcs in ascitic fluid is likely due to severe leucocytosis; ascitic fluid cx ordered; defer to Dr Brant Bernal for antibiotic advice  · Renal: watch IOs and follow renal fn; ok to sit in chair to improve voiding  · GI: s/p TIPS; mild elev in ammonia; LFTs elevated this am; defer to Dr Brant Bernal for recommendations; in the meantime, would order lactulose 20 gm bid  · Neuro: stable  · Hem: watch Hb and Plts; chronic leucocytosis with worsening; patient known to Hematology Dr Jesse Monsalve; would need to d/w Hospitalist team  · Endo: watch BG  · Fluids: NS maintenance 50 /hr  · Nutrition: oral diet  · Proph:  DVt and GI proph- SCDs and PPI   · D/w patient and updated medical management  Will defer respective systems problem management to primary and other consultant and follow patient in ICU with primary and other medical team  Further recommendations will be based on the patient's response to recommended treatment and results of the investigation ordered. Quality Care: PPI, DVT prophylaxis, HOB elevated, Infection control all reviewed and addressed.   PAIN AND SEDATION: prn low dose opiate with prn narcan  · Skin/Wound: no active issues  · Nutrition: oral diet  · Prophylaxis: DVT and GI Prophylaxis reviewed  · Restraints: none  · PT/OT eval and treat: as needed  · Lines/Tubes: PIVs; RT IJ sheath - defer to IR for removal   ADVANCE DIRECTIVE: Full Code  I have called called and left message on VM with Dr Sybil Valverde     High complexity decision making was performed in this consultation and evaluation of this patient who is at high risk for decompensation with multiple organ involvement         Ismael Mars MD

## 2019-08-30 NOTE — PROGRESS NOTES
Hospitalist Progress Note    Patient: Armin rBewer MRN: [id number]  CSN: [id number]    YOB: 1940  Age: 70 y.o. 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      Recurrent Ascites - secondary to extramedullary hematopoesis with extensive hematologic precursor cells throughout the hepatic sinusoids causing sinusoidal obstructive syndrome.    She does not have cirrhosis.     Polycythemia Vera - follows Dr. Judson Cha, takes aspirin and hydroxyurea. Ok from IR to start back on aspirin and hydroxyurea. Her wbc is coming down but still elevated. Unclear if this is secondary to hydroxyurea on hold or emergence of leukemia. Heme/onc consulted. Elevated with liver enzymes - AST/ALT increased however bili and AP same. Discussed with Dr. Zee Khoury     Aortic stenosis - s/p [facility] on 06/2019.     hepatosplenomegaly     DVT prophylaxis SCD     GI ppi    Discussed with Dr. Levon Valle    Transfer to floor    Disposition : 1-2 days    Review of systems  General: No fevers or chills. Cardiovascular: No chest pain or pressure. No palpitations. Pulmonary: No shortness of breath. Gastrointestinal: No nausea, vomiting. Physical Exam:  General: Awake, cooperative, no acute distress    HEENT: NC, Atraumatic. PERRLA, anicteric sclerae. Lungs: CTA Bilaterally. No Wheezing/Rhonchi/Rales.   Heart:  S1 S2,  No murmur, No Rubs, No Gallops  Abdomen: Soft, mild distended, Non tender.  +Bowel sounds, hepatosplenomegaly   Extremities: No c/c/e  Psych:   Not anxious or agitated. Neurologic:  No acute neurological deficit. Vital signs/Intake and Output:  Visit Vitals  /54   Pulse (!) 103   Temp 98.3 °F (36.8 °C)   Resp 20   SpO2 95%   Breastfeeding? No     Current Shift:  No intake/output data recorded. Last three shifts:  08/29 0701 - 08/30 1900  In: 1687.5 [I.V.:1687.5]  Out: 312 [Urine:825]            Labs: Results:       Chemistry Recent Labs     08/30/19  0349 08/29/19  0835   GLU 79 74    139   K 4.6 4.1    107   CO2 22 26   BUN 34* 33*   CREA 1.41* 1.27   CA 7.6* 8.5   AGAP 8 6   BUCR 24* 26*   *  --    TP 6.2*  --    ALB 2.8*  --    GLOB 3.4  --    AGRAT 0.8  --       CBC w/Diff Recent Labs     08/30/19 0349 08/29/19  0835   WBC 61.3* 69.8*   RBC 4.05* 4.67   HGB 9.2* 10.6*   HCT 31.1* 35.8   PLT 90* 118*   GRANS 65 76*   LYMPH 1* 5*   EOS 6* 3      Cardiac Enzymes No results for input(s): CPK, CKND1, ANNIE in the last 72 hours. No lab exists for component: CKRMB, TROIP   Coagulation Recent Labs     08/29/19  0835   PTP 14.8   INR 1.2   APTT 39.4*       Lipid Panel Lab Results   Component Value Date/Time    Cholesterol, total 116 11/29/2018 07:40 AM    HDL Cholesterol 35 (L) 11/29/2018 07:40 AM    LDL, calculated 67.2 11/29/2018 07:40 AM    VLDL, calculated 13.8 11/29/2018 07:40 AM    Triglyceride 69 11/29/2018 07:40 AM    CHOL/HDL Ratio 3.3 11/29/2018 07:40 AM      BNP No results for input(s): BNPP in the last 72 hours.    Liver Enzymes Recent Labs     08/30/19  0349   TP 6.2*   ALB 2.8*   *   SGOT 489*      Thyroid Studies No results found for: T4, T3U, TSH, TSHEXT     Procedures/imaging: see electronic medical records for all procedures/Xrays and details which were not copied into this note but were reviewed prior to creation of Plan

## 2019-08-31 NOTE — PROGRESS NOTES
DC Plan: Discharge home with MD follow up, family assistance    Chart reviewed as CM on call. Pt admitted by MD Liana Acosta. Pt transferred from ICU to medical. Spoke with MD Ovidio Guillen and pt may dc today. Met with pt at bedside, no friend/family present. Pt states she lives with son. States son will drive home at discharge. Home address confirmed per face sheet. Pt states she is independent. Denies using DME. States she drives. Pt noted to have mod RRAT score, offered FOC for HH and pt declined. Pt states she is eager to dc. No dc concerns identified. CM will cont to follow for any transition of care needs and will be available for via hospital , . Care Management Interventions  PCP Verified by CM: Yes  Mode of Transport at Discharge:  Other (see comment)(son)  Transition of Care Consult (CM Consult): Discharge Planning  Current Support Network: Relative's Home(lives with son)  Confirm Follow Up Transport: Self(or son)  Plan discussed with Pt/Family/Caregiver: Yes  Freedom of Choice Offered: Yes  Discharge Location  Discharge Placement: Home with family assistance

## 2019-08-31 NOTE — PROGRESS NOTES
0725 Received bedside report from night shift RN. Patient resting quietly in bed. Alert and oriented x 4. No complaint of pain/nausea. Patient removed IV fluids and SCD's. Patient stated she wanted to go home. 1336 critical lab result: WBC 56.4, slight decrease from previous. Dr Nilton Barba on floor and was notified. Patient eating all of meals without difficulty. Dr Nilton Barba put in discharge orders. Patient dressed and awaiting son. Patient Vitals for the past 24 hrs:   Temp Pulse Resp BP SpO2   08/31/19 1108 98.4 °F (36.9 °C) 84 20 134/56 98 %   08/31/19 0319 98 °F (36.7 °C) 85 18 116/59 94 %   08/30/19 2214 98.8 °F (37.1 °C) (!) 103 18 129/76 93 %   08/30/19 1903 98.3 °F (36.8 °C) (!) 103 20 125/54 95 %   08/30/19 1704 99.8 °F (37.7 °C) 95 24 123/50 97 %   08/30/19 1600 97.7 °F (36.5 °C) 98 24 135/53 99 %   08/30/19 1534 97.7 °F (36.5 °C) -- -- -- --   08/30/19 1500 -- 94 (!) 33 -- 94 %   08/30/19 1400 -- 96 24 120/90 94 %     I have reviewed discharge instructions with the patient. The patient verbalized understanding.

## 2019-08-31 NOTE — PROGRESS NOTES
Patient is transferred to floor yesterday. Per chart review, vitals remained stable and LFT elevated but stable  Patient on room air  No active pulmonary issues. Will sign off. Call as needed.     Katie Limon MD

## 2019-08-31 NOTE — DISCHARGE SUMMARY
Discharge Summary    Patient: Blessing Rome MRN: [id number]  CSN: [id number]    YOB: 1940  Age: 70 y.o.   Sex: female    DOA: 8/29/2019 LOS:  LOS: 2 days   Discharge Date:      Primary Care Provider:  Thais Hurtado MD    Admission Diagnoses: Other ascites [R18.8]  S/P TIPS (transjugular intrahepatic portosystemic shunt) [Z95.828]    Discharge Diagnoses:    Problem List as of 8/31/2019 Date Reviewed: 8/30/2019          Codes Class Noted - Resolved    Leukemoid reaction ICD-10-CM: D72.823  ICD-9-CM: 288.62  8/29/2019 - Present        S/P TIPS (transjugular intrahepatic portosystemic shunt) ICD-10-CM: K33.528  ICD-9-CM: V45.89  8/29/2019 - Present        S/P AVR (aortic valve replacement) ICD-10-CM: Z95.2  ICD-9-CM: V43.3  7/24/2019 - Present        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  ICD-9-CM: 289.89  3/27/2019 - Present    Overview Signed 3/27/2019  8:08 PM by Susan Jiang MD     Of spleen and liver             Myelofibrosis ([facility] ICD-10-CM: E20.40  ICD-9-CM: 289.83  3/27/2019 - Present        Splenomegaly ICD-10-CM: R16.1  ICD-9-CM: 789.2  10/10/2018 - Present        Ascites ICD-10-CM: R18.8  ICD-9-CM: 789.59  4/24/2018 - Present        Polycythemia vera ([facility] ICD-10-CM: D45  ICD-9-CM: 238.4  6/30/2016 - Present        RESOLVED: Lower GI bleed ICD-10-CM: K92.2  ICD-9-CM: 578.9  8/9/2019 - 8/29/2019        RESOLVED: GI bleeding ICD-10-CM: K92.2  ICD-9-CM: 578.9  7/24/2019 - 8/29/2019        RESOLVED: Cirrhosis ([facility] ICD-10-CM: K74.60  ICD-9-CM: 571.5  3/17/2019 - 3/27/2019        RESOLVED: Aortic stenosis ICD-10-CM: I35.0  ICD-9-CM: 424.1  4/24/2018 - 8/29/2019        RESOLVED: Phlebitis and thrombophlebitis of femoral vein (deep) (superficial) ([facility] ICD-10-CM: I80.10  ICD-9-CM: 451.11  7/31/2016 - 8/29/2019        RESOLVED: Left leg cellulitis ICD-10-CM: L03.116  ICD-9-CM: 682.6  7/30/2016 - 10/10/2018        RESOLVED: Cellulitis ICD-10-CM: L03.90  ICD-9-CM: 682.9 6/30/2016 - 10/10/2018        RESOLVED: Coronary artery disease involving native coronary artery ICD-10-CM: I25.10  ICD-9-CM: 414.01  6/30/2016 - 8/29/2019              Discharge Medications:     Current Discharge Medication List      START taking these medications    Details   ciprofloxacin HCl (CIPRO) 250 mg tablet Take 1 Tab by mouth every twelve (12) hours for 3 days. Qty: 6 Tab, Refills: 0         CONTINUE these medications which have CHANGED    Details   hydroxyurea (HYDREA) 500 mg capsule Take 1 Cap by mouth two (2) times daily (with meals). Qty: 30 Cap, Refills: 0         CONTINUE these medications which have NOT CHANGED    Details   aspirin delayed-release 81 mg tablet Take 81 mg by mouth daily. furosemide (LASIX) 20 mg tablet Take  by mouth daily as needed. Discharge Condition: Good    Procedures : s/p TIPS    Consults: Hematology/Oncology, Pulmonary/Critical Care and hepatology      PHYSICAL EXAM   Visit Vitals  /56 (BP 1 Location: Left arm, BP Patient Position: At rest)   Pulse 84   Temp 98.4 °F (36.9 °C)   Resp 20   Wt 61.5 kg (135 lb 9.3 oz)   SpO2 98%   Breastfeeding? No   BMI 22.22 kg/m²     General: Awake, cooperative, no acute distress    HEENT: NC, Atraumatic. PERRLA, EOMI. Anicteric sclerae. Lungs:  CTA Bilaterally. No Wheezing/Rhonchi/Rales. Heart:  Regular  rhythm,  No murmur, No Rubs, No Gallops  Abdomen: Soft, Non distended, Non tender. +Bowel sounds,   Extremities: No c/c/e  Psych:   Not anxious or agitated. Neurologic:  No acute neurological deficits. Admission HPI :   Diana Logan is a 70 y.o. female who has past history of polycythemia vera, myelofibrosis, hepatosplenomegaly, aortic stenosis s/p TAVR is admitted s/p TIPS for recurrent ascites. She has been requiring frequent paracentesis recently. She does not have cirrhosis. She was recently admitted to this hospital for GI bleed. Her procedure went well.      Hospital Course :   Ms. Fadi James was monitored overnight in ICU s/p TIPS she did not had any acute events post procedure. 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    She does not have cirrhosis. S/p TIPS, follow up with hepatology in one week for follow up [id number] - follows Dr. Greta Salvador, takes aspirin and hydroxyurea. She stopped aspirin and hydroxyurea a week before procedure. Her wbc is coming down but still elevated. Seen by heme/onc, her hydroxyurea doubled. She will follow up with Dr. Greta Salvador in one week.       Elevated with liver enzymes - AST/ALT increased however bili and AP same. Discussed with Dr. Raoul Banuelos. She will follow up with him next week.     Aortic stenosis - s/p TAVR at [facility] on 06/2019. She is eager to go home, advised follow up with heme/onc and hepatology    Activity: Activity as tolerated    Diet: Regular Diet    Follow-up: hepatology, heme/onc    Disposition: home    Minutes spent on discharge: 45       Labs: Results:       Chemistry Recent Labs     08/31/19  0250 08/30/19  0349 08/29/19  0835   GLU 60* 79 74    139 139   K 4.3 4.6 4.1    109 107   CO2 20* 22 26   BUN 36* 34* 33*   CREA 1.32* 1.41* 1.27   CA 7.7* 7.6* 8.5   AGAP 10 8 6   BUCR 27* 24* 26*   * 306*  --    TP 5.9* 6.2*  --    ALB 2.7* 2.8*  --    GLOB 3.2 3.4  --    AGRAT 0.8 0.8  --       CBC w/Diff Recent Labs     08/31/19  0250 08/30/19  0349 08/29/19  0835   WBC 56.4* 61.3* 69.8*   RBC 3.96* 4.05* 4.67   HGB 9.0* 9.2* 10.6*   HCT 31.0* 31.1* 35.8   PLT 83* 90* 118*   GRANS  --  65 76*   LYMPH  --  1* 5*   EOS  --  6* 3      Cardiac Enzymes No results for input(s): CPK, CKND1, ANNIE in the last 72 hours.     No lab exists for component: CKRMB, TROIP   Coagulation Recent Labs     08/29/19  0835   PTP 14.8   INR 1.2   APTT 39.4*       Lipid Panel Lab Results   Component Value Date/Time    Cholesterol, total 116 11/29/2018 07:40 AM    HDL Cholesterol 35 (L) 11/29/2018 07:40 AM    LDL, calculated 67.2 11/29/2018 07:40 AM    VLDL, calculated 13.8 11/29/2018 07:40 AM    Triglyceride 69 11/29/2018 07:40 AM    CHOL/HDL Ratio 3.3 11/29/2018 07:40 AM      BNP No results for input(s): BNPP in the last 72 hours. Liver Enzymes Recent Labs     08/31/19  0250   TP 5.9*   ALB 2.7*   *   SGOT 439*      Thyroid Studies No results found for: T4, T3U, TSH, TSHEXT         Significant Diagnostic Studies: Nm Acute Gi Bleed Scan    Result Date: 8/9/2019  EXAM: Nuclear medicine acute GI bleed scan INDICATION: GI bleed Past 3 weeks COMPARISON: CT dated March 17, 2019 _______________ FINDINGS: Following the administration of 27.9 mCi of technetium 99m UltraTag red blood cells injected in the left antecubital fossa, abdominal imaging was performed. This study is limited due to extensive uptake of radiotracer in a markedly enlarged spleen, which obscures a significant portion of the left side of the abdomen. Given this limitation no abnormal foci of increased radiotracer activity seen which increases with time or travels to suggest intraluminal hemorrhage. _______________     IMPRESSION: Markedly enlarged spleen with increased radiotracer activity limiting evaluation. The visualized portions the abdomen demonstrate no convincing evidence for acute intraluminal hemorrhage however as described above spleen limits evaluation. Us Guide Paracentesis    Result Date: 8/29/2019  PROCEDURE: ULTRASOUND GUIDED PARACENTESIS INDICATION: Recurrent ascites TECHNIQUE AND FINDINGS: Informed consent was obtained from the patient prior to the procedure. Standard pre-procedure timeout at 1023 hours. Preliminary ultrasound of the abdomen shows a large amount of ascites, ultrasound image saved in PACS. A left lower quadrant approach was chosen.   The skin was prepped and draped in usual sterile fashion. Sterile ultrasound gel and a sterile US probe cover. Skin and underlying soft tissue was anesthetized with 1% lidocaine. A 5 [place] Yueh sheathed needle was advanced into the peritoneal cavity, under direct US visualization, was connected to a vacuum, and a total of 3.91 liters of serosanguineous fluid was removed. A specimen was collected and taken to the lab, as requested. The patient tolerated the procedure well, without complications. Standard post procedure pause. GUIDANCE: Ultrasound guidance was used to position (and confirm the position of) the Yueh sheathed needle. Image(s) saved in PACS: Ultrasound _______________     IMPRESSION: SUCCESSFUL ULTRASOUND-GUIDED PARACENTESIS WITH REMOVAL OF 3.91 LITERS OF FLUID. OF NOTE, THE PATIENT UNDERWENT 10 L PARACENTESIS JUST 2 DAYS AGO. THIS PROCEDURE WAS IMMEDIATELY FOLLOWED BY A TIPS PROCEDURE, REPORTED SEPARATELY. Us Guide Paracentesis    Result Date: 8/27/2019  PROCEDURE: ULTRASOUND GUIDED PARACENTESIS INDICATION: Recurrent ascites TECHNIQUE AND FINDINGS: Informed consent was obtained from the patient prior to the procedure. Standard pre-procedure timeout at 1504 hours. Preliminary ultrasound of the abdomen shows a large amount of ascites, ultrasound image saved in PACS. A right lower quadrant approach was chosen. The skin was prepped and draped in usual sterile fashion. Sterile ultrasound gel and a sterile US probe cover. Skin and underlying soft tissue was anesthetized with 1% lidocaine. A 5 [place] Yueh sheathed needle was advanced into the peritoneal cavity, under direct US visualization, was connected to a vacuum, and a total of 10 liters of fluid was removed. 4 the first 8 L the fluid was clear yellow. It then became blood-tinged and was intermittently more bloody. Procedure stopped at 10 L by technologist. Follow-up ultrasound shows small volume residual ascites. A specimen was collected and taken to the lab, as requested.   The patient tolerated the procedure well, without complications. Standard post procedure pause. GUIDANCE: Ultrasound guidance was used to position (and confirm the position of) the Yueh sheathed needle. Image(s) saved in PACS: Ultrasound _______________     IMPRESSION: SUCCESSFUL ULTRASOUND-GUIDED PARACENTESIS WITH REMOVAL OF 10 LITERS OF FLUID. THE FIRST 8 L WAS CLEAR YELLOW. THE LAST 2 L WERE INTERMITTENTLY BLOODY AND SOMETIMES HEAVIER BLEEDING. THIS LIKELY IS DUE TO THE YUEH CATHETER SUCKING UP AGAINST A BOWEL WALL VEIN. PATIENT WILL BE OBSERVED FOR AN EXTRA HOUR. SHE WILL RETURN IN 2 DAYS FOR TIPS PROCEDURE. WALL PERFORM PRE-TIPS ULTRASOUND TO SEE IF THERE IS ENOUGH FLUID FOR ADDITIONAL PARACENTESIS. Xr Chest Port    Result Date: 8/30/2019  EXAM: CHEST RADIOGRAPH, SINGLE VIEW CLINICAL INDICATION/HISTORY: s/p TIPS; elevated wbcs      <Additional:  None. COMPARISON: 316 TECHNIQUE: Portable frontal view of the chest was obtained. _______________ FINDINGS: SUPPORT DEVICES: Right jugular sheath is present with its tip projecting at the SVC. HEART AND MEDIASTINUM: Status post median sternotomy. TAVR in place. Cardiac silhouette is within normal range in size. Aorta is slightly tortuous with mild calcified plaque at the arch. LUNGS AND PLEURAL SPACES: Pulmonary vascular redistribution has developed. Elevation of the right hemidiaphragm is unchanged with mild adjacent linear streaking, also unchanged, likely chronic atelectasis related to the elevated right hemidiaphragm. No alveolar consolidation. Skinfold is present projecting at the lateral right lung base. Focal linear scarring is present along the minor fissure. Costophrenic angles are sharp. BONY THORAX AND SOFT TISSUES: Surgical clips are present in the right axilla. TIPS in situ. _______________     IMPRESSION: CHF. Chronic elevation of the right hemidiaphragm with mild chronic right basilar atelectasis. No focal alveolar consolidation.     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 TIPS requested. STERILE TECHNIQUE: The procedure was performed following standard maximal barrier technique which includes, cap, mask, sterile gown, sterile gloves, sterile full body drape, hand hygiene with alcohol foam, and 2% chlorhexidine for cutaneous antisepsis. Sterile ultrasound gel and a sterile US probe cover. TECHNIQUE AND FINDINGS:  A pre-TIPS paracentesis was performed with drainage of 3.91 liters of fluid and will be reported separately. A 10 L paracentesis was performed just 2 days ago. Ultrasound evaluation of potential access sites was performed. Sterile US gel and a sterile US probe cover were used. After successfully identifying a patent right internal jugular vein a permanent recording was created for the patient record. The right internal jugular vein was punctured under direct ultrasound guidance and a guide wire was passed to the right atrium followed by placement of a 10 [place] introducer sheath which was directed through the right atrium to the inferior vena cava. The right hepatic vein was selectively catheterized and a pre-TIPS hepatic venogram showed a widely patent hepatic vein. An over-the-wire Jaye thrombectomy balloon was then used to perform an occlusion hepatic venogram. This shows filling of the hepatic vein branches but did not retrograde fill the portal vein. The 16 gauge [place] needle was then passed through the sheath and exited from the central portion of the hepatic vein into the liver parenchyma. A branch of the right portal vein was cannulated but initially the guidewire could not be passed down the main portal vein. A 5 K[place]ili catheter was advanced over the wire and into the portal vein. Contrast injection confirms position within a branch of the right portal vein. A stiff shaft Glidewire was eventually directed around a loop and down the portal vein and out into the superior mesenteric vein and then the splenic vein. A 5 Irish catheter was then passed over the wire and a pre-TIPS splenoportogram was performed. This shows a massively dilated splenic vein, superior mesenteric and portal vein. No significant varices visualized. The introducer sheath was then passed over the catheter and into the main portal vein and pre-TIPS pressure measurements were obtained. The portal vein pressure was 27 mmHg and the right atrial pressure mean of 15 mmHg for a portal vein-right atrial gradient of 12 mmHg. The tract between the hepatic vein and the portal vein was dilated with an 8 mm diameter balloon. This was followed by placement of a [place] Viatorr constrained stentgraft measuring 8 cm in its covered portion and 2 cm in the uncovered portion. The uncovered portion was placed in the portal vein, just above the bifurcation, and the covered portion in the parenchymal tract extending back into the hepatic vein, flush with the inferior vena cava. The stent graft was post dilated to 8 mm in diameter. Follow-up splenoportogram shows a widely patent TIPS with preserved flow in the intrahepatic portal vein branches. Repeat pressure measurements in the portal vein and right atrium reveal a gradient of 10 mmHg. The stentgraft was then dilated to 10 mm diameter. Repeat pressure measurements reveal a portal vein--right atrial gradient of 8 mm Hg. Repeat splenoportogram shows a widely patent TIPS with continued flow in the main portal vein and intrahepatic branches. The 10 Irish introducer sheath was then exchanged over a wire for a 10 Irish short introducer sheath, the tip in the superior vena cava. The patient tolerated the procedure well, without complications.   The patient was extubated prior to leaving the angiography suite. FLUOROSCOPY DETAILS: Time:  30.3 minutes. Dose (reference air kerma):  1450 mGy. MEDICATIONS:  During the procedure general endotracheal anesthesia was provided by the anesthesiology service. _______________    IMPRESSION: SUCCESSFUL PLACEMENT OF A TIPS EXTENDING FROM THE RIGHT HEPATIC VEIN INTO THE PORTAL VEIN WITH REDUCTION OF THE PRE-TIPS GRADIENT OF 12 MMHG DOWN TO 8 MMHG, A GOOD HEMODYNAMIC RESPONSE. THE FOLLOW-UP TIPS VENOGRAM SHOWS A WELL-POSITIONED TIPS WITH PRESERVED FLOW IN THE INTRAHEPATIC PORTAL VEIN BRANCHES. THE SPLENIC VEIN, SUPERIOR MESENTERIC VEIN, AND PORTAL VEIN ARE ALL MASSIVELY DILATED. RECOMMEND OBTAINING A DUPLEX OF THE TIPS IN ABOUT 5 DAYS TO CONFIRM PATENCY AND ESTABLISH A BASELINE. Imaging Outpatient Consult/referral    Addendum Date: 8/21/2019    Addendum: MELD Score of 8, based on my recent labs    Result Date: 8/21/2019  OUTPATIENT CONSULTATION FOR TRANSJUGULAR INTRAHEPATIC PORTOSYSTEMIC SHUNT (TIPS) Requesting physician: [facility] Chief Complaint: Intractable ascites History of Present Illness: 42-year-old  female with history of polycythemia vera/myelofibrosis. Since March 2019 she has been coming in for monthly large volume paracentesis, ranging from 8.7 up to 15.6 L. She has previously been treated with diuretics without improvement. No history of encephalopathy or bleeding esophageal or gastric varices. CT scan from 3/17/2019 showed marked hepatosplenomegaly with a patent and very dilated splenic vein and portal vein. On 3/22/2019 she underwent transjugular liver biopsy and hepatic vein pressure measurements revealing a portosystemic gradient of 12 mmHg. 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 In June of this year she underwent TAVR for severe aortic stenosis. On 8/10/2019 she was admitted with lower GI bleeding found due to external hemorrhoids.  An upper endoscopy from 8/11/2019 showed 2 small distal esophageal varices and portal hypertensive gastropathy. She also complains of an umbilical hernia which is particularly bothersome prior to paracentesis. I discussed the TIPS procedure with the patient and her son at length and answered all of their questions. 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 A TIPS procedure would be expected to adequately decompress her portal vein for management of the ascites. I informed her that a quick Google search revealed only one case report using TIPS in this situation, with a successful outcome. She and her son are interested in pursuing this procedure and she is scheduled for 8/29/2019. Will bring her in a few days earlier for outpatient paracentesis. 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 I suspect a component of her portal hypertension may be due to increased inflow from the massive splenomegaly. She is scheduled for TIPS in 1 week. No results found for this or any previous visit.         CC: Andrez Merchant MD

## 2019-08-31 NOTE — CONSULTS
Hematology/Oncology Consultation:    Assessment/Plan:  1) Polycythemia Vera- with leukocytosis, 2+ myelofibrosis (BMBx 10/02/2018- > 95% cellularity and 2+ myelofibrosis. Trilinear), massive hepatosplenomegaly and recurrent ascites. Receives Hydrea and ASA, followed at [facility] by Dr. Julia Sharma and by Hepatology, Dr. Neeru Mcnally. Secondary to multiple procedures she has been off Hydrea for the past two weeks. Current WBC is 61.3K with ANC of 39.8 K. Myelocytes and metamyelocytes are seen, Note pt has Hepatosplenomegaly and portal HTN with significant sequestration of WBC's and extrahepatic hematopoiesis. Now s/p TIPS 8/29/2019. WBC surged up to 69.8 K, now 61.3 K. Hydrea has been restarted, will increase to 500 mg PO BID for now, follow WBC trend. 2) Ascites/Edema/Portal HTN and massive Hepatosplenomegally- chronically distended since 2017. She has received diuretics and paracenteses. S/p TIPS POD #2.  3) possible UTI- urine Cx pending. 4) F/up with Dr. Mildred Devlin, [facility] [phone] next week, Check CBC and adjust Hydrea dosing. CC: 71 yo female seen at the request of Dr. Neeru Mcnally to evaluate her known Polycythemia. HPI:        Antony Read is a 71 yo female initially diagnosed with Polycythemia vera by Dr. Sheryle Roa 12/2016. She initially received several phlebotomies followed by Jakafi at 10 mg PO BID without any dose modifications. Yvonne Constable was discontinued after 6 months, noting no diminishment of splenomegaly. She was then transitioned to Hydrea  mg PO Q Day and was receiving ASA 81 mg PO Q Day. Last seen at [facility] on 4/10/2019. Labs 4/10/2019 with WBC 21.7, Hgb 8.7 and plts 219 K. She is s/p liver biopsy in  3/22/2019 which revealed periportal fibrosis and extensive extramedulary hematopoesis throughout the sinusoids with sinusoidal congestion. No evidence of cirrhosis.        Recurrent ascites s/p paracenteses: 3/18/2019, 3/22/2019, 4/25/2019, 5/24/2019, 6/25/2019, 7/26/2019, 8/27/2019 and 8/29/2019. Aortic Stenosis- AV replacement with TAVR  6/26/2019. Admitted in 8/11/2019 for LGI bleeding . EGD 8/11/2019 revealed two small distal esophageal varices, and body and fundus of stomach showed portal hypertensive gastropathy. Recommended PPI. Colonoscopy 8/11/2019 showed large bleeding external hemorrhoids. Nuclear RBC scan was negative for active bleeding. Admitted 8/21/2019 for TIPS due to refractory ascites and portal hypertension. S/p paracentesis 3.9 L on 8/29/2019 followed by TIPS procedure 8/29/2019 with subsequent transaminitis (, ). WBC was 69.8 on 8/29/2019, PBS revealed neutrophilic leukocytosis with immature forms, reactive. Chronic leukocytosis, WBC 21.7 noted 4/10/2019 at [facility]. WBC was 31.3 on 8/10/2019. Past Medical History:   Diagnosis Date    Aortic stenosis      Arrhythmia      Breast CA ([address]      CAD (coronary artery disease)       cabg 20 yrs ago  2019 leaking heart valve to be repaired 6/26/19    Cancer ([address] 2004     breast, rt    Cellulitis of right leg      Ill-defined condition       polocythemia vera    Liver disease       swollen liver-swollen spleen-hernia 2017    Polycythemia vera ([address] 2016    Thromboembolus ([address] 06/2016     DVT left leg            Past Surgical History:   Procedure Laterality Date    BREAST SURGERY PROCEDURE UNLISTED   2005     part. rt.mastectomy    CARDIAC SURG PROCEDURE UNLIST         2x bypass 20 years ago    CHEST SURGERY PROCEDURE UNLISTED        COLONOSCOPY N/A 8/11/2019     COLONOSCOPY performed by Davin Tineo [facility] at [facility]    HX AORTIC VALVE REPLACEMENT   06/2019    HX CORONARY ARTERY BYPASS GRAFT   1996    HX HEENT         cataracts    IR BX TRANSCATHETER   3/22/2019    IR INSERT TIPS HEPATIC SHUNT   8/29/2019              Prior to Admission medications    Medication Sig Start Date End Date Taking?  Authorizing Provider   aspirin delayed-release 81 mg tablet Take 81 mg by mouth daily.     Yes Provider, Historical   hydroxyurea (HYDREA) 500 mg capsule Take 500 mg by mouth daily.       Provider, Historical   furosemide (LASIX) 20 mg tablet Take  by mouth daily as needed.       Provider, Historical      No Known Allergies   Social History            Tobacco Use    Smoking status: Never Smoker    Smokeless tobacco: Never Used   Substance Use Topics    Alcohol use: Yes       Frequency: Never       Comment: 2 glasses per month- or less            Family History   Problem Relation Age of Onset    Migraines Maternal Grandmother      Cancer Mother      Cancer Father                  Current Facility-Administered Medications   Medication Dose Route Frequency    0.9% sodium chloride infusion  25 mL/hr IntraVENous CONTINUOUS    ceFAZolin (ANCEF) 2 g/20 mL in sterile water IV syringe  2 g IntraVENous ONCE    lactated Ringers infusion  50 mL/hr IntraVENous CONTINUOUS    ondansetron (ZOFRAN) injection 4 mg  4 mg IntraVENous ONCE    iopamidol (ISOVUE 300) 61 % contrast injection 1-150 mL  1-150 mL IntraVENous RAD CONTINUOUS      Review of Systems:  Ears, nose, mouth, throat, and face: No epistaxis, No nasal drainage, no difficulty in swallowing  Respiratory: mild SOB  Cardiovascular: no chest pain or palpitations, no chronic leg edema, no syncope  Gastrointestinal: no abd pain, vomitting or diarrhea, no bleeding symptoms; chronic ascites with repeated paracentesis  Genitourinary: No urinary symptoms  Integument/breast: No ulcers  Musculoskeletal:Negative  Neurological: No focal weakness or seizures or headaches  Behvioral/Psych: No anxiety or depression  Constitutional: No fever or chills or weight loss or night sweats     Visit Vitals  /59   Pulse 85   Temp 98 °F (36.7 °C)   Resp 18   Wt 61.5 kg (135 lb 9.3 oz)   SpO2 94%   Breastfeeding?  No   BMI 22.22 kg/m²     Date 08/30/19 0700 - 08/31/19 0659 08/31/19 0700 - 09/01/19 0659   Shift 0822-8984 3616-1308 24 Hour Total 2922-08084 2988-6616 24 Hour Total   INTAKE   P.O.  200 200        P. O.  200 200      Shift Total(mL/kg)  200(3.3) 200(3.3)      OUTPUT   Urine 375 300(0.4) 675(0.5)        Urine 375 200 575        Urine Voided  100 100      Stool           Stool Occurrence(s)  1 x 1 x      Shift Total(mL/kg) 375 300(4.9) 675(11)      NET -375 -100 -475      Weight (kg)  61.5 61.5 61.5 61.5 61.5     Physical Exam:   General: thin and frail   HEENT: pupils reactive, no scleral jaundice, moist oral mucosa  Neck: No adenopathy or thyroid swelling, supple  Heart: S1S2 no murmurs; no JVD. Lungs: CTA bilaterally, no wheezes or crackles  Abdomen: soft, non-tender, + splenomegaly, mild abd distension (+ascites), no guarding or rigidity, + bowel sounds   Extremities: no leg edema or swelling or clubbing. Pulses: 2+ throughout.   Lymphadenopathy: none  Skin: no rash  Neuro: AAO x 4, non-focal exam    Laboratory:  Recent Results (from the past 24 hour(s))   URINALYSIS W/MICROSCOPIC    Collection Time: 08/31/19 12:05 AM   Result Value Ref Range    Color YELLOW      Appearance CLOUDY      Specific gravity >1.030 (H) 1.005 - 1.030    pH (UA) 5.0 5.0 - 8.0      Protein 100 (A) NEG mg/dL    Glucose NEGATIVE  NEG mg/dL    Ketone NEGATIVE  NEG mg/dL    Bilirubin NEGATIVE  NEG      Blood TRACE (A) NEG      Urobilinogen 1.0 0.2 - 1.0 EU/dL    Nitrites NEGATIVE  NEG      Leukocyte Esterase MODERATE (A) NEG      WBC 40 to 50 0 - 5 /hpf    RBC 4 to 8 0 - 5 /hpf    Epithelial cells 2 to 3 0 - 5 /lpf    Bacteria FEW (A) NEG /hpf    Granular cast 1 to 2 NEG /lpf   AMMONIA    Collection Time: 08/31/19  2:50 AM   Result Value Ref Range    Ammonia 67 (H) 11 - 32 UMOL/L   METABOLIC PANEL, COMPREHENSIVE    Collection Time: 08/31/19  2:50 AM   Result Value Ref Range    Sodium 141 136 - 145 mmol/L    Potassium 4.3 3.5 - 5.5 mmol/L    Chloride 111 100 - 111 mmol/L    CO2 20 (L) 21 - 32 mmol/L    Anion gap 10 3.0 - 18 mmol/L    Glucose 60 (L) 74 - 99 mg/dL    BUN 36 (H) 7.0 - 18 MG/DL    Creatinine 1.32 (H) 0.6 - 1.3 MG/DL    BUN/Creatinine ratio 27 (H) 12 - 20      GFR est AA 47 (L) >60 ml/min/1.73m2    GFR est non-AA 39 (L) >60 ml/min/1.73m2    Calcium 7.7 (L) 8.5 - 10.1 MG/DL    Bilirubin, total 0.8 0.2 - 1.0 MG/DL    ALT (SGPT) 247 (H) 13 - 56 U/L    AST (SGOT) 439 (H) 10 - 38 U/L    Alk. phosphatase 342 (H) 45 - 117 U/L    Protein, total 5.9 (L) 6.4 - 8.2 g/dL    Albumin 2.7 (L) 3.4 - 5.0 g/dL    Globulin 3.2 2.0 - 4.0 g/dL    A-G Ratio 0.8 0.8 - 1.7         Current Facility-Administered Medications:     lactulose (CHRONULAC) 10 gram/15 mL solution 30 mL, 20 g, Oral, BID, Emigdio Urbina MD, 30 mL at 08/30/19 1204    HYDROmorphone (PF) (DILAUDID) injection 0.5 mg, 0.5 mg, IntraVENous, Q10MIN PRN, Kerrie Pro MD    naloxone [facility]) injection 0.1 mg, 0.1 mg, IntraVENous, EVERY 2 MINUTES AS NEEDED, Kerrie Pro MD    iopamidol (ISOVUE 300) 61 % contrast injection 1-150 mL, 1-150 mL, IntraVENous, RAD CONTINUOUS, Luiz Pineda MD, 330 mL at 08/29/19 1130    oxyCODONE-acetaminophen (PERCOCET) 5-325 mg per tablet 1 Tab, 1 Tab, Oral, Q4H PRN, Luiz Pineda MD    ondansetron (ZOFRAN) injection 4 mg, 4 mg, IntraVENous, Q6H PRN, Luiz Pineda MD    0.9% sodium chloride infusion, 50 mL/hr, IntraVENous, CONTINUOUS, Wenceslao Alvarez MD, Last Rate: 50 mL/hr at 08/31/19 0011, 50 mL/hr at 08/31/19 0011    aspirin delayed-release tablet 81 mg, 81 mg, Oral, DAILY, Sharon Raymond MD, 81 mg at 08/30/19 0910    hydroxyurea (HYDREA) chemo cap 500 mg, 500 mg, Oral, DAILY, Sharon Raymond MD, 500 mg at 08/30/19 0912    pantoprazole (PROTONIX) tablet 40 mg, 40 mg, Oral, ACB, Emigdio Urbina MD, 40 mg at 08/31/19 384    Facility-Administered Medications Ordered in Other Encounters:     albumin human 25% (BUMINATE) solution 25 g, 25 g, IntraVENous, PRN, Adalberto Pineda MD Marcina Feather.  [facility], [address] [place] [address]  [phone]  Pager [phone]

## 2019-08-31 NOTE — ROUTINE PROCESS
Bedside and Verbal shift change report given to Chucho Muhammad RN by Yanira Boss RN. Report included the following information SBAR and Kardex.

## 2019-08-31 NOTE — PROGRESS NOTES
2033 - Head to toe assessment completed at this time. Pt denies CP. SOB, and pain. 20G Right FA IVF infusing as ordered. Marrero draining stewart urine. SCDs in place bilaterally as ordered. Call bell and personal belongings within reach. Will continue to monitor. 2200 - Patient resting in bed watching tv. No distress noted or concerns voiced. Call bell and personal belongings within reach. Will continue to monitor. [phone] - This writer spoke to on call MD Delgado in reference to patient c/o flank discomfort from catheter. Verbal orders received as follows; UA, C&S, remove marrero catheter, bladder scan in 6 hours and if residual is 350 or greater straight cath patient. 0000 - Urine sample obtained and marrero catheter discontinued per MD order. Will continue to monitor patient. 0200 - Patient resting on bed with eyes closed. Respirations even and unlabored. Call bell within reach. 0600 - Patient was bladder scan and it revealed that the residual was greater 350 ml. Patient was straight cathed and 200 ml or stewart urine was drained from bladder. Call bell within reach. Will continue to monitor.

## 2019-09-10 PROBLEM — D64.9 ANEMIA: Status: ACTIVE | Noted: 2019-01-01

## 2019-09-10 PROBLEM — J18.9 PNEUMONIA: Status: ACTIVE | Noted: 2019-01-01

## 2019-09-10 PROBLEM — K74.60 CIRRHOSIS (HCC): Status: ACTIVE | Noted: 2019-01-01

## 2019-09-10 NOTE — PROGRESS NOTES
Reason for Readmission:   Direct admit from physician office; fatigue               RRAT Score:   high               Resources/supports as identified by patient/family:                   Top Challenges facing patient (as identified by patient/family and CM): Finances/Medication cost?                    Transportation? Support system or lack thereof? Living arrangements? Self-care/ADLs/Cognition? Current Advanced Directive/Advance Care Plan: On file                          Plan for utilizing home health:   [facility] [place] vs SNF                  Transition of Care Plan:   [place] and physician follow up vs SNF               Chart reviewed. Per H&P \"Agustina Momin is a 70 y.o. female who hx of cirrhosis, ploycythemia vera , avr is a direct admission from Dr. Amrit Leon office. She visited Dr. Sobia Blevins today and reported fatigue, cough, sob on exertion. She was so weak and unable to come to the lab done. \"    Noted pt with therapy evaluations pending. CM to await outcome of therapy evaluation to assist with identifying transition of care needs. CM to continue to follow and assist.    Care Management Interventions  PCP Verified by CM:  Yes  Transition of Care Consult (CM Consult): Discharge Planning  Health Maintenance Reviewed: Yes  Physical Therapy Consult: Yes  Occupational Therapy Consult: Yes  Current Support Network: Relative's Home  Confirm Follow Up Transport: Family  Discharge Location  Discharge Placement: Home with home health(vs SNF)

## 2019-09-10 NOTE — PROGRESS NOTES
Pt received via [facility] Direct admission from Dr Natalio Nieves office. Pt accompanied by male family member. Pt AO x4 . Primary Nurse Anastacia Diaz RN and Carlos Manuel Martin RN performed a dual skin assessment on this patient No impairment noted other than expected scattered ecchymosis BUE. Pt on ASA at home. Pt familiar with room,call bell and TV. No c/o pain. Will continue to monitor and provide care.

## 2019-09-10 NOTE — PROGRESS NOTES
Pharmacy Dosing Services: Vancomycin    Consult for Vancomycin Dosing by Pharmacy by Dr. Jeremiah Menard provided for this 70y.o. year old female , for indication of pneumonia (CAP). Day of Therapy 1 of 7    Ht Readings from Last 1 Encounters:   09/10/19 165.1 cm (65\")        Wt Readings from Last 1 Encounters:   09/10/19 56.7 kg (125 lb)      Other Current Antibiotics Zosyn 3.375 grams IV q6h   Significant Cultures N/A   Serum Creatinine Lab Results   Component Value Date/Time    Creatinine 1.01 09/10/2019 01:40 PM    Creatinine, POC 1.2 10/28/2014 08:24 AM        Creatinine Clearance Estimated Creatinine Clearance: 41.8 mL/min (based on SCr of 1.01 mg/dL). BUN Lab Results   Component Value Date/Time    BUN 38 (H) 09/10/2019 01:40 PM        WBC Lab Results   Component Value Date/Time    WBC 15.6 (H) 09/10/2019 01:40 PM        H/H Lab Results   Component Value Date/Time    HGB 9.2 (L) 09/10/2019 01:40 PM        Platelets Lab Results   Component Value Date/Time    PLATELET 998 (L) 12/52/3808 01:40 PM        Temp 97.7 °F (36.5 °C)     Start therapy with dose:  Vancomycin 1000 mg IV q24h, scheduled for 9/10/19 at 16:00. Goal therapeutic trough of 15 - 20 mcg/mL. Pharmacy to follow daily and will make changes to dose and/or frequency based on clinical status.   Pharmacist BING Hernandez

## 2019-09-10 NOTE — H&P
History & Physical    Patient: Dariana Saini MRN: [id number]  CSN: [id number]    YOB: 1940  Age: 70 y.o. Sex: female      DOA: 9/10/2019  Primary Care Provider:  Paul Harrington MD      Assessment/Plan     Hospital Problems  Date Reviewed: 8/30/2019          Codes Class Noted POA    Anemia ICD-10-CM: D64.9  ICD-9-CM: 285.9  9/10/2019 Unknown        Cirrhosis ([facility] ICD-10-CM: K74.60  ICD-9-CM: 571.5  9/10/2019 Unknown        Pneumonia ICD-10-CM: J18.9  ICD-9-CM: 039  9/10/2019 Unknown        S/P TIPS (transjugular intrahepatic portosystemic shunt) ICD-10-CM: F50.896  ICD-9-CM: V45.89  8/29/2019 Yes        S/P AVR (aortic valve replacement) ICD-10-CM: Z95.2  ICD-9-CM: V43.3  7/24/2019 Yes        Myelofibrosis ([facility] ICD-10-CM: D75.81  ICD-9-CM: 289.83  3/27/2019 Yes        Splenomegaly ICD-10-CM: R16.1  ICD-9-CM: 789.2  10/10/2018 Yes        Ascites ICD-10-CM: R18.8  ICD-9-CM: 789.59  4/24/2018 Yes        Polycythemia vera ([facility] ICD-10-CM: D45  ICD-9-CM: 238.4  6/30/2016 Yes                Admit to medical     pna   Will start vac/zosyn   Breathing tx as needed   Will have echo     Ascites /cirrhosis , s/p tips   Will have abdomen ct for evaluation  F/u with dr. Ramona Ball, will have him on board  Will give some albumin  Ammonia level daily     Anemia-stable at the baseline        Polycythemia  Vera /myofibrosis, splenomegaly   Continue hydrea and aspirin     S/p arv   Per sentera , so far stable              AC: I have had a discussion with patient regarding code status. Particularly, described potential options in event of cardiac or respiratory arrest. I have explained what being full code entails, including cardiorespiratory resuscitation attempts with chest compressions, potential cariodioversion/ \" shocks\" as well as intubation. I have also explained Do not resuscitate which would mean we allow a natural death with out aggressive interventions.   Dnr/I AC 32 min       Estimate  length of stay : 2-3 days     DVT : heparin  ppi proph  CC: fatigue        HPI:     Isidoro Gee is a 70 y.o. female who hx of cirrhosis, ploycythemia vera , avr is a direct admission from Dr. Naye Dave office. She visited Dr. Fabián Sellers today and reported fatigue, cough, sob on exertion. She was so weak and unable to come to the lab done. Denies any slurred speech/headache/cp/n/v/blurred vission/d/c/palpitation/gait change/bleeding. Denies smoking/ any alcohol or drug use. Visit Vitals  /41   Pulse 87   Temp 98.2 °F (36.8 °C)   Resp 15   Ht 5' 5\" (1.651 m)   Wt 56.7 kg (125 lb)   SpO2 98%   BMI 20.80 kg/m²             Past Medical History:   Diagnosis Date    Aortic stenosis     Arrhythmia     Breast CA (HCC)     CAD (coronary artery disease)     cabg 20 yrs ago  2019 leaking heart valve to be repaired 6/26/19    Cancer ([address] 2004    breast, rt    Cellulitis of right leg     Ill-defined condition     polocythemia vera    Liver disease     swollen liver-swollen spleen-hernia 2017    Polycythemia vera ([address] 2016    Thromboembolus ([address] 06/2016    DVT left leg       Past Surgical History:   Procedure Laterality Date    BREAST SURGERY PROCEDURE UNLISTED  2005    part.  rt.mastectomy    CARDIAC SURG PROCEDURE UNLIST      2x bypass 20 years ago    CHEST SURGERY PROCEDURE UNLISTED      COLONOSCOPY N/A 8/11/2019    COLONOSCOPY performed by Maverick Tony MD at [facility] ENDOSCOPY    HX AORTIC VALVE REPLACEMENT  06/2019    HX CORONARY ARTERY BYPASS GRAFT  1996    HX HEENT      cataracts    IR BX TRANSCATHETER  3/22/2019    IR INSERT TIPS HEPATIC SHUNT  8/29/2019       Family History   Problem Relation Age of Onset    Migraines Maternal Grandmother     Cancer Mother     Cancer Father        Social History     Socioeconomic History    Marital status:      Spouse name: Not on file    Number of children: Not on file    Years of education: Not on file    Highest education level: Not on file   Tobacco Use    Smoking status: Never Smoker    Smokeless tobacco: Never Used   Substance and Sexual Activity    Alcohol use: Yes     Frequency: Never     Comment: 2 glasses per month- or less    Drug use: No    Sexual activity: Not Currently   Other Topics Concern       Prior to Admission medications    Medication Sig Start Date End Date Taking? Authorizing Provider   hydroxyurea (HYDREA) 500 mg capsule Take 1 Cap by mouth two (2) times daily (with meals). 19  Yes Tylor Medina MD   furosemide (LASIX) 20 mg tablet Take  by mouth daily as needed. Yes Provider, Historical   aspirin delayed-release 81 mg tablet Take 81 mg by mouth daily. Yes Provider, Historical       No Known Allergies    Review of Systems  Gen: No fever, chills, + malaise, no  weight loss/gain. Heent: No headache, rhinorrhea, epistaxis, ear pain, hearing loss, sinus pain, neck pain/stiffness, sore throat. Heart: No chest pain, palpitations, LANGLEY, pnd, or orthopnea. Resp: +cough, no hemoptysis, wheezing and shortness of breath. GI: No nausea, vomiting, diarrhea, constipation, melena or hematochezia. : No urinary obstruction, dysuria or hematuria. Derm: No rash, new skin lesion or pruritis. Musc/skeletal: no bone or joint complains. Vasc: + edema, no cyanosis or claudication. Endo: No heat/cold intolerance, no polyuria,polydipsia or polyphagia. Neuro: No unilateral weakness, numbness, tingling. No seizures. Heme: No easy bruising or bleeding. Physical Exam:     Physical Exam:  Visit Vitals  /41   Pulse 87   Temp 98.2 °F (36.8 °C)   Resp 15   Ht 5' 5\" (1.651 m)   Wt 56.7 kg (125 lb)   SpO2 98%   BMI 20.80 kg/m²           Temp (24hrs), Av.2 °F (36.8 °C), Min:98.2 °F (36.8 °C), Max:98.2 °F (36.8 °C)    No intake/output data recorded. No intake/output data recorded. General:  Awake, cooperative, no distress. Pale    Head:  Normocephalic, without obvious abnormality, atraumatic.    Eyes: Conjunctivae/corneas clear, sclera anicteric, PERRL, EOMs intact. Nose: Nares normal. No drainage or sinus tenderness. Throat: Lips, mucosa, and tongue normal. .   Neck: Supple, symmetrical, trachea midline, no adenopathy. Lungs:   Clear to auscultation bilaterally. Heart:  Regular rate and rhythm, S1, S2 normal, + murmur, no click, rub or gallop. Abdomen: Soft, non-tender. Bowel sounds normal. ascites   +organomegaly. Extremities: Extremities normal, atraumatic, no cyanosis. +edema. Pulses: 2+ and symmetric all extremities. Skin: Skin color-pink, texture, turgor normal. No rashes or lesions. Capillary refill normal    Neurologic: CNII-XII intact. No focal motor or sensory deficit.        Labs Reviewed:    BMP:   Lab Results   Component Value Date/Time     09/10/2019 01:40 PM    K 4.6 09/10/2019 01:40 PM     (H) 09/10/2019 01:40 PM    CO2 24 09/10/2019 01:40 PM    AGAP 7 09/10/2019 01:40 PM     (H) 09/10/2019 01:40 PM    BUN 38 (H) 09/10/2019 01:40 PM    CREA 1.01 09/10/2019 01:40 PM    GFRAA >60 09/10/2019 01:40 PM    GFRNA 53 (L) 09/10/2019 01:40 PM     CMP:   Lab Results   Component Value Date/Time     09/10/2019 01:40 PM    K 4.6 09/10/2019 01:40 PM     (H) 09/10/2019 01:40 PM    CO2 24 09/10/2019 01:40 PM    AGAP 7 09/10/2019 01:40 PM     (H) 09/10/2019 01:40 PM    BUN 38 (H) 09/10/2019 01:40 PM    CREA 1.01 09/10/2019 01:40 PM    GFRAA >60 09/10/2019 01:40 PM    GFRNA 53 (L) 09/10/2019 01:40 PM    CA 7.8 (L) 09/10/2019 01:40 PM    MG 2.4 09/10/2019 01:40 PM    ALB 2.6 (L) 09/10/2019 01:40 PM    TP 6.4 09/10/2019 01:40 PM    GLOB 3.8 09/10/2019 01:40 PM    AGRAT 0.7 (L) 09/10/2019 01:40 PM    SGOT 51 (H) 09/10/2019 01:40 PM    ALT 43 09/10/2019 01:40 PM     CBC:   Lab Results   Component Value Date/Time    WBC 15.6 (H) 09/10/2019 01:40 PM    HGB 9.2 (L) 09/10/2019 01:40 PM    HCT 31.1 (L) 09/10/2019 01:40 PM     (L) 09/10/2019 01:40 PM All Cardiac Markers in the last 24 hours:   Lab Results   Component Value Date/Time    CPK 20 (L) 09/10/2019 01:40 PM    CKMB <1.0 09/10/2019 01:40 PM    CKND1  09/10/2019 01:40 PM     CALCULATION NOT PERFORMED WHEN RESULT IS BELOW LINEAR LIMIT    Stephen Miles <0.02 09/10/2019 01:40 PM     Recent Glucose Results:   Lab Results   Component Value Date/Time     (H) 09/10/2019 01:40 PM     ABG: No results found for: PH, PHI, PCO2, PCO2I, PO2, PO2I, HCO3, HCO3I, FIO2, FIO2I  COAGS:   Lab Results   Component Value Date/Time    PTP 17.6 (H) 09/10/2019 01:40 PM    INR 1.5 (H) 09/10/2019 01:40 PM     Liver Panel:   Lab Results   Component Value Date/Time    ALB 2.6 (L) 09/10/2019 01:40 PM    TP 6.4 09/10/2019 01:40 PM    GLOB 3.8 09/10/2019 01:40 PM    AGRAT 0.7 (L) 09/10/2019 01:40 PM    SGOT 51 (H) 09/10/2019 01:40 PM    ALT 43 09/10/2019 01:40 PM     (H) 09/10/2019 01:40 PM     Pancreatic Markers: No results found for: AMYLPOCT, AML, LIPPOCT, LPSE    Procedures/imaging: see electronic medical records for all procedures/Xrays and details which were not copied into this note but were reviewed prior to creation of Arnaldo Oconnor MD, Internal Medicine     CC:  Nasreen Ryan MD

## 2019-09-11 PROBLEM — R06.02 SOB (SHORTNESS OF BREATH): Status: ACTIVE | Noted: 2019-01-01

## 2019-09-11 NOTE — PROGRESS NOTES
Hospitalist Progress Note-critical care note     Patient: Adalid Valle MRN: [id number]  CSN: [id number]    YOB: 1940  Age: 70 y.o. Sex: female    DOA: 9/10/2019 LOS:  LOS: 1 day            Chief complaint: pna ,sob , myelofibrosis , polycythemia vera     Assessment/Plan         Hospital Problems  Date Reviewed: 8/30/2019          Codes Class Noted POA    SOB (shortness of breath) ICD-10-CM: R06.02  ICD-9-CM: 786.05  9/11/2019 Unknown        Anemia ICD-10-CM: D64.9  ICD-9-CM: 285.9  9/10/2019 Unknown        Cirrhosis ([facility] ICD-10-CM: K74.60  ICD-9-CM: 571.5  9/10/2019 Unknown        * (Principal) Pneumonia ICD-10-CM: J18.9  ICD-9-CM: 599  9/10/2019 Unknown        S/P TIPS (transjugular intrahepatic portosystemic shunt) ICD-10-CM: X57.117  ICD-9-CM: V45.89  8/29/2019 Yes        S/P AVR (aortic valve replacement) ICD-10-CM: Z95.2  ICD-9-CM: V43.3  7/24/2019 Yes        Myelofibrosis ([facility] ICD-10-CM: D75.81  ICD-9-CM: 289.83  3/27/2019 Yes        Splenomegaly ICD-10-CM: R16.1  ICD-9-CM: 789.2  10/10/2018 Yes        Ascites ICD-10-CM: R18.8  ICD-9-CM: 789.59  4/24/2018 Yes        Polycythemia vera ([facility] ICD-10-CM: D45  ICD-9-CM: 238.4  6/30/2016 Yes              Dyspnea   Not improving   will give lasix   Echo done results pending   Cta r/o PE     pna   Will continue vac/zosyn   Breathing tx as needed        Ascites /cirrhosis , s/p tips   Will have ultrasound guided paracentesis will help breathing   Will have abdomen ct for evaluation  F/u with dr. Sean Mitchell, will have him on board  Will give some albumin  Ammonia level daily -46 today      Anemia-stable at the baseline         Polycythemia  Vera /myofibrosis, splenomegaly   Continue hydrea and aspirin      S/p arv   Per hansel , so far stable     Subjective: sob, I have pna   Rn: no acute issue, family brought in food     Will have palliative care on board, for care goal, case discussed with Dr. Sean Mitchell.   I do not think she will have good Out-come. Have speech evaluate , dysphagia diet   Disposition :tbd,   Review of systems:    General: No fevers or chills. Cardiovascular: No chest pain or pressure. No palpitations. Pulmonary:  shortness of breath. Gastrointestinal: No nausea, vomiting. Vital signs/Intake and Output:  Visit Vitals  /72   Pulse 95   Temp 97.3 °F (36.3 °C)   Resp 16   Ht 5' 5\" (1.651 m)   Wt 72.6 kg (160 lb)   SpO2 92%   BMI 26.63 kg/m²     Current Shift:  09/11 0701 - 09/11 1900  In: 100 [I.V.:100]  Out: 200 [Urine:200]  Last three shifts:  09/09 1901 - 09/11 0700  In: 550 [I.V.:550]  Out: 350 [Urine:350]    Physical Exam:  General: WD, WN. Alert, cooperative, no acute distress    HEENT: NC, Atraumatic. PERRLA, anicteric sclerae. Lungs: CTA Bilaterally. No Wheezing/Rhonchi/Rales. Heart:  Regular  rhythm,  No murmur, No Rubs, No Gallops  Abdomen: Soft, distended, Non tender.  +Bowel sounds, enlarge spleen   Extremities: No c/c/e  Psych:    anxious, no  agitated. Neurologic:  No acute neurological deficit.              Labs: Results:       Chemistry Recent Labs     09/11/19  0400 09/10/19  1340   GLU 86 130*    144   K 4.5 4.6   * 113*   CO2 21 24   BUN 36* 38*   CREA 1.02 1.01   CA 7.5* 7.8*   AGAP 9 7   BUCR 35* 38*   * 306*   TP 6.4 6.4   ALB 2.9* 2.6*   GLOB 3.5 3.8   AGRAT 0.8 0.7*      CBC w/Diff Recent Labs     09/11/19  0400 09/10/19  1340   WBC 14.6* 15.6*   RBC 3.83* 4.07*   HGB 8.5* 9.2*   HCT 29.1* 31.1*   * 122*   GRANS 53 71   LYMPH 10* 7*   EOS 4 1      Cardiac Enzymes Recent Labs     09/10/19  1340   CPK 20*   CKND1 CALCULATION NOT PERFORMED WHEN RESULT IS BELOW LINEAR LIMIT      Coagulation Recent Labs     09/11/19  0400 09/10/19  1340   PTP 18.8* 17.6*   INR 1.6* 1.5*       Lipid Panel Lab Results   Component Value Date/Time    Cholesterol, total 116 11/29/2018 07:40 AM    HDL Cholesterol 35 (L) 11/29/2018 07:40 AM    LDL, calculated 67.2 11/29/2018 07:40 AM    VLDL, calculated 13.8 11/29/2018 07:40 AM    Triglyceride 69 11/29/2018 07:40 AM    CHOL/HDL Ratio 3.3 11/29/2018 07:40 AM      BNP No results for input(s): BNPP in the last 72 hours.    Liver Enzymes Recent Labs     09/11/19  0400   TP 6.4   ALB 2.9*   *   SGOT 46*      Thyroid Studies No results found for: T4, T3U, TSH, TSHEXT     Procedures/imaging: see electronic medical records for all procedures/Xrays and details which were not copied into this note but were reviewed prior to creation of Arnaldo Oconnor MD

## 2019-09-11 NOTE — CONSULTS
[address] [facility], Kristyn ESCAMILLA Cite Mongi Slim, Remigio Saliva, MD Norberto Martinet, PA-C Natha Carrow, [facility]     Ermelinda Francisco, [facility]   JOSTIN Adam, [facility]       Nannette Benavides Giancarlo De Garcia [address]    at [address], [address] [facility], Sid  22.    [phone]    FAX: [address] [place] [address] Avenue    [address], [address], [address]    [phone]    FAX: [phone]       HEPATOLOGY CONSULT NOTE    The patient is well known to and regularly followed at [address] [address]. She is a 70 y.o.  female with PVera and myelofibrosis taking hydroxyurea. She developed massive splenomegaly and hepatomegaly due to extramedulary hematopoesis. She subsequently developed refractory ascites. A liver biopsy demonstrated that she does not have cirrhosis. Ascites was due to sinusoidal obstruction from blood cell precusors clocking hepatic sinusoids and she underwent TIPS in 8/2019. She also has a history of Aortic stenosis and a few weeks prior to undergoing TIPS she had percutaneous AVR. Prior to this she had AS, MR, mild pulmonary HTN, but a normal RV and mild TR. The most recent ECHO yesterday now shows leaking around the prosthetic valve, Severe LA dilation, Moderate to severe pulmonary HTN,  mild dilation of the RV and mild TR. She came to the ED yesterday because of cough, SOB and progressive weakness. A CXR demonstrated signs of pulmonary HTN, possible pneumonia. She is being treated with ABX,    I have reviewed the Emergency room note, Hospital admission note, Notes by all other physicians who have seen the patient during this hospitalization to date. I have reviewed the problem list and the reason for this hospitalization. I have reviewed the allergies and the medications the patient was taking at home prior to this hospitalization. This evening she looks severely ill. She is SOB, extremely week, has heart murmurs, bilateral lower edema and some ascites.      ASSESSMENT AND PLAN:  Ascites   The etiology for ascites is extramedulary hematopoesis with extensive hematologic precursor cells throughout the hepatic sinusoids causing sinusoidal obstructive syndrome. The patient does not have cirrhosis     The patient underwent a transjugular liver biopsy with hepatic venous pressure measurements in 3/2019. Liver biopsy demonstrated normal liver, no fibrosis, and extensive extramedulary hematopoesis throughout sinusoids. Hepatic vein pressures were RA 1, IVC 2, free hepatic vein 3, wedge hepatic vein 14. HVPG = 11. Ascites albumin in 3/2019 was 1.7 with SAAG = 1.4 consistent with portal HTN as the etiology for ascites.     ECHO demonstrated mild pulmonary HTN but normal RV and only mild TR. Since the HVPG is elevated she underwent TIPS to reduce portal pressure and improve ascites. This was successfully placed. Ascites initially resolved but then started to recur   She is scheduled for US and paracentesis in AM.  The TIPS is likely no longer effective because of severe pulmonary HTN    Acute kidney injury  The patient has developed acute kidney injury  MYRTLE is secondary to dehydration from inadequate fluid intake and poor forward cardiac output. .    She is being treated with IV albumin.     Aortic stenosis  She had tight aortic stenosis and mild pulmonary HTN  She underwent TAVR of the Aortic value. ECHO today demonstrated leaking around the aortic valve, MR, dilated LA, severe pulmonary HTN with mild RV dilation and mild TR.       I believe the reason for her rapid decline in multi-valvular heart disease and severe pulmonary HTN    End-of-life care  The patient has advanced valvular heart disease and severe pulmonary HTN  I do not see a solution to this  She has become extremely frail within just a few weeks  Discussed advanced directive. The patient has a Power of  and a DNR request.  Discussed hospice care. The patient is agreeable to hospice. I would ask Hospice to see her in AM and make arrangements to go home. SYSTEM REVIEW:  Constitution systems: Negative for fever, chills, weight gain, weight loss. Eyes: Negative for visual changes. ENT: Negative for sore throat, painful swallowing. Respiratory: SOB. Cardiology: Edema  GI:  Negative for constipation or diarrhea. : Negative for urinary frequency, dysuria, hematuria, nocturia. Skin: Negative for rash. Hematology: Negative for easy bruising, blood clots. Musculo-skelatal: Negative for back pain, muscle pain, weakness. Neurologic: Negative for headaches, dizziness, vertigo, memory problems not related to HE. Psychology: Negative for anxiety, depression. FAMILY HISTORY:  The father  of PVera, CVA. The mother  of stomach cancer. There is no family history of liver disease.       SOCIAL HISTORY:  The patient is . The patient has 1 child. The patient has never used tobacco products. The patient has never consumed significant amounts of alcohol. PHYSICAL EXAMINATION:  VS: per nursing note  General:  Ill appearing  Eyes:  Sclera anicteric. ENT:  No oral lesions. Thyroid normal.  Nodes:  No adenopathy. Skin:  No spider angiomata. No jaundice. Respiratory:  Reduced breath sounds bilaterally  Cardiovascular:  Regular heart rate. Murmurs present. Abdomen:  Soft non-tender, Ascites appears to be present. Extremities:  4+ lower extremity edema. Neurologic:  Alert and oriented. Lethargic. Cranial nerves grossly intact. No asterixis. LABORATORY:  Results for Ashanti Morrissey (MRN [id number]) as of 2019 19:38   Ref.  Range 2019 02:50 9/10/2019 13:40 2019 04:00   WBC Latest Ref Range: 4.6 - 13.2 K/uL 56.4 (HH) 15.6 (H) 14.6 (H)   HGB Latest Ref Range: 12.0 - 16.0 g/dL 9.0 (L) 9.2 (L) 8.5 (L)   PLATELET Latest Ref Range: 135 - 420 K/uL 83 (L) 122 (L) 122 (L)   INR Latest Ref Range: 0.8 - 1.2    1.5 (H) 1.6 (H)   Sodium Latest Ref Range: 136 - 145 mmol/L 141 144 143   Potassium Latest Ref Range: 3.5 - 5.5 mmol/L 4.3 4.6 4.5   Chloride Latest Ref Range: 100 - 111 mmol/L 111 113 (H) 113 (H)   CO2 Latest Ref Range: 21 - 32 mmol/L 20 (L) 24 21   Glucose Latest Ref Range: 74 - 99 mg/dL 60 (L) 130 (H) 86   BUN Latest Ref Range: 7.0 - 18 MG/DL 36 (H) 38 (H) 36 (H)   Creatinine Latest Ref Range: 0.6 - 1.3 MG/DL 1.32 (H) 1.01 1.02   Bilirubin, total Latest Ref Range: 0.2 - 1.0 MG/DL 0.8 1.8 (H) 2.7 (H)   Albumin Latest Ref Range: 3.4 - 5.0 g/dL 2.7 (L) 2.6 (L) 2.9 (L)   ALT (SGPT) Latest Ref Range: 13 - 56 U/L 247 (H) 43 35   AST Latest Ref Range: 10 - 38 U/L 439 (H) 51 (H) 46 (H)   Alk.  phosphatase Latest Ref Range: 45 - 117 U/L 342 (H) 306 (H) 262 (H)   Ammonia Latest Ref Range: 11 - 32 UMOL/L 67 (H) 67 (H) 46 (H)     Fransisco Oneill MD  [address] [address]  [address], Kenisha Sawyer [address], [address]  12 [facility]

## 2019-09-11 NOTE — PROGRESS NOTES
Orders received. Chart reviewed. PT assessment not performed as pt is scheduled for a CT scan to r/o PE. Will await results prior to assessment.

## 2019-09-11 NOTE — PROGRESS NOTES
8236 Received bedside report from night shift RN. Patient resting quietly in bed. Alert and oriented x 4. No complaint of pain/nausea. Accessory muscle use when breathing. 0930 Dr Ann Hampton in to assess patient, put in orders for speech eval. Patient had difficulty swallowing a PO med and with her breakfast.     1020 speech therapy in to assess patient. Patient drowsy but arousable. 1300 palliative care in to speak with patient. 1730 patient taken down for CT scan. Patient resting quietly in bed throughout shift, up to bedside commode, dyspnea with exertion. 1900 Dr Hansa Cristina in to assess patient. Patient Vitals for the past 12 hrs:   Temp Pulse Resp BP SpO2   09/11/19 1840 97.7 °F (36.5 °C) 92 17 140/62 94 %   09/11/19 1409 97.8 °F (36.6 °C) 90 16 139/61 92 %   09/11/19 1144 97.7 °F (36.5 °C) 96 16 144/65 94 %   09/11/19 1031 -- -- -- 146/72 --   09/11/19 1016 -- 95 -- 146/72 --     Bedside and Verbal shift change report given to Jovani Randle RN (oncoming nurse) by Jeet Craig RN (offgoing nurse). Report included the following information SBAR, Kardex, Intake/Output, MAR and Recent Results.

## 2019-09-11 NOTE — PROGRESS NOTES
Problem: Dysphagia (Adult)  Goal: *Acute Goals and Plan of Care (Insert Text)  Description  Recommendations:  Diet: Mechanical soft, thin liquid   Meds: Crushed in puree  Aspiration Precautions  Oral Care TID  Feeding assistance    Goals:  Patient will:  1. Tolerate PO trials with 0 s/s overt distress in 4/5 trials  2. Utilize compensatory swallow strategies/maneuvers (decrease bite/sip, size/rate, alt. liq/sol) with min cues in 4/5 trials  3. Perform oral-motor/laryngeal exercises to increase oropharyngeal swallow function with min cues  4. Complete an objective swallow study (i.e., MBSS) to assess swallow integrity, r/o aspiration, and determine of safest LRD, min A     Outcome: Progressing Towards Goal      SPEECH LANGUAGE PATHOLOGY BEDSIDE SWALLOW   EVALUATION & TREATMENT     Patient: Hilda House (14 y.o. female)  Date: 9/11/2019  Primary Diagnosis: Anemia [D64.9]        Precautions: Aspiration  PLOF: Independent    ASSESSMENT :  Based on the objective data described below, the patient presents with mild oropharyngeal dysphagia in the setting of anemia. Per RN, patient coughing while taking pills with thin liquid. A&Ox3. Noted to be very dyspneic and short of breath. Oral-motor exam revealed pt with lingual and labial weakness; however, all other structures grossly intact for mastication and deglutition. Pt endorses regular diet at baseline with no PMHx of dysphagia. Observed with thin liquid + straw, puree and solid trials. Demo delayed mastication of solids with mild lingual residue, cleared with sip thin liquid wash. Audible swallow across all trials, 0 overt s/sx of aspiration. Recommend mechanical soft, thin liquid diet with aspiration precautions. Pills should be presented crushed. Patient may require feeding assistance. May benefit from MBSS if patient continues to demo s/sx of aspiration.  D/w RN, Yu Rivers and Dr. Enmanuel Bedoya.     TREATMENT :  Skilled therapy initiated; Educated pt and staff on aspiration precautions and importance of compensatory swallow techniques to decrease aspiration risk (decrease rate of intake & sip/bite size, upright @HOB for all po intake and ~30 minutes after po); verbalized comprehension. SLP to follow as indicated to assess diet tolerance and education. Patient will benefit from skilled intervention to address the above impairments. Patient's rehabilitation potential is considered to be Fair  Factors which may influence rehabilitation potential include:   ? None noted  ? Mental ability/status  ? Medical condition  ? Home/family situation and support systems  ? Safety awareness  ? Pain tolerance/management  ? Other:      PLAN :  Recommendations and Planned Interventions:  Mech-soft/thin  Frequency/Duration: Patient will be followed by speech-language pathology 1-2 times per day/4-7 days per week to address goals. Discharge Recommendations: To Be Determined     SUBJECTIVE:   Patient stated Regular food. OBJECTIVE:     Past Medical History:   Diagnosis Date    Aortic stenosis     Arrhythmia     Breast CA ([address]     CAD (coronary artery disease)     cabg 20 yrs ago  2019 leaking heart valve to be repaired 6/26/19    Cancer ([address] 2004    breast, rt    Cellulitis of right leg     Ill-defined condition     polocythemia vera    Liver disease     swollen liver-swollen spleen-hernia 2017    Polycythemia vera ([address] 2016    Thromboembolus ([address] 06/2016    DVT left leg     Past Surgical History:   Procedure Laterality Date    BREAST SURGERY PROCEDURE UNLISTED  2005    part.  rt.mastectomy    CARDIAC SURG PROCEDURE UNLIST      2x bypass 20 years ago    CHEST SURGERY PROCEDURE UNLISTED      COLONOSCOPY N/A 8/11/2019    COLONOSCOPY performed by Hector Rios MD at [facility]COPY    HX AORTIC VALVE REPLACEMENT  06/2019    HX CORONARY ARTERY BYPASS GRAFT  1996    HX HEENT      cataracts    IR BX TRANSCATHETER 3/22/2019    IR INSERT TIPS HEPATIC SHUNT  8/29/2019     Home Situation:   Home Situation  Home Environment: Private residence  # Steps to Enter: 4  One/Two Story Residence: One story  Living Alone: No  Support Systems: Child(masood), Family member(s)  Patient Expects to be Discharged to[de-identified] Private residence  Current DME Used/Available at Home: Cane, straight    Diet prior to admission: Regular/thin  Current Diet: Mech-soft/thin    Cognitive and Communication Status:  Neurologic State: Alert  Orientation Level: Oriented to person, Oriented to place, Oriented to situation  Cognition: Follows commands  Perception: Appears intact  Perseveration: No perseveration noted  Safety/Judgement: Awareness of environment  Oral Assessment:  Oral Assessment  Labial: Decreased rate  Dentition: Natural  Oral Hygiene: Fair  Lingual: Decreased strength;Decreased rate  Velum: Unable to visualize  Mandible: No impairment  P.O. Trials:     Vocal quality prior to P.O.: Low volume  Consistency Presented: Thin liquid;Puree; Solid  How Presented: Self-fed/presented;SLP-fed/presented;Straw;Successive swallows;Spoon     Bolus Acceptance: No impairment  Bolus Formation/Control: Impaired  Type of Impairment: Delayed;Mastication  Propulsion: Delayed (# of seconds)  Oral Residue: Less than 10% of bolus; Lingual  Initiation of Swallow: No impairment  Laryngeal Elevation: Functional  Aspiration Signs/Symptoms: None  Pharyngeal Phase Characteristics: Audible swallow  Effective Modifications: Small sips and bites  Cues for Modifications: Minimal       Oral Phase Severity: Mild  Pharyngeal Phase Severity : Mild    PAIN:  Pain level pre-treatment: 0/10   Pain level post-treatment: 0/10     After treatment:   ?            Patient left in no apparent distress sitting up in chair  ? Patient left in no apparent distress in bed  ? Call bell left within reach  ? Nursing notified  ? Family present  ?             Caregiver present  ? Bed alarm activated    COMMUNICATION/EDUCATION:   ?            Aspiration precautions; swallow safety; compensatory techniques. ?            Patient/family have participated as able in goal setting and plan of care. ?            Patient/family agree to work toward stated goals and plan of care. ?            Patient understands intent and goals of therapy; neutral about participation. ? Patient unable to participate in goal setting/plan of care; educ ongoing with interdisciplinary staff  ? Posted safety precautions in patient's room.     Thank you for this referral.  DEBBIE Weinstein  Time Calculation: 16 mins  Evaluation Time: 8 minutes   Treatment Time: 8 minutes

## 2019-09-11 NOTE — PROGRESS NOTES
CM spoke with provider to discuss transition of care. Provider has indicated plan for a Palliative Care consult. CM to await outcome of consult and confirmation of goals of care to move forward with transition of care. CM to continue to follow and assist.    Care Management Interventions  PCP Verified by CM:  Yes  Transition of Care Consult (CM Consult): Discharge Planning  Health Maintenance Reviewed: Yes  Physical Therapy Consult: Yes  Occupational Therapy Consult: Yes  Speech Therapy Consult: Yes  Current Support Network: Relative's Home  Confirm Follow Up Transport: Family  Discharge Location  Discharge Placement: Home with home health(vs SNF)

## 2019-09-11 NOTE — ACP (ADVANCE CARE PLANNING)
NO advanced directive is on file. Patient stated she has an AMD naming her son Penny Smith as primary healthcare agent. She will have son provide hospital copy. Did call PCP office and they don't have copy of patients AMD. We confirmed her wish is for DNR/DNI. Durable DNR is on file signed by patient.     Code status: DNR/DNI    MPOA/NOK: Penny Smith (son)  [phone]

## 2019-09-11 NOTE — PROGRESS NOTES
Palliative Medicine  [place]: [phone]  [facility]: [phone])    Palliative consult received and appreciated. Consult to assist with goals of care. Palliative team (CAMERON Goldberg and this MSW) met with patient at bedside. No family was visiting. Symptoms: Patient was awake on arrival and open to visit. She was AAOx4 laying down in bed. Ms. Warden Faulkner appeared anxious and continued to be anxious throughout visit. She didn't appear to be struggling with breathing and denied having difficulty with breathing. She stated not wanting another paracentesis now, would prefer to wait. Ms. Warden Faulkner denied having any current discomfort. Illness Understanding: Mrs. Warden Faulkner has questions related to her cirrhosis and further treatment options. She is awaiting to talk with Dr. Moiz Dial regarding treatment plan and care post TIPS. She mentioned feeling worried regarding care plan, since the TIPS \"didn't work\" and she was feeling worse. She was fully independent at home; walking without DME, driving self and tending to ADLs. Hopes: Mrs. Meaghan Rios hope is to get back to living and functioning independently. She would like to look into rehab after hospitalization. Worries/Fears: She mentioned overall feeling of being scared with having a huge decline in her health and function. Mrs. Warden Faulkner worries she may not get back home. Bereavement: Mrs. Warden Faulkner has lost her  but didn't elaborate or share emotions around his loss. She's grieving the recent decline in her physical health. Provided her with empathic listening throughout conversation. Noted scanned in PCP office note from 9/10 states patient has diagnosis of anxiety and depression and is prescribed Xanax 0.5mg 2 times a day as needed. Xanax medication isn't listed on patient MAR or PTA list. Informed bedside CAMERON Franco so she could verify medication with patient.          Advanced Care Planning: NO advanced directive is on file. Patient stated she has an AMD naming her son Adele Oshea as primary healthcare agent. She will have son provide hospital copy. Did call PCP office and they don't have copy of patients AMD. We confirmed her wish is for DNR/DNI. Durable DNR is on file signed by patient. Patient is  and has two sons; Madalyn Carrizales lives local and Xavi Wooten lives in [place]. Son Madalyn Carrizales lives with patient. Patient's plan is to discuss care options with Dr. Stephon Sage, then is hopeful for rehab to regain strength and mobility. Palliative will continue to follow along to assist with care goals. Await hepatologist consult. Time in: 1245  Time out: 1324    Thank you for the opportunity to assist in the care of Mrs. Eneida Gunn.   NEERAJ Reynolds  Palliative Medicine

## 2019-09-11 NOTE — PROGRESS NOTES
OT note: Hold OT eval for now as pt awaiting CT scan to rule out PE. Will wait for testing results and pt medically appropriate for OT evaluation.  Thank you Sobia Lay OTR/L

## 2019-09-11 NOTE — PROGRESS NOTES
Bedside shift change report given to DUNIA James RN. Report included the following information SBAR, Kardex, Intake/Output, MAR and Recent Results. Pt sresting in bed quietly. Repositioned for comfort. No other concerns to report.

## 2019-09-11 NOTE — PROGRESS NOTES
Assisted to bedside commode, voided and had small soft BM. Noted SOB on exertion, assisted back to bed, elevated legs on pillow and pure wick reapplied. Pt stated she is not getting Paracentesis in am    0100 Pt with difficulty getting out of bed, assisted to [facility] to void. Had urinary incontinence earlier, gown and bed pad changed. Placed on 3 li O2 when pulse Ox read 87%, pt stated she felt better, lung sounds clear    0630 Sitting up on side of bed, denied pain, still SOB on exertion.

## 2019-09-11 NOTE — PROGRESS NOTES
SHIFT SUMMARY  Received patient alert and  Oriented,on room air,peripheral I.v,on antibiotics,denies having any pain all night however very dyspneic and short of breath with minimal activities. Up to the chair multiple times overnight,voiding +BM,no acute changes in status. Patient Vitals for the past 12 hrs:   Temp Pulse Resp BP SpO2   09/11/19 0651 97.3 °F (36.3 °C) 91 16 148/68 92 %   09/11/19 0339 98.3 °F (36.8 °C) 95 16 134/55 93 %   09/10/19 2238 99.5 °F (37.5 °C) 95 16 131/53 95 %     Bedside and Verbal shift change report given to -111 [facility] (oncoming nurse) by Neva Mclean RN   (offgoing nurse). Report included the following information SBAR, Kardex and MAR.

## 2019-09-12 PROBLEM — I27.20 MODERATE TO SEVERE PULMONARY HYPERTENSION (HCC): Status: ACTIVE | Noted: 2019-01-01

## 2019-09-12 NOTE — PROGRESS NOTES
Orders received. Chart reviewed. PT assessment not performed at this time as pt refused services. Pt reports she is getting a paracentesis today and is getting d/c home w/ hospice tomorrow. Pt requested PT services to be cancelled. Nurse and  made aware. Will d/c orders at this time.

## 2019-09-12 NOTE — ROUTINE PROCESS
Bedside and Verbal shift change report given to BETSEY Rose RN (oncoming nurse) by Keshav Patel RN   (offgoing nurse). Report included the following information SBAR, Kardex, Intake/Output, MAR and Recent Results.

## 2019-09-12 NOTE — PROCEDURES
9/12/2019    Vascular & Interventional Radiology Brief Procedure Note     : Betito Monaco PA-C    Supervising Radiologist:  Charles Rodas MD     Pre-operative Diagnosis:  Recurrent ascites     Post-operative Diagnosis: Same as pre-op dx     Procedure(s) Performed: US Guided Paracentesis     Anesthesia:  Local Anesthesia     Findings:  5 [place], RLQ approach chosen, with moderate volume ascites. Exact amount in dictated report.      Complications: None     Estimated Blood Loss:  Minimal     Tubes and Drains: None     Specimens: Sent to the lab as requested     Condition: Good     Disposition:  Return to nursing unit        Betito Monaco PA-C  [facility]  9/12/2019

## 2019-09-12 NOTE — HOSPICE
Pt/family pursuing hospice:yes   Admission dx: Chronic Respiratory failure secondary to severe Pulmonary HTN, recurrent ascites. Co-morbid conditions- Polycythemia Vera  Anticipated hospital d/c date:9/13/2019  Discussion occurred with patient and/or family about preference of hospitalization once admitted to hospice: yes   Reviewed and discussed hospice philosophy and keeping the patient comfortable in their residence: yes     Narrative of events and/or assessment if applicable: Met with patient at bedside. Discussed [facility] philosophy, services, criteria, and IDT. Answered all questions. Gave brochure with 24/7 contact information. Patient requested [facility] services. Per patient, her son Evelyn Tucker and the neighbor will provide 24/7care. Confirmed caregiver situation with [facility]elyn Seeds. Evelyn Seeds stated DME could be delivered today. DME ordered with delivery scheduled for today. Plans to d/c tomorrow. CM updated on above information. Thank you for the referral to [facility]. If we can be of further assistance please contact [phone].         KENTON AparicioN, RN  Nurse Liaison, [place] [address] [address] [address]  [phone]  [email or web address]

## 2019-09-12 NOTE — PROGRESS NOTES
Problem: Dysphagia (Adult)  Goal: *Acute Goals and Plan of Care (Insert Text)  Description  Recommendations:  Diet: Dental soft, thin liquid   Meds: Crushed in puree  Aspiration Precautions  Oral Care TID    Goals:  Patient will:  1. Tolerate PO trials with 0 s/s overt distress in 4/5 trials  2. Utilize compensatory swallow strategies/maneuvers (decrease bite/sip, size/rate, alt. liq/sol) with min cues in 4/5 trials  3. Perform oral-motor/laryngeal exercises to increase oropharyngeal swallow function with min cues  4. Complete an objective swallow study (i.e., MBSS) to assess swallow integrity, r/o aspiration, and determine of safest LRD, min A      Outcome: Progressing Towards Goal    SPEECH LANGUAGE PATHOLOGY DYSPHAGIA TREATMENT    Patient: Kyle Pandya (64 y.o. female)  Date: 9/12/2019  Diagnosis: Anemia [D64.9] Pneumonia       Precautions: Aspiration  PLOF: Independent     ASSESSMENT:  Followed up with dysphagia intervention. A&Ox4. Seated EOB. Patient observed self-feeding thin liquid + straw, pudding and solid trials with 0 overt s/sx of aspiration. Pt reports fatigue with meal, encouraged to take frequent rest breaks. Recommend upgrade to soft solid, thin liquid diet with aspiration precautions, pills crushed in puree. D/w RN, Paco Gibson. Progression toward goals:  ?         Improving appropriately and progressing toward goals  ? Improving slowly and progressing toward goals  ? Not making progress toward goals and plan of care will be adjusted     PLAN:  Recommendations and Planned Interventions:  Soft/thin  Patient continues to benefit from skilled intervention to address the above impairments. Continue treatment per established plan of care. Discharge Recommendations: To Be Determined     SUBJECTIVE:   Patient stated I have a bad heart.     OBJECTIVE:   Cognitive and Communication Status:  Neurologic State: Alert  Orientation Level: Oriented X4  Cognition: Follows commands, Appropriate decision making, Appropriate for age attention/concentration, Appropriate safety awareness  Perception: Appears intact  Perseveration: No perseveration noted  Safety/Judgement: Awareness of environment  Dysphagia Treatment:  Oral Assessment:  Oral Assessment  Labial: No impairment  Dentition: Intact  Oral Hygiene: Fair  Lingual: No impairment  Velum: No impairment  Mandible: No impairment  P.O. Trials:   Patient Position: HOB 60   Vocal quality prior to P.O.: No impairment   Consistency Presented: Thin liquid, Puree, Solid   How Presented: Self-fed/presented, Straw, Successive swallows, Spoon       Bolus Acceptance: No impairment   Bolus Formation/Control: Impaired   Type of Impairment: Delayed, Mastication   Propulsion: No impairment   Oral Residue: None   Initiation of Swallow: No impairment   Laryngeal Elevation: Functional   Aspiration Signs/Symptoms: None   Pharyngeal Phase Characteristics: Audible swallow, Easily fatigued    Effective Modifications: Alternate liquids/solids, Small sips and bites   Cues for Modifications: Minimal         Oral Phase Severity: Mild   Pharyngeal Phase Severity : Mild     PAIN:  Pain level pre-treatment: 0/10   Pain level post-treatment: 0/10     After treatment:   ?              Patient left in no apparent distress sitting up in chair  ? Patient left in no apparent distress in bed  ? Call bell left within reach  ? Nursing notified  ? Family present  ? Caregiver present  ? Bed alarm activated      COMMUNICATION/EDUCATION:   ? Aspiration precautions; swallow safety; compensatory techniques  ? Patient unable to participate in education; education ongoing with staff  ? Posted safety precautions in patient's room.   ? Oral-motor/laryngeal strengthening exercises      DEBBIE Vinson  Time Calculation: 12 mins

## 2019-09-12 NOTE — PROGRESS NOTES
Patient wishes to see Father Oren Silva from [facility] who is her Ameya Mckenna is yet to call the [employer].      Sister Tharon Frankel, [place], [address] [phone]

## 2019-09-12 NOTE — PROGRESS NOTES
0902 Pt refuses Lasix at this time. She states that she no longer takes it. 9231 Per Dr. Sheila Caro, pt is agreeable to paracentesis today. Called down to US, no answer. Left message. 0924 Pt accepted Lasix at this time. Explained to her that it had been started to assist in relief of her swelling and breathing symptoms. Debbie Caro and Dr. Georgia Noland that pt is SoB at this time, crackles are present. Also informed them that Lasix was given this AM. Verbal order received for 20mg IV Lasix now. They will review Lasix regimen. 2003 Noted that 40mg IV Lasix is scheduled to give now. Bedside and Verbal shift change report given to GONZALEZ Gloria RN (oncoming nurse) by Sherlyn Sicard, RN  (offgoing nurse). Report included the following information SBAR, Kardex, Intake/Output and MAR.

## 2019-09-12 NOTE — PROGRESS NOTES
Palliative Medicine  [place]: [phone] ([phone])  [facility]: [phone])    Palliative team f/u with patient at bedside. Mrs. Betina Akhtar was sitting up on side of bed this morning. She was wearing nasal cannula and still feeling SOB. She has pending paracentesis to assist with breathing symptoms. Mrs. Betina Akhtar shared last night she spoke with Dr. Varghese Barksdale, and he recommended hospice care at this time. She's in agreement with plan and has informed son Candance Shih. She didn't have many questions related to hospice as, \"I know what to expect, my  had hospice\". Mrs. Betina Akhtar would like a hospital bed, walker, oxygen, bedside commode, shower chair and bedside table for home. Her son will be her primary caregiver. She stated wanting to use [address]. Plan is for paracentesis today then d/c most likely tomorrow home with hospice services. Patient already has Durable DNR on file for d/c home. Informed candido Parker of above plan. No further palliative interventions needed at this time. Time in: 941  Time out: 1000    Thank you for the opportunity to assist in the care of Mrs. Betina Akhtar.    NEERAJ Ball  Palliative Medicine

## 2019-09-12 NOTE — ROUTINE PROCESS
Ultrasound guided paracentesis in the right lower quadrant. 3200 ML of fluid drained. Fluid taken to lab for testing. Patient taken by transport back to patient's room in stable condition.

## 2019-09-12 NOTE — HOSPICE
Chart review in process. Thank you for the referral to [facility]. Please contact [phone] with any questions or concerns.

## 2019-09-12 NOTE — PROGRESS NOTES
D/C Plan: [address] 9/13/19    Noted plan to transition home tomorrow with [facility]. CM contacted pt's son, Tatyana House ([phone]), and he has confirmed plan. Anticipate pt will be discharged tomorrow with medical transport between  11:00/12:30 to pt's home ([address]). Pt's family will be at the residence to receive the pt. Please note, there are 5 steps to enter into the home. Pt will have DME delivered later today in preparation for transition home with [facility]. No other transition of care needs have been identified at this time. CM remains available. Care Management Interventions  PCP Verified by CM:  Yes  Mode of Transport at Discharge: BLS  Transition of Care Consult (CM Consult): Discharge PlanningMarisol: Yes  Health Maintenance Reviewed: Yes  Physical Therapy Consult: Yes  Occupational Therapy Consult: Yes  Speech Therapy Consult: Yes  Current Support Network: Relative's Home  Confirm Follow Up Transport: Family  Plan discussed with Pt/Family/Caregiver: Yes  Freedom of Choice Offered: Yes  Discharge Location  Discharge Placement: [facility] with [facility] [address] [facility]

## 2019-09-12 NOTE — PROGRESS NOTES
OT note: Orders acknowledged and chart reviewed. Pt to discharge to home with hospice tomorrow and does not want any therapy services at this time. Will sign off.  Thank you Sunni Rodríguez OTR/L

## 2019-09-12 NOTE — PROGRESS NOTES
Hospitalist Progress Note-critical care note     Patient: Laci Wei MRN: [id number]  [facility]: [id number]    YOB: 1940  Age: 70 y.o.   Sex: female    DOA: 9/10/2019 LOS:  LOS: 2 days            Chief complaint: pna ,sob , myelofibrosis , polycythemia vera     Assessment/Plan         Hospital Problems  Date Reviewed: 8/30/2019          Codes Class Noted POA    Moderate to severe pulmonary hypertension ([facility] ICD-10-CM: I27.20  ICD-9-CM: 416.8  9/12/2019 Unknown        SOB (shortness of breath) ICD-10-CM: R06.02  ICD-9-CM: 786.05  9/11/2019 Unknown        Anemia ICD-10-CM: D64.9  ICD-9-CM: 285.9  9/10/2019 Unknown        * (Principal) Pneumonia ICD-10-CM: J18.9  ICD-9-CM: 486  9/10/2019 Unknown        S/P TIPS (transjugular intrahepatic portosystemic shunt) ICD-10-CM: S53.021  ICD-9-CM: V45.89  8/29/2019 Yes        S/P AVR (aortic valve replacement) ICD-10-CM: Z95.2  ICD-9-CM: V43.3  7/24/2019 Yes        Myelofibrosis ([facility] ICD-10-CM: D75.81  ICD-9-CM: 289.83  3/27/2019 Yes        Splenomegaly ICD-10-CM: R16.1  ICD-9-CM: 789.2  10/10/2018 Yes        Ascites ICD-10-CM: R18.8  ICD-9-CM: 789.59  4/24/2018 Yes        Polycythemia vera ([facility] ICD-10-CM: D45  ICD-9-CM: 238.4  6/30/2016 Yes              Dyspnea   Not improving-refused lasix, explained to her, she is ok to accept lasix    will give lasix   Echo done; moderate to severe pulm HTN   Cta r/o PE     pna   Will continue vac/zosyn   Breathing tx as needed        Ascites /s/p tips   Imagine showed cirrhosis, but hepatologist does not think so  Recurrent ascites -tips patent -but not help ascites any more   Will have ultrasound guided paracentesis will help breathing-she agrees to have it today   Will have abdomen ct for evaluation  F/u with dr. Susannah Crane board   Continue  albumin  Ammonia level daily     Anemia-stable at the baseline         Polycythemia  Vera /myofibrosis, splenomegaly   Continue hydrea and aspirin      S/p arv   Per hansel , so far stable     Subjective: still labor,   I would not want suffering, would like to go home with hospice  Rn: no acute issue     Son was at the bedside    Long discussion with pt about goal of care, she would like to go ahead for home hospice. She agrees to have lasix and paracentesis     Will have palliative care on board, for care goal, case discussed with Dr. Ry Milner. I do not think she will have good  Out-come. Have speech evaluate , dysphagia diet   Disposition :tbd,   Review of systems:    General: No fevers or chills. Cardiovascular: No chest pain or pressure. No palpitations. Pulmonary:  shortness of breath. Gastrointestinal: No nausea, vomiting. Vital signs/Intake and Output:  Visit Vitals  /60 (BP 1 Location: Right arm, BP Patient Position: At rest)   Pulse 78   Temp 97.4 °F (36.3 °C)   Resp 18   Ht 5' 5\" (1.651 m)   Wt 72.6 kg (160 lb)   SpO2 94%   BMI 26.63 kg/m²     Current Shift:  No intake/output data recorded. Last three shifts:  09/10 1901 - 09/12 0700  In: 1600 [I.V.:1600]  Out: 1400 [Urine:1400]    Physical Exam:  General: WD, WN. Alert, cooperative, in distress -labor breathing   HEENT: NC, Atraumatic. PERRLA, anicteric sclerae. Lungs: CTA Bilaterally. No Wheezing/Rhonchi/Rales. Heart:  Regular  rhythm,  No murmur, No Rubs, No Gallops  Abdomen: Soft, distended, Non tender.  +Bowel sounds, enlarge spleen   Extremities: No c/c/e  Psych:    anxious, no  agitated. Neurologic:  No acute neurological deficit.              Labs: Results:       Chemistry Recent Labs     09/12/19  0420 09/11/19  0400 09/10/19  1340   GLU 91 86 130*    143 144   K 4.1 4.5 4.6   * 113* 113*   CO2 20* 21 24   BUN 40* 36* 38*   CREA 1.18 1.02 1.01   CA 7.7* 7.5* 7.8*   AGAP 10 9 7   BUCR 34* 35* 38*   * 262* 306*   TP 6.6 6.4 6.4   ALB 3.4 2.9* 2.6*   GLOB 3.2 3.5 3.8   AGRAT 1.1 0.8 0.7*      CBC w/Diff Recent Labs     09/12/19  0420 09/11/19  0400 09/10/19  1340   WBC 17.7* 14.6* 15.6*   RBC 3.80* 3.83* 4.07*   HGB 8.5* 8.5* 9.2*   HCT 28.7* 29.1* 31.1*    122* 122*   GRANS 60 53 71   LYMPH 6* 10* 7*   EOS 11* 4 1      Cardiac Enzymes Recent Labs     09/10/19  1340   CPK 20*   CKND1 CALCULATION NOT PERFORMED WHEN RESULT IS BELOW LINEAR LIMIT      Coagulation Recent Labs     09/12/19  0420 09/11/19  0400   PTP 21.6* 18.8*   INR 1.9* 1.6*       Lipid Panel Lab Results   Component Value Date/Time    Cholesterol, total 116 11/29/2018 07:40 AM    HDL Cholesterol 35 (L) 11/29/2018 07:40 AM    LDL, calculated 67.2 11/29/2018 07:40 AM    VLDL, calculated 13.8 11/29/2018 07:40 AM    Triglyceride 69 11/29/2018 07:40 AM    CHOL/HDL Ratio 3.3 11/29/2018 07:40 AM      BNP No results for input(s): BNPP in the last 72 hours.    Liver Enzymes Recent Labs     09/12/19  0420   TP 6.6   ALB 3.4   *   SGOT 43*      Thyroid Studies No results found for: T4, T3U, TSH, TSHEXT, TSHEXT     Procedures/imaging: see electronic medical records for all procedures/Xrays and details which were not copied into this note but were reviewed prior to creation of Barby Roberto MD

## 2019-09-13 PROBLEM — Z51.5 HOSPICE CARE: Status: ACTIVE | Noted: 2019-01-01

## 2019-09-13 NOTE — DISCHARGE SUMMARY
Discharge Summary    Patient: Vidhi Barcenas MRN: [id number]  CSN: [id number]    YOB: 1940  Age: 70 y.o. Sex: female    DOA: 9/10/2019 LOS:  LOS: 3 days   Discharge Date: 9/13/2019     Primary Care Provider:  Guanaco Castro MD    Admission Diagnoses: Anemia [D64.9]    Discharge Diagnoses:    Hospital Problems  Date Reviewed: 8/30/2019          Codes Class Noted POA    Hospice care ICD-10-CM: Z51.5  ICD-9-CM: V66.7  9/13/2019 Unknown        Moderate to severe pulmonary hypertension ([facility] ICD-10-CM: I27.20  ICD-9-CM: 416.8  9/12/2019 Unknown        SOB (shortness of breath) ICD-10-CM: R06.02  ICD-9-CM: 786.05  9/11/2019 Unknown        Anemia ICD-10-CM: D64.9  ICD-9-CM: 285.9  9/10/2019 Unknown        * (Principal) Pneumonia ICD-10-CM: J18.9  ICD-9-CM: 472  9/10/2019 Unknown        S/P TIPS (transjugular intrahepatic portosystemic shunt) ICD-10-CM: Z77.912  ICD-9-CM: V45.89  8/29/2019 Yes        S/P AVR (aortic valve replacement) ICD-10-CM: Z95.2  ICD-9-CM: V43.3  7/24/2019 Yes        Myelofibrosis ([facility] ICD-10-CM: D75.81  ICD-9-CM: 289.83  3/27/2019 Yes        Splenomegaly ICD-10-CM: R16.1  ICD-9-CM: 789.2  10/10/2018 Yes        Ascites ICD-10-CM: R18.8  ICD-9-CM: 789.59  4/24/2018 Yes        Polycythemia vera ([facility] ICD-10-CM: D45  ICD-9-CM: 238.4  6/30/2016 Yes              Discharge Condition: stable     Discharge Medications:     Discharge Medication List as of 9/13/2019 10:29 AM      START taking these medications    Details   scopolamine (TRANSDERM-SCOP) 1 mg over 3 days pt3d 1 Patch by TransDERmal route every seventy-two (72) hours. , Normal, Disp-1 Patch, R-0, Hospice coverage not set      levoFLOXacin (LEVAQUIN) 750 mg tablet Take 1 Tab by mouth daily for 4 days. , Normal, Disp-4 Tab, R-0, Hospice coverage not set      clonazePAM (KLONOPIN) 0.5 mg tablet Take 0.5 Tabs by mouth every eight (8) hours as needed (anxiety).  Max Daily Amount: 0.75 mg., Print, Disp-5 Tab, R-0, Hospice coverage not set         CONTINUE these medications which have NOT CHANGED    Details   hydroxyurea (HYDREA) 500 mg capsule Take 500 mg by mouth daily. , Historical Med      furosemide (LASIX) 20 mg tablet Take  by mouth daily as needed., Historical Med      aspirin delayed-release 81 mg tablet Take 81 mg by mouth daily. , Historical Med             Procedures : paracentesis     Consults: hepatologist       PHYSICAL EXAM   Visit Vitals  /56   Pulse 93   Temp 97.7 °F (36.5 °C)   Resp 18   Ht 5' 5\" (1.651 m)   Wt 72.6 kg (160 lb)   SpO2 95%   BMI 26.63 kg/m²     General: Awake, cooperative, no acute distress    HEENT: NC, Atraumatic. PERRLA, EOMI. Anicteric sclerae. Lungs:  CTA Bilaterally. No Wheezing/Rhonchi/Rales. Heart:  Regular  rhythm,  No murmur, No Rubs, No Gallops  Abdomen: Soft, +distended, Non tender. +Bowel sounds,   Extremities: No c/c, mild edema   Psych:   Not anxious or agitated. Neurologic:  No acute neurological deficits. Admission HPI :   Juan Carlos Obrien is a 70 y.o. female who hx of cirrhosis, ploycythemia vera , avr is a direct admission from Dr. Miller Frausto office. She visited Dr. oJse Dent today and reported fatigue, cough, sob on exertion. She was so weak and unable to come to the lab done. Denies any slurred speech/headache/cp/n/v/blurred vission/d/c/palpitation/gait change/bleeding. Denies smoking/ any alcohol or drug use. Hospital Course :   Since she was admitted, iv abx were started for pna with breathing tx . cta negative for PE. Lasix was given for sob. paracentesis was performed to releasing breathing issue. She has severe pulmonary HTN with poor prognosis. Hospice was recommended, she would like to go home with hospice. Palliative care and hospice were consulted. Hospital bed was arranged. She will go home with home hospice.      Discharge planning discussed with patient and family, agree with the plan and no questions and concerns at this point. Activity: Activity as tolerated    Diet: dental soft     Follow-up: hospice physician     Disposition: home    Minutes spent on discharge: 45 min       Labs: Results:       Chemistry Recent Labs     09/13/19 0250 09/12/19  0420 09/11/19  0400   GLU 88 91 86    143 143   K 3.9 4.1 4.5    113* 113*   CO2 23 20* 21   BUN 38* 40* 36*   CREA 1.25 1.18 1.02   CA 7.7* 7.7* 7.5*   AGAP 10 10 9   BUCR 30* 34* 35*   * 216* 262*   TP 6.9 6.6 6.4   ALB 3.8 3.4 2.9*   GLOB 3.1 3.2 3.5   AGRAT 1.2 1.1 0.8      CBC w/Diff Recent Labs     09/13/19 0250 09/12/19 0420 09/11/19  0400   WBC 16.7* 17.7* 14.6*   RBC 3.72* 3.80* 3.83*   HGB 8.5* 8.5* 8.5*   HCT 28.1* 28.7* 29.1*   * 165 122*   GRANS 70 60 53   LYMPH 9* 6* 10*   EOS 2 11* 4      Cardiac Enzymes Recent Labs     09/10/19  1340   CPK 20*   CKND1 CALCULATION NOT PERFORMED WHEN RESULT IS BELOW LINEAR LIMIT      Coagulation Recent Labs     09/13/19 0250 09/12/19  0420   PTP 20.7* 21.6*   INR 1.8* 1.9*       Lipid Panel Lab Results   Component Value Date/Time    Cholesterol, total 116 11/29/2018 07:40 AM    HDL Cholesterol 35 (L) 11/29/2018 07:40 AM    LDL, calculated 67.2 11/29/2018 07:40 AM    VLDL, calculated 13.8 11/29/2018 07:40 AM    Triglyceride 69 11/29/2018 07:40 AM    CHOL/HDL Ratio 3.3 11/29/2018 07:40 AM      BNP No results for input(s): BNPP in the last 72 hours. Liver Enzymes Recent Labs     09/13/19  0250   TP 6.9   ALB 3.8   *   SGOT 40*      Thyroid Studies No results found for: T4, T3U, TSH, TSHEXT, TSHEXT         Significant Diagnostic Studies: Cta Chest W Or W Wo Cont    Result Date: 9/12/2019  EXAM: CTA chest INDICATION: Worsening shortness of breath. . COMPARISON: None TECHNIQUE: Axial CT imaging from the thoracic inlet through the diaphragm with intravenous contrast. Coronal and sagittal MIP reformats were generated.  One or more dose reduction techniques were used on this CT: automated exposure control, adjustment of the mAs and/or kVp according to patient size, and iterative reconstruction techniques. The specific techniques used on this CT exam have been documented in the patient's electronic medical record. Digital Imaging and Communications in the Medicine (DICOM) format image data are available to nonaffiliated external healthcare facilities or entities on a secure, media free, reciprocally searchable basis with patient authorization for at least a 12 month period after this study. _______________ FINDINGS: EXAM QUALITY: Adequate PULMONARY ARTERIES: No evidence of pulmonary embolism. MEDIASTINUM: Cardiomegaly. No pericardial effusion. Aortic valve prosthetic. Scattered atherosclerotic vascular calcifications including coronary arterial calcifications. LUNGS - PLEURA: Trace right pleural thickening. Very small left pleural effusion. There is prominent interlobular septal thickening within the apices and at the lung bases with moderate patchy groundglass and some regions of slightly denser alveolar opacity with a dependent gradient, findings strongly favor pulmonary edema, a concomitant pneumonia cannot be excluded but is overall doubted based on the appearance. AIRWAY: Patent major airways. LYMPH NODES: No adenopathy. UPPER ABDOMEN: Nothing acute. Hepatosplenomegaly and ascites with TIPS in place. OTHER: Partially imaged small right mammary implant with peripheral calcifications. No acute or aggressive osseous abnormalities identified. _______________     IMPRESSION: 1. No evidence of pulmonary embolism. 2.  Trace right pleural thickening. Very small left pleural effusion. There is prominent interlobular septal thickening within the apices and at the lung bases with moderate patchy groundglass and some regions of slightly denser alveolar opacity with a dependent gradient, findings strongly favor pulmonary edema, a concomitant pneumonia cannot be excluded but is overall doubted based on the appearance.      Ct Abd Pelv W Wo Cont    Result Date: 9/11/2019  EXAM: CT of the abdomen and pelvis INDICATION: more distended abdomen since TIPS -Additional: None COMPARISON: 03/17/19 TECHNIQUE: Axial CT imaging of the abdomen and pelvis was performed before and after the uneventful administration of 100 cc of Isovue-300 intravenous contrast. Multiplanar reformats were generated. One or more dose reduction techniques were used on this CT: automated exposure control, adjustment of the mAs and/or kVp according to patient size, and iterative reconstruction techniques. The specific techniques used on this CT exam have been documented in the patient's electronic medical record. Digital Imaging and Communications in Medicine (DICOM) format image data are available to nonaffiliated external healthcare facilities or entities on a secure, media free, reciprocally searchable basis with patient authorization for at least a 12-month period after this study. _______________ FINDINGS: Lower Chest: Bilateral scattered atelectasis. Aortic valve stent is present. Right breast prosthesis is again noted. Liver, Biliary: The TIPS shunt is present which extends from the right portal vein to the right hepatic vein. There is internal enhancement evident compatible with patency. The portal vessels to the posterior right hepatic lobe appear unenhanced and are likely occluded. This finding appears related to the presence of the TIPS shunt. The left portal vein also appears unenhanced and apparently thrombosed. The main portal vein demonstrates a diameter of 2.2 cm. The splenic vein demonstrates a diameter of 2.0 cm. No biliary dilation. Gallbladder is contracted but otherwise unremarkable. Pancreas: Normal. Spleen: The spleen is enlarged. Diameter measurements are 25.8 x 20.6 x 8.6 cm. Adrenals: Normal. Kidneys: Bilateral renal cysts are present. No hydronephrosis or suspicious renal masses are present Lymph Nodes: No enlarged lymph nodes.  Gastrointestinal Tract: No bowel dilation or wall thickening. Colonic diverticulosis is present. The pelvic floor appears low lying with at least a rectocele present. Pelvic Organs: Unremarkable. Vasculature: Atherosclerosis is present. GE junction varices appear present Bones: No acute or aggressive osseous abnormalities identified. Diffuse chronic spondylosis is present. Diffuse osteosclerosis is again evident Other: Ascites is present throughout the abdomen and pelvis _______________     IMPRESSION: The TIPS shunt is present and appears patent. There is diminished enhancement of the left portal vein and posterior right portal veins which is likely related to occlusion of these vessels Ascites Splenomegaly Colonic diverticulosis, probable rectocele GE junction varices appear present Diffuse chronic spondylosis and diffuse osteosclerosis are again seen    Us Guide Paracentesis    Result Date: 9/12/2019  PROCEDURE:  ULTRASOUND GUIDED PARACENTESIS INDICATION: Symptomatic ascites :  Lilian Peter PA-C ATTENDING PHYSICIAN:  Bianka Lopez MD ________________________ TECHNIQUE/FINDINGS: Informed consent was obtained prior to the procedure. Standard pre-procedure time out:  1358 hours. Preliminary ultrasound of the abdomen shows a moderate amount of ascites, ultrasound image saved in PACS. A right lower quadrant approach was chosen. Sterile probe cover and gel was used. The skin was marked, prepped and draped in sterile fashion and 1% lidocaine was used for local anesthesia. A 5 [place] yueh sheathed needle was advanced into the peritoneal cavity, was connected to a vacuum, and a total of 3.2 liters of straw colored fluid was removed. A specimen was collected and taken to the lab, as requested. The patient tolerated the procedure well and there were no immediate complications. GUIDANCE: Ultrasound guidance was used to position (and confirm the position of) the Yueh sheathed needle.   Image(s) saved in PACS: Ultrasound ________________________     IMPRESSION: Successful ultrasound guided paracentesis of approximately 3200 mL of straw-colored fluid. Us Guide Paracentesis    Result Date: 8/29/2019  PROCEDURE: ULTRASOUND GUIDED PARACENTESIS INDICATION: Recurrent ascites TECHNIQUE AND FINDINGS: Informed consent was obtained from the patient prior to the procedure. Standard pre-procedure timeout at 1023 hours. Preliminary ultrasound of the abdomen shows a large amount of ascites, ultrasound image saved in PACS. A left lower quadrant approach was chosen. The skin was prepped and draped in usual sterile fashion. Sterile ultrasound gel and a sterile US probe cover. Skin and underlying soft tissue was anesthetized with 1% lidocaine. A 5 [place] Yueh sheathed needle was advanced into the peritoneal cavity, under direct US visualization, was connected to a vacuum, and a total of 3.91 liters of serosanguineous fluid was removed. A specimen was collected and taken to the lab, as requested. The patient tolerated the procedure well, without complications. Standard post procedure pause. GUIDANCE: Ultrasound guidance was used to position (and confirm the position of) the Yueh sheathed needle. Image(s) saved in PACS: Ultrasound _______________     IMPRESSION: SUCCESSFUL ULTRASOUND-GUIDED PARACENTESIS WITH REMOVAL OF 3.91 LITERS OF FLUID. OF NOTE, THE PATIENT UNDERWENT 10 L PARACENTESIS JUST 2 DAYS AGO. THIS PROCEDURE WAS IMMEDIATELY FOLLOWED BY A TIPS PROCEDURE, REPORTED SEPARATELY. Us Guide Paracentesis    Result Date: 8/27/2019  PROCEDURE: ULTRASOUND GUIDED PARACENTESIS INDICATION: Recurrent ascites TECHNIQUE AND FINDINGS: Informed consent was obtained from the patient prior to the procedure. Standard pre-procedure timeout at 1504 hours. Preliminary ultrasound of the abdomen shows a large amount of ascites, ultrasound image saved in PACS. A right lower quadrant approach was chosen.   The skin was prepped and draped in usual sterile fashion. Sterile ultrasound gel and a sterile US probe cover. Skin and underlying soft tissue was anesthetized with 1% lidocaine. A 5 [place] Yueh sheathed needle was advanced into the peritoneal cavity, under direct US visualization, was connected to a vacuum, and a total of 10 liters of fluid was removed. 4 the first 8 L the fluid was clear yellow. It then became blood-tinged and was intermittently more bloody. Procedure stopped at 10 L by technologist. Follow-up ultrasound shows small volume residual ascites. A specimen was collected and taken to the lab, as requested. The patient tolerated the procedure well, without complications. Standard post procedure pause. GUIDANCE: Ultrasound guidance was used to position (and confirm the position of) the Yueh sheathed needle. Image(s) saved in PACS: Ultrasound _______________     IMPRESSION: SUCCESSFUL ULTRASOUND-GUIDED PARACENTESIS WITH REMOVAL OF 10 LITERS OF FLUID. THE FIRST 8 L WAS CLEAR YELLOW. THE LAST 2 L WERE INTERMITTENTLY BLOODY AND SOMETIMES HEAVIER BLEEDING. THIS LIKELY IS DUE TO THE YUEH CATHETER SUCKING UP AGAINST A BOWEL WALL VEIN. PATIENT WILL BE OBSERVED FOR AN EXTRA HOUR. SHE WILL RETURN IN 2 DAYS FOR TIPS PROCEDURE. WALL PERFORM PRE-TIPS ULTRASOUND TO SEE IF THERE IS ENOUGH FLUID FOR ADDITIONAL PARACENTESIS. Xr Chest Port    Result Date: 9/10/2019  EXAM:  PORTABLE CHEST INDICATION:  Cough. TECHNIQUE:  Portable, erect AP view. COMPARISON:  08/29/2019 ____________________ FINDINGS:  SUPPORT DEVICES: None. Interval removal of right neck. HEART AND MEDIASTINUM: Cardiac silhouette is within normal limits for technique and hypoinflation. Suggestion of of aortic valve replacement. Tortuous thoracic aorta. LUNGS AND PLEURAL SPACES: Lungs are hypoinflated with no significant change in prominence of the central pulmonary vasculature. Small left basilar consolidation. No pneumothorax. No significant pleural effusion.   BONY THORAX AND SOFT TISSUES: No acute osseous abnormality. Stable elevation of right hemidiaphragm. Surgical clips in the right axilla. Prior sternotomy. ____________________     IMPRESSION:  Small left basilar consolidation, atelectasis or pneumonia. Persistent prominence of the central pulmonary vasculature, not significantly changed. Xr Chest Port    Result Date: 8/30/2019  EXAM: CHEST RADIOGRAPH, SINGLE VIEW CLINICAL INDICATION/HISTORY: s/p TIPS; elevated wbcs      <Additional:  None. COMPARISON: 316 TECHNIQUE: Portable frontal view of the chest was obtained. _______________ FINDINGS: SUPPORT DEVICES: Right jugular sheath is present with its tip projecting at the SVC. HEART AND MEDIASTINUM: Status post median sternotomy. TAVR in place. Cardiac silhouette is within normal range in size. Aorta is slightly tortuous with mild calcified plaque at the arch. LUNGS AND PLEURAL SPACES: Pulmonary vascular redistribution has developed. Elevation of the right hemidiaphragm is unchanged with mild adjacent linear streaking, also unchanged, likely chronic atelectasis related to the elevated right hemidiaphragm. No alveolar consolidation. Skinfold is present projecting at the lateral right lung base. Focal linear scarring is present along the minor fissure. Costophrenic angles are sharp. BONY THORAX AND SOFT TISSUES: Surgical clips are present in the right axilla. TIPS in situ. _______________     IMPRESSION: CHF. Chronic elevation of the right hemidiaphragm with mild chronic right basilar atelectasis. No focal alveolar consolidation. 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 TIPS requested.  STERILE TECHNIQUE: The procedure was performed following standard maximal barrier technique which includes, cap, mask, sterile gown, sterile gloves, sterile full body drape, hand hygiene with alcohol foam, and 2% chlorhexidine for cutaneous antisepsis. Sterile ultrasound gel and a sterile US probe cover. TECHNIQUE AND FINDINGS:  A pre-TIPS paracentesis was performed with drainage of 3.91 liters of fluid and will be reported separately. A 10 L paracentesis was performed just 2 days ago. Ultrasound evaluation of potential access sites was performed. Sterile US gel and a sterile US probe cover were used. After successfully identifying a patent right internal jugular vein a permanent recording was created for the patient record. The right internal jugular vein was punctured under direct ultrasound guidance and a guide wire was passed to the right atrium followed by placement of a 10 [place] introducer sheath which was directed through the right atrium to the inferior vena cava. The right hepatic vein was selectively catheterized and a pre-TIPS hepatic venogram showed a widely patent hepatic vein. An over-the-wire Jaye thrombectomy balloon was then used to perform an occlusion hepatic venogram. This shows filling of the hepatic vein branches but did not retrograde fill the portal vein. The 16 gauge [place] needle was then passed through the sheath and exited from the central portion of the hepatic vein into the liver parenchyma. A branch of the right portal vein was cannulated but initially the guidewire could not be passed down the main portal vein. A 5 [place] catheter was advanced over the wire and into the portal vein. Contrast injection confirms position within a branch of the right portal vein. A stiff shaft Glidewire was eventually directed around a loop and down the portal vein and out into the superior mesenteric vein and then the splenic vein. A 5 [place] catheter was then passed over the wire and a pre-TIPS splenoportogram was performed.   This shows a massively dilated splenic vein, superior mesenteric and portal vein. No significant varices visualized. The introducer sheath was then passed over the catheter and into the main portal vein and pre-TIPS pressure measurements were obtained. The portal vein pressure was 27 mmHg and the right atrial pressure mean of 15 mmHg for a portal vein-right atrial gradient of 12 mmHg. The tract between the hepatic vein and the portal vein was dilated with an 8 mm diameter balloon. This was followed by placement of a [place] Viatorr constrained stentgraft measuring 8 cm in its covered portion and 2 cm in the uncovered portion. The uncovered portion was placed in the portal vein, just above the bifurcation, and the covered portion in the parenchymal tract extending back into the hepatic vein, flush with the inferior vena cava. The stent graft was post dilated to 8 mm in diameter. Follow-up splenoportogram shows a widely patent TIPS with preserved flow in the intrahepatic portal vein branches. Repeat pressure measurements in the portal vein and right atrium reveal a gradient of 10 mmHg. The stentgraft was then dilated to 10 mm diameter. Repeat pressure measurements reveal a portal vein--right atrial gradient of 8 mm Hg. Repeat splenoportogram shows a widely patent TIPS with continued flow in the main portal vein and intrahepatic branches. The 10 [place] introducer sheath was then exchanged over a wire for a 10 [place] short introducer sheath, the tip in the superior vena cava. The patient tolerated the procedure well, without complications. The patient was extubated prior to leaving the angiography suite. FLUOROSCOPY DETAILS: Time:  30.3 minutes. Dose (reference air kerma):  1450 mGy. MEDICATIONS:  During the procedure general endotracheal anesthesia was provided by the anesthesiology service.  _______________    IMPRESSION: SUCCESSFUL PLACEMENT OF A TIPS EXTENDING FROM THE RIGHT HEPATIC VEIN INTO THE PORTAL VEIN WITH REDUCTION OF THE PRE-TIPS GRADIENT OF 12 MMHG DOWN TO 8 MMHG, A GOOD HEMODYNAMIC RESPONSE. THE FOLLOW-UP TIPS VENOGRAM SHOWS A WELL-POSITIONED TIPS WITH PRESERVED FLOW IN THE INTRAHEPATIC PORTAL VEIN BRANCHES. THE SPLENIC VEIN, SUPERIOR MESENTERIC VEIN, AND PORTAL VEIN ARE ALL MASSIVELY DILATED. RECOMMEND OBTAINING A DUPLEX OF THE TIPS IN ABOUT 5 DAYS TO CONFIRM PATENCY AND ESTABLISH A BASELINE. Imaging Outpatient Consult/referral    Addendum Date: 8/21/2019    Addendum: MELD Score of 8, based on my recent labs    Result Date: 8/21/2019  OUTPATIENT CONSULTATION FOR TRANSJUGULAR INTRAHEPATIC PORTOSYSTEMIC SHUNT (TIPS) Requesting physician: Chhaya [facility] & Ch[facility] Chief Complaint: Intractable ascites History of Present Illness: 73-year-old  female with history of polycythemia vera/myelofibrosis. Since March 2019 she has been coming in for monthly large volume paracentesis, ranging from 8.7 up to 15.6 L. She has previously been treated with diuretics without improvement. No history of encephalopathy or bleeding esophageal or gastric varices. CT scan from 3/17/2019 showed marked hepatosplenomegaly with a patent and very dilated splenic vein and portal vein. On 3/22/2019 she underwent transjugular liver biopsy and hepatic vein pressure measurements revealing a portosystemic gradient of 12 mmHg. 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 In June of this year she underwent TAVR for severe aortic stenosis. On 8/10/2019 she was admitted with lower GI bleeding found due to external hemorrhoids. An upper endoscopy from 8/11/2019 showed 2 small distal esophageal varices and portal hypertensive gastropathy. She also complains of an umbilical hernia which is particularly bothersome prior to paracentesis. I discussed the TIPS procedure with the patient and her son at length and answered all of their questions.  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 A TIPS procedure would be expected to adequately decompress her portal vein for management of the ascites. I informed her that a quick Google search revealed only one case report using TIPS in this situation, with a successful outcome. She and her son are interested in pursuing this procedure and she is scheduled for 8/29/2019. Will bring her in a few days earlier for outpatient paracentesis. 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 I suspect a component of her portal hypertension may be due to increased inflow from the massive splenomegaly. She is scheduled for TIPS in 1 week. [facility]     CC:  Nicki Batista MD

## 2019-09-13 NOTE — HOSPICE
DME equipment in the home. Thank you for the referral to [facility] \[facility]\""[facility]. Please contact [phone] with any questions or concerns.

## 2019-09-13 NOTE — ROUTINE PROCESS
Bedside and Verbal shift change report given to BRITTNEY Farrell RN (oncoming nurse) by Ciro Hudson RN   (offgoing nurse). Report included the following information SBAR, Kardex, Intake/Output, MAR and Recent Results.

## 2019-09-13 NOTE — PROGRESS NOTES
conducted a Follow up consultation and Spiritual Assessment for Alexis Holder, who is a 70 y.o.,female. The  provided the following Interventions:  Continued the relationship of care and support. Listened empathically. Sacrament of the Sick provided by Fr. Carine Jarvis for patient per her request.   Chart reviewed. The following outcomes were achieved:  Patient expressed gratitude for pastoral care visit. Assessment:  There are no further spiritual or Yarsani issues which require Spiritual Care Services interventions at this time. Plan:  Chaplains will continue to follow and will provide pastoral care on an as needed/requested basis.  recommends bedside caregivers page  on duty if patient shows signs of acute spiritual or emotional distress. Rev.  [address]  [phone]

## 2019-09-13 NOTE — PROGRESS NOTES
Lasix given for SOB, occasional crackles on lung bases. On 2 li O2. Denied pain. Female external cath in place    0630 Pt complained of sleepless night due to frequent voiding after Lasix given, had a pure wick placed to help with output. Pt insisting of using bedside commode with 1 assist. Pt reported feeling stronger and less dyspneic after Lasix given. Had few very small soft BM for shift. Pt still getting Zosyn and Vancomycin.  Plan for D/C home today with Hospice

## 2019-09-13 NOTE — PROGRESS NOTES
0720-[facility] included SBAR MAR and Kardex. 1035-diwcharged to home, prescription provided and instructions reviewed with pt. Awaiting medical transport to home. 1135-discharged to home with medical transport.

## 2019-09-13 NOTE — CDMP QUERY
Pt admitted with pneumonia /Pt noted to have sob/ dyspnea. If possible, please document in progress notes and d/c summary if you are evaluating and /or treating any of the following:      Acute on Chronic Systolic CHF   Acute on Chronic Diastolic CHF   Acute on Chronic Systolic and Diastolic CHF   Acute Systolic CHF   Acute Diastolic CHF   Acute Systolic and Diastolic CHF   Chronic Systolic CHF   Chronic Diastolic CHF   Chronic Systolic and Diastolic CHF   Other, please specify   Clinically unable to determine    The medical record reflects the following:    Risk Factors: ascites    Clinical Indicators:  CXR documents Pulmonary edema, lower extremity edema, sob, Dyspnea.  Documented Chronic diastolic CHF in Referral  summary from [facility] on 9-    Treatment: Lasix IV 20mg     Thank-You   Rosario Georges RN [address] [place] [address] [phone]

## 2019-09-23 NOTE — CDMP QUERY
This patient with recent admission was admitted for pneumonia. This patient has  advanced age and chronic liver disease as a high-risk host factors. The patient  was treated with Zosyn and Vancomycin and discharged on Levaquin.  Based on  clinical indicators and treatment, can the suspected type of pneumonia be  further specified as:    · Gram negative pneumonia (excluding Haemophilus influenza)  · MRSA pneumonia  · Streptococcal pneumonia  · Other________________________  · Pneumonia cannot be further specified      Thank-You   Antonio Thomas RN [address] [place] [address] [phone]

## 2020-08-06 NOTE — PROGRESS NOTES
NUTRITION EDUCATION    Nutrition Education was provided for diagnosis of general healthy nutrition/diarrhea prevention nutrition     Discussed healthy diet and if she has a flare up of diarrhea what foods to eat and avoid. Provided contact information and handouts outlining these recommendations. NUTRITION DIAGNOSIS  Knowledge nutrition deficit  related to general healthy nutrition as evidenced by pt request for diet edu    GOALS  Pt able to choose foods that are lean and low in saturated fat     See education tab for further details.   Emmet Dakin, RD  PAGER:  [phone] Problem: Pressure Injury, Risk for  Goal: No new pressure injury (PI) development  Outcome: Outcome Met, Continue evaluating goal progress toward completion  Pt had no new PI development during this shift. Will continue to monitor.

## 2022-09-08 NOTE — TELEPHONE ENCOUNTER
----- Message from Rojas Elizabeth sent at 3/19/2019  8:58 AM EDT -----  Regarding: Dr. Sheri Gee pt's Case Manger, is calling for sooner hospital f/u for Asites within the  next few weeks. Pt's contact is [phone] or [phone]. 0

## 2023-05-29 NOTE — CDMP QUERY
[facility] Hospitalists Progress Note       Date Of Service: 05/29/23    Subjective:   No acute events. Mild pain in R hip. Only using Tylenol. Tolerating diet w/o issues.     Medical Decision Making:   Disposition:  Patient with multiple morbidities, continue with hospitalization.    Discussion and reviews:  Notes from last 24hrs reviewed and appreciated   Results of lab work and imaging studies from last 24hrs independently reviewed  Work up to continue as per orders and plan of care  Plan of care discussed with consultants as documented.    Daily updates and plan of care changes:  5/29:  -- seems like MRI won't be able to be completed b/c of uncertainty re: MRI compatibility of sacral stimulator  -- planned for cervical surgery tomorrow    5/28:  -- MRI C-spine pending  -- adjust insulin to basal/bolus regimen    5/27:  -- MRI C-spine pending  -- otherwise waiting on planned C-spine surgery on 5/30  -- discussed increasing activity and mobilizing as much as possible w/ assistance    5/26:  Medically stable  Per GI ok to proceed with MRI.   Plan for C1 laminectomy on 5/30  Cont to work with PT daily  Kidney function stable.      5/25:  Pt and family are still not decided regarding the recommended cervical procedure.   Neuro surg plan to d/w the procedure with family today  Hgb stable will cont to monitor  As per Neuro surg cont to hold Eliquis and ASA  Kidney function stable. Nephro following.   Cont to work with PT     5/24:  Patient MRI could not be completed due to hardware present.  CT cervical spine performed which showed reversal of the normal lordosis and multilevel degenerative changes.  According to neurosurgery patient may need laminectomy which they will be discussing with her later today.  Neurosurgery requested holding off on anticoagulation until surgery incisions been completed.  Continue to work with PT and OT.  Potassium level normalized we will continue to monitor.  Kidney function stable appreciate  Pt admitted with pneumoniaPt noted to have leukocytosis. If possible, please document in the progress notes and d/c summary if you are evaluating and / or treating any of the following:          .  Sepsis, present on admission, suspected or probable causative organism (please specify)   Sepsis, now resolved, suspected or probable causative organism (please specify)   Sepsis, not present on admission, suspected or probable causative organism (please specify)   No Sepsis, suspected or probable localized infection (please specify)   Sepsis was ruled out (include corresponding diagnosis for patients clinical picture and treatment)        . SIR's with infection    Other, please specify   Clinically unable to determine    The medical record reflects the following:     Risk Factors: pneumonia     Clinical Indicators: WBC 14.6, bands 13, pneumonia . VS WNL.       Treatment: zosyn, vancomycin     Joaquín Foss RN -[phone] nephrology input and recommendations.  Possible DEBORAH placement upon discharge     Assessment & Plan of care by problem:  78 y/o F w/ DM, HTN, CKD, frequent falls, PAD, s/p sacral nerve stimulator 5/2018 for fecal incontinence who p/w fall and R hip fracture.    # Traumatic R hip intertrochanteric fracture due to mechanical fall:  # s/p ORIF R hip 5/21/2023:  # Osteoporosis:  -- ortho, PM&R consults  -- PT/OT evaluations  -- XR R hip 5/20/23: R-sided intertrochanteric fracture  -- CT head 5/20/23: no acute intracranial hemorrhage or fracture  -- XR R knee 5/20/23: postsurgical changes observed, no hardware loosening of fracture  -- OR 5/21: ORIF R hip  -- resume bisphosphonate after discharge  -- pain control: scheduled Tylenol and gabapentin, PRN Percocet  -- current discharge recommendation per PM&R is for AIR    # Gait instability: Has had recurrent falls causing orthopedic fractures.  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  -- NSGY, PM&R consults  -- PT/OT evaluations  -- CT C-spine 5/23: multilevel degenerative changes w/ moderate canal stenosis C4-5, severe b/l neural foraminal narrowing C4-5 and C5-6  -- MRI C-spine pending (attempting to ensure that sacral nerve stimulator is MRI compatible)  -- planned for C1 laminectomy on 5/30  -- current discharge recommendation per PM&R is for AIR    # JAX on CKD stage 3b c/b hyperkalemia: Cr 2.08 on presentation. Improved.  # Hypovolemic hyponatremia: Improved w/ IVF.  -- nephrology consult  -- trend BMP  -- avoid nephrotoxins  -- treated medically for hyperK w/ IV Ca 5/22 & 5/23, IV insulin/dextrose 5/22, Lokelma 5/22 & 5/23    # Acute blood loss anemia due to surgery:  # Iron deficiency anemia:  -- iron studies c/w deficiency (ferritin 105, Fe level 27 is low, 10% Fe sat is low)  -- transfused 2 units PRBCs 5/22  -- will give IV iron sucrose x3 doses and then continue oral Fe supplement  -- recommend  outpatient GI evaluation and consideration of endoscopies given Fe-deficiency anemia    # Vitamin D deficiency:  -- 25-OH vitamin D level 26.5 is low  -- started oral vit D supplement    # HTN:  -- continue home amlodipine, hydrochlorothiazide  -- recommend discontinuing aspirin as there does not seem to be indication for chronic aspirin therapy    # IDDM:  -- HgbA1c 7.1%  -- current insulin: Lantus 5 units daily, Admelog 1 units TIDAC w/ add'l SSI    # Other:  -- continue home Myrbetriq     FEN: diabetic diet, no IVF  DVT PPX: SC heparin, will change to low dose Eliquis as per ortho recommendations after surgeries complete  DISPO: ongoing inpatient care    Recent vitals:  Visit Vitals  BP (!) 150/65 (BP Location: RUE - Right upper extremity, Patient Position: Semi-Carmona's)   Pulse 88   Temp 98.8 °F (37.1 °C) (Oral)   Resp 16   Ht 5' 7\" (1.702 m)   Wt 67.1 kg (148 lb)   SpO2 95%   BMI 23.18 kg/m²       Labs   Recent Labs   Lab 05/28/23  0436 05/26/23  0440 05/25/23  0426 05/24/23  0812   SODIUM 137 138 139 136   POTASSIUM 5.1 4.9 4.4 4.5   CHLORIDE 103 109 109 108   CO2 26 24 24 24   BUN 42* 45* 46* 49*   CREATININE 1.42* 1.32* 1.52* 1.60*   GLUCOSE 71 73 112* 109*   CALCIUM 8.6 8.2* 8.2* 8.0*   MG  --  3.1* 2.9* 2.7*   PHOS  --   --  2.8  --      Recent Labs   Lab 05/28/23  0436 05/26/23  0440 05/25/23  1038   WBC 10.9 10.1 11.2*   HGB 8.9* 8.0* 8.7*   HCT 27.6* 25.3* 27.4*    321 323     No results found     Recent Labs   Lab 05/28/23  1115 05/28/23  1758 05/28/23 2029 05/29/23  0003 05/29/23  0602   GLUCOSE BEDSIDE 116* 86 147* 131* 98        Cultures  Microbiology Results     None           Objective:   Temp:  [98 °F (36.7 °C)-99 °F (37.2 °C)] 98.8 °F (37.1 °C)  Heart Rate:  [83-88] 88  Resp:  [16-17] 16  BP: (126-150)/(61-65) 150/65   SpO2 Readings from Last 3 Encounters:   05/28/23 95%   01/08/23 99%   12/08/22 97%      Physical Exam  Constitutional:       General: She is not in acute distress.      Appearance: Normal appearance.   HENT:      Head: Normocephalic and atraumatic.      Mouth/Throat:      Mouth: Mucous membranes are moist.      Pharynx: Oropharynx is clear.      Neck: Neck supple.   Cardiovascular:      Rate and Rhythm: Normal rate and regular rhythm.      Pulses: Normal pulses.      Heart sounds: Normal heart sounds.   Pulmonary:      Effort: Pulmonary effort is normal. No respiratory distress.      Breath sounds: Normal breath sounds. No wheezing.   Abdominal:      General: Bowel sounds are normal. There is no distension.      Palpations: Abdomen is soft.      Tenderness: There is no abdominal tenderness.   Musculoskeletal:      Comments: R hip incisions covered w/ dry bandages not removed by myself.   Skin:     General: Skin is warm and dry.   Neurological:      Mental Status: She is alert.   Psychiatric:         Mood and Affect: Mood normal.         Behavior: Behavior normal.       Current Medications:  Current Facility-Administered Medications   Medication Dose Route Frequency Provider Last Rate Last Admin   • insulin lispro (ADMELOG, HumaLOG) injection 2 Units  2 Units Subcutaneous TID WC Cade Martin MD   2 Units at 05/28/23 1200   • insulin glargine (LANTUS) injection 6 Units  6 Units Subcutaneous Daily Cade Martin MD   6 Units at 05/28/23 0912   • lisinopril (ZESTRIL) tablet 5 mg  5 mg Oral Daily Cade Martin MD       • iron sucrose (VENOFER) injection 200 mg  200 mg Intravenous Daily Cade Martin MD   200 mg at 05/28/23 1702   • docusate sodium-sennosides (SENOKOT S) 50-8.6 MG 2 tablet  2 tablet Oral Nightly Cade Martin MD   2 tablet at 05/28/23 2153   • cholecalciferol (VITAMIN D) tablet 25 mcg  25 mcg Oral Daily Cade Martin MD   25 mcg at 05/28/23 0913   • heparin (porcine) injection 5,000 Units  5,000 Units Subcutaneous 3 times per day Franc Estrella MD   5,000 Units at 05/29/23 0645   • [Held by provider] apixaBAN (ELIQUIS) tablet 2.5 mg  2.5 mg Oral 2 times per day Franc Estrella MD    2.5 mg at 05/23/23 2150   • sodium chloride (PF) 0.9 % injection 2 mL  2 mL Intracatheter 2 times per day Teofilo Trevizo MD   2 mL at 05/28/23 2154   • amLODIPine (NORVASC) tablet 10 mg  10 mg Oral Daily Teofilo Trevizo MD   10 mg at 05/28/23 0913   • hydroCHLOROthiazide (HYDRODIURIL) tablet 25 mg  25 mg Oral Daily Teofilo Trevizo MD   25 mg at 05/28/23 0913   • mirabegron ER (MYRBETRIQ) 24 hour tablet 50 mg  50 mg Oral Daily Teofilo Trevizo MD   50 mg at 05/28/23 0913   • insulin lispro (ADMELOG,HumaLOG) - Correction Dose   Subcutaneous 4 times per day Radha Edwards MD   4 Units at 05/27/23 1751   • gabapentin (NEURONTIN) capsule 100 mg  100 mg Oral QHS Teofilo Trevizo MD   100 mg at 05/28/23 2153   • acetaminophen (TYLENOL) tablet 1,000 mg  1,000 mg Oral 2 times per day Teofilo Trevizo MD   1,000 mg at 05/28/23 0913       Continuous Infusions:  Current Facility-Administered Medications   Medication Dose Route Frequency Provider Last Rate Last Admin       PRN Meds:.  Current Facility-Administered Medications   Medication Dose Route Frequency   • ondansetron (ZOFRAN) injection 4 mg  4 mg Intravenous Q6H PRN   • polyethylene glycol (MIRALAX) packet 17 g  17 g Oral Daily PRN   • cyclobenzaprine (FLEXERIL) tablet 5 mg  5 mg Oral BID PRN   • oxyCODONE-acetaminophen (PERCOCET) 2.5-325 MG tablet 1 tablet  1 tablet Oral Q4H PRN   • sodium chloride 0.9 % flush bag 25 mL  25 mL Intravenous PRN   • dextrose 50 % injection 25 g  25 g Intravenous PRN   • dextrose 50 % injection 12.5 g  12.5 g Intravenous PRN   • glucagon (GLUCAGEN) injection 1 mg  1 mg Intramuscular PRN   • dextrose (GLUTOSE) 40 % gel 15 g  15 g Oral PRN   • dextrose (GLUTOSE) 40 % gel 30 g  30 g Oral PRN   • acetaminophen (TYLENOL) tablet 650 mg  650 mg Oral Q6H PRN   • bisacodyl (DULCOLAX) suppository 10 mg  10 mg Rectal Daily PRN   • magnesium hydroxide (MILK OF MAGNESIA) 400 MG/5ML suspension 30 mL  30 mL Oral Daily PRN            Code Status    Code  Status: Full Resuscitation      I spent greater than 45 minutes coordinating care, reviewing patient's diagnostic studies, examining patient, reviewing pertinent notes, collaborating with RNs/physicians/APNs/PAs involved in patient's care, determining management plan, and discussing plan with patient at bedside.       This note was created using voice recognition technology. It may include inadvertent transcriptional errors. Any such errors should be contextually interpreted and should not be taken to alter the content or the meaning.   Note to Patient: The 21st Century Cures Act makes medical notes like these available to patients in the interest of transparency. However, be advised this is a medical document. It is intended as peer to peer communication. It is written in medical language and may contain abbreviations or verbiage that are unfamiliar. It may appear blunt or direct. Medical documents are intended to carry relevant information, facts as evident, and the clinical opinion of the practitioner.

## (undated) DEVICE — SENSOR PLSE OXMTR AD CBL L36IN ADH FRM FIT SPO2 DISP

## (undated) DEVICE — SWAN-GANZ POLYMER BLEND TRUE SIZE C-TIP CONTROLCATH TD CATHETER: Brand: SWAN-GANZ CONTROLCATH TRUE SIZE

## (undated) DEVICE — MOUTHPIECE ENDOSCP 20X27MM --

## (undated) DEVICE — PERCLOSE PROGLIDE™ SUTURE-MEDIATED CLOSURE SYSTEM: Brand: PERCLOSE PROGLIDE™

## (undated) DEVICE — 4-PORT MANIFOLD: Brand: NEPTUNE 2

## (undated) DEVICE — AIRLIFE™ NASAL OXYGEN CANNULA CURVED, FLARED TIP WITH 14 FOOT (4.3 M) CRUSH-RESISTANT TUBING, OVER-THE-EAR STYLE: Brand: AIRLIFE™

## (undated) DEVICE — CONTAINER,SPECIMEN,OR STERILE,4OZ: Brand: MEDLINE

## (undated) DEVICE — EJECTOR SALIVA 6 IN FLX CLR

## (undated) DEVICE — BRUSH CYTO L240CM DIA2.3MM GI COLONOSCOPY 3 RNG HNDL RADPQ

## (undated) DEVICE — ALL PURPOSE SPONGES,NON-WOVEN, 4 PLY: Brand: CURITY

## (undated) DEVICE — 2000CC GUARDIAN II: Brand: GUARDIAN

## (undated) DEVICE — Device

## (undated) DEVICE — MAJ-1414 SINGLE USE ADPATER BIOPSY VALV: Brand: SINGLE USE ADAPTOR BIOPSY VALVE

## (undated) DEVICE — CATH DIAG FR4 EXPO 5FRX100CM -- EXPO

## (undated) DEVICE — BANDAGE ADH W3INXL5YD BGE E STRTCH FLUFFY NONFRAY EDG H2O

## (undated) DEVICE — TUBING PRSS MON L24IN PVC RIG NONEXPANDING M TO FEM CONN

## (undated) DEVICE — Z DISCONTINUED NO SUB IDED CATHETER ANGIO 5-6FR L110CM GWIRE 0.038IN 145DEG PGTL 5-8

## (undated) DEVICE — 3M™ TEGADERM™ TRANSPARENT FILM DRESSING FRAME STYLE, 1626W, 4 IN X 4-3/4 IN (10 CM X 12 CM), 50/CT 4CT/CASE: Brand: 3M™ TEGADERM™

## (undated) DEVICE — ENDO CARRY-ON PROCEDURE KIT INCLUDES ENZYMATIC SPONGE, GAUZE, BIOHAZARD LABEL, TRAY, LUBRICANT, DIRTY SCOPE LABEL, WATER LABEL, TRAY, DRAWSTRING PAD, AND DEFENDO 4-PIECE KIT.: Brand: ENDO CARRY-ON PROCEDURE KIT

## (undated) DEVICE — REM POLYHESIVE ADULT PATIENT RETURN ELECTRODE: Brand: VALLEYLAB

## (undated) DEVICE — SPONGE GZ W4XL4IN COT 12 PLY TYP VII WVN C FLD DSGN

## (undated) DEVICE — SYR 3ML LL TIP 1/10ML GRAD --

## (undated) DEVICE — DEVON™ KNEE AND BODY STRAP 60" X 3" (1.5 M X 7.6 CM): Brand: DEVON

## (undated) DEVICE — PINNACLE PRECISION ACCESS SYSTEM INTRODUCER SHEATH: Brand: PINNACLE PRECISION ACCESS SYSTEM

## (undated) DEVICE — TRNQT TEXT 1X18IN BLU LF DISP -- CONVERT TO ITEM 362165

## (undated) DEVICE — RADIFOCUS OBTURATOR: Brand: RADIFOCUS

## (undated) DEVICE — SINGLE PORT MANIFOLD: Brand: NEPTUNE 2

## (undated) DEVICE — TORCON NB, ADVANTAGE CATHETER: Brand: TORCON NB

## (undated) DEVICE — GLIDESHEATH SLENDER STAINLESS STEEL KIT: Brand: GLIDESHEATH SLENDER

## (undated) DEVICE — TRAY PREP DRY W/ PREM GLV 2 APPL 6 SPNG 2 UNDPD 1 OVERWRAP

## (undated) DEVICE — DRAPE,ANGIO,BRACH,STERILE,38X44: Brand: MEDLINE

## (undated) DEVICE — SYR 50ML SLIP TIP NSAF LF STRL --

## (undated) DEVICE — KENDALL RADIOLUCENT FOAM MONITORING ELECTRODE RECTANGULAR SHAPE: Brand: KENDALL

## (undated) DEVICE — PRESSURE MONITORING SET: Brand: TRUWAVE

## (undated) DEVICE — GUIDEWIRE VASC L260CM DIA0.035IN RAD 3MM J TIP L7CM PTFE

## (undated) DEVICE — PACK PROCEDURE SURG CATH CUST

## (undated) DEVICE — PROCEDURE KIT FLUID MGMT 10 FR CUST MAINFOLD

## (undated) DEVICE — TRAP SPEC COLL POLYP POLYSTYR --

## (undated) DEVICE — CATH IV SAFE STR 22GX1IN BLU -- PROTECTIV PLUS

## (undated) DEVICE — SET ADMIN 16ML TBNG L100IN 2 Y INJ SITE IV PIGGY BK DISP

## (undated) DEVICE — STOPCOCK TRNSDUC 500PSI 3 W ROT M LUER LT BLU OFF HNDL R

## (undated) DEVICE — NDL PRT INJ NSAF BLNT 18GX1.5 --

## (undated) DEVICE — SOL IRRIGATION INJ NACL 0.9% 500ML BTL

## (undated) DEVICE — FORCEPS BX CAP 240CM L RAD JAW 4

## (undated) DEVICE — SHIELD RAD 14X16 IN W/ SCOOP ABSORBER